# Patient Record
Sex: FEMALE | Race: BLACK OR AFRICAN AMERICAN | NOT HISPANIC OR LATINO | Employment: FULL TIME | URBAN - METROPOLITAN AREA
[De-identification: names, ages, dates, MRNs, and addresses within clinical notes are randomized per-mention and may not be internally consistent; named-entity substitution may affect disease eponyms.]

---

## 2021-03-15 ENCOUNTER — AMBULATORY - HEALTHEAST (OUTPATIENT)
Dept: NURSING | Facility: CLINIC | Age: 31
End: 2021-03-15

## 2021-04-05 ENCOUNTER — AMBULATORY - HEALTHEAST (OUTPATIENT)
Dept: NURSING | Facility: CLINIC | Age: 31
End: 2021-04-05

## 2021-05-13 ENCOUNTER — APPOINTMENT (OUTPATIENT)
Dept: CT IMAGING | Facility: CLINIC | Age: 31
DRG: 394 | End: 2021-05-13
Attending: INTERNAL MEDICINE
Payer: COMMERCIAL

## 2021-05-13 ENCOUNTER — HOSPITAL ENCOUNTER (INPATIENT)
Facility: CLINIC | Age: 31
LOS: 1 days | Discharge: HOME | DRG: 394 | End: 2021-05-14
Attending: EMERGENCY MEDICINE | Admitting: INTERNAL MEDICINE
Payer: COMMERCIAL

## 2021-05-13 DIAGNOSIS — R11.2 NON-INTRACTABLE VOMITING WITH NAUSEA, UNSPECIFIED VOMITING TYPE: ICD-10-CM

## 2021-05-13 DIAGNOSIS — E87.1 HYPONATREMIA: ICD-10-CM

## 2021-05-13 DIAGNOSIS — N17.9 ACUTE KIDNEY INJURY (H): ICD-10-CM

## 2021-05-13 DIAGNOSIS — R10.84 ABDOMINAL PAIN, GENERALIZED: ICD-10-CM

## 2021-05-13 LAB
ALBUMIN SERPL-MCNC: 4.8 G/DL (ref 3.4–5)
ALBUMIN UR-MCNC: 30 MG/DL
ALP SERPL-CCNC: 115 U/L (ref 40–150)
ALT SERPL W P-5'-P-CCNC: 25 U/L (ref 0–50)
AMPHETAMINES UR QL SCN: NEGATIVE
ANION GAP SERPL CALCULATED.3IONS-SCNC: 14 MMOL/L (ref 3–14)
APPEARANCE UR: ABNORMAL
AST SERPL W P-5'-P-CCNC: 21 U/L (ref 0–45)
B-HCG FREE SERPL-ACNC: <5 IU/L
BACTERIA #/AREA URNS HPF: ABNORMAL /HPF
BARBITURATES UR QL: NEGATIVE
BASOPHILS # BLD AUTO: 0 10E9/L (ref 0–0.2)
BASOPHILS NFR BLD AUTO: 0.1 %
BENZODIAZ UR QL: NEGATIVE
BILIRUB SERPL-MCNC: 0.6 MG/DL (ref 0.2–1.3)
BILIRUB UR QL STRIP: NEGATIVE
BUN SERPL-MCNC: 64 MG/DL (ref 7–30)
CALCIUM SERPL-MCNC: 9.5 MG/DL (ref 8.5–10.1)
CANNABINOIDS UR QL SCN: POSITIVE
CHLORIDE SERPL-SCNC: 85 MMOL/L (ref 94–109)
CO2 SERPL-SCNC: 25 MMOL/L (ref 20–32)
COCAINE UR QL: NEGATIVE
COLOR UR AUTO: ABNORMAL
CORTIS SERPL-MCNC: 36.2 UG/DL (ref 4–22)
CREAT SERPL-MCNC: 3.48 MG/DL (ref 0.52–1.04)
DIFFERENTIAL METHOD BLD: ABNORMAL
EOSINOPHIL # BLD AUTO: 0 10E9/L (ref 0–0.7)
EOSINOPHIL NFR BLD AUTO: 0.1 %
ERYTHROCYTE [DISTWIDTH] IN BLOOD BY AUTOMATED COUNT: 11.5 % (ref 10–15)
ETHANOL SERPL-MCNC: <0.01 G/DL
GFR SERPL CREATININE-BSD FRML MDRD: 16 ML/MIN/{1.73_M2}
GLUCOSE BLDC GLUCOMTR-MCNC: 117 MG/DL (ref 70–99)
GLUCOSE BLDC GLUCOMTR-MCNC: 131 MG/DL (ref 70–99)
GLUCOSE SERPL-MCNC: 169 MG/DL (ref 70–99)
GLUCOSE UR STRIP-MCNC: NEGATIVE MG/DL
HBA1C MFR BLD: 6.2 % (ref 0–5.6)
HCT VFR BLD AUTO: 39.3 % (ref 35–47)
HGB BLD-MCNC: 13.7 G/DL (ref 11.7–15.7)
HGB UR QL STRIP: ABNORMAL
HYALINE CASTS #/AREA URNS LPF: 6 /LPF (ref 0–2)
IMM GRANULOCYTES # BLD: 0.1 10E9/L (ref 0–0.4)
IMM GRANULOCYTES NFR BLD: 0.4 %
KETONES UR STRIP-MCNC: NEGATIVE MG/DL
LABORATORY COMMENT REPORT: NORMAL
LEUKOCYTE ESTERASE UR QL STRIP: NEGATIVE
LIPASE SERPL-CCNC: 162 U/L (ref 73–393)
LYMPHOCYTES # BLD AUTO: 2.2 10E9/L (ref 0.8–5.3)
LYMPHOCYTES NFR BLD AUTO: 16.1 %
MAGNESIUM SERPL-MCNC: 3.1 MG/DL (ref 1.6–2.3)
MCH RBC QN AUTO: 28.4 PG (ref 26.5–33)
MCHC RBC AUTO-ENTMCNC: 34.9 G/DL (ref 31.5–36.5)
MCV RBC AUTO: 82 FL (ref 78–100)
MONOCYTES # BLD AUTO: 1.6 10E9/L (ref 0–1.3)
MONOCYTES NFR BLD AUTO: 12 %
MUCOUS THREADS #/AREA URNS LPF: PRESENT /LPF
NEUTROPHILS # BLD AUTO: 9.6 10E9/L (ref 1.6–8.3)
NEUTROPHILS NFR BLD AUTO: 71.3 %
NITRATE UR QL: NEGATIVE
NRBC # BLD AUTO: 0 10*3/UL
NRBC BLD AUTO-RTO: 0 /100
OPIATES UR QL SCN: NEGATIVE
PCP UR QL SCN: NEGATIVE
PH UR STRIP: 5 PH (ref 5–7)
PLATELET # BLD AUTO: 492 10E9/L (ref 150–450)
POTASSIUM SERPL-SCNC: 3.6 MMOL/L (ref 3.4–5.3)
PROT SERPL-MCNC: 9.9 G/DL (ref 6.8–8.8)
RBC # BLD AUTO: 4.82 10E12/L (ref 3.8–5.2)
RBC #/AREA URNS AUTO: <1 /HPF (ref 0–2)
SARS-COV-2 RNA RESP QL NAA+PROBE: NEGATIVE
SODIUM SERPL-SCNC: 124 MMOL/L (ref 133–144)
SOURCE: ABNORMAL
SP GR UR STRIP: 1.02 (ref 1–1.03)
SPECIMEN SOURCE: NORMAL
SQUAMOUS #/AREA URNS AUTO: 6 /HPF (ref 0–1)
TROPONIN I SERPL-MCNC: <0.015 UG/L (ref 0–0.04)
TSH SERPL DL<=0.005 MIU/L-ACNC: 0.93 MU/L (ref 0.4–4)
UROBILINOGEN UR STRIP-MCNC: NORMAL MG/DL (ref 0–2)
WBC # BLD AUTO: 13.4 10E9/L (ref 4–11)
WBC #/AREA URNS AUTO: 1 /HPF (ref 0–5)

## 2021-05-13 PROCEDURE — 96374 THER/PROPH/DIAG INJ IV PUSH: CPT

## 2021-05-13 PROCEDURE — 258N000003 HC RX IP 258 OP 636: Performed by: INTERNAL MEDICINE

## 2021-05-13 PROCEDURE — 84443 ASSAY THYROID STIM HORMONE: CPT | Performed by: EMERGENCY MEDICINE

## 2021-05-13 PROCEDURE — 99223 1ST HOSP IP/OBS HIGH 75: CPT | Mod: AI | Performed by: INTERNAL MEDICINE

## 2021-05-13 PROCEDURE — 250N000013 HC RX MED GY IP 250 OP 250 PS 637: Performed by: INTERNAL MEDICINE

## 2021-05-13 PROCEDURE — 81001 URINALYSIS AUTO W/SCOPE: CPT | Performed by: INTERNAL MEDICINE

## 2021-05-13 PROCEDURE — 83036 HEMOGLOBIN GLYCOSYLATED A1C: CPT | Performed by: EMERGENCY MEDICINE

## 2021-05-13 PROCEDURE — 93005 ELECTROCARDIOGRAM TRACING: CPT

## 2021-05-13 PROCEDURE — 84702 CHORIONIC GONADOTROPIN TEST: CPT

## 2021-05-13 PROCEDURE — 258N000003 HC RX IP 258 OP 636: Performed by: EMERGENCY MEDICINE

## 2021-05-13 PROCEDURE — 83690 ASSAY OF LIPASE: CPT | Performed by: EMERGENCY MEDICINE

## 2021-05-13 PROCEDURE — 120N000001 HC R&B MED SURG/OB

## 2021-05-13 PROCEDURE — 999N001017 HC STATISTIC GLUCOSE BY METER IP

## 2021-05-13 PROCEDURE — 82077 ASSAY SPEC XCP UR&BREATH IA: CPT | Performed by: EMERGENCY MEDICINE

## 2021-05-13 PROCEDURE — 96361 HYDRATE IV INFUSION ADD-ON: CPT

## 2021-05-13 PROCEDURE — 87635 SARS-COV-2 COVID-19 AMP PRB: CPT | Performed by: EMERGENCY MEDICINE

## 2021-05-13 PROCEDURE — C9803 HOPD COVID-19 SPEC COLLECT: HCPCS

## 2021-05-13 PROCEDURE — 82533 TOTAL CORTISOL: CPT | Performed by: EMERGENCY MEDICINE

## 2021-05-13 PROCEDURE — 83735 ASSAY OF MAGNESIUM: CPT | Performed by: EMERGENCY MEDICINE

## 2021-05-13 PROCEDURE — 74176 CT ABD & PELVIS W/O CONTRAST: CPT

## 2021-05-13 PROCEDURE — 80053 COMPREHEN METABOLIC PANEL: CPT | Performed by: EMERGENCY MEDICINE

## 2021-05-13 PROCEDURE — 85025 COMPLETE CBC W/AUTO DIFF WBC: CPT | Performed by: EMERGENCY MEDICINE

## 2021-05-13 PROCEDURE — 84484 ASSAY OF TROPONIN QUANT: CPT | Performed by: EMERGENCY MEDICINE

## 2021-05-13 PROCEDURE — 96375 TX/PRO/DX INJ NEW DRUG ADDON: CPT

## 2021-05-13 PROCEDURE — 99285 EMERGENCY DEPT VISIT HI MDM: CPT

## 2021-05-13 PROCEDURE — 80307 DRUG TEST PRSMV CHEM ANLYZR: CPT | Performed by: INTERNAL MEDICINE

## 2021-05-13 PROCEDURE — 250N000011 HC RX IP 250 OP 636: Performed by: EMERGENCY MEDICINE

## 2021-05-13 RX ORDER — DEXTROSE MONOHYDRATE 25 G/50ML
25-50 INJECTION, SOLUTION INTRAVENOUS
Status: DISCONTINUED | OUTPATIENT
Start: 2021-05-13 | End: 2021-05-14 | Stop reason: HOSPADM

## 2021-05-13 RX ORDER — NORTRIPTYLINE HCL 10 MG
50 CAPSULE ORAL AT BEDTIME
COMMUNITY

## 2021-05-13 RX ORDER — NITROGLYCERIN 0.4 MG/1
0.4 TABLET SUBLINGUAL EVERY 5 MIN PRN
Status: DISCONTINUED | OUTPATIENT
Start: 2021-05-13 | End: 2021-05-14 | Stop reason: HOSPADM

## 2021-05-13 RX ORDER — SODIUM CHLORIDE 9 MG/ML
INJECTION, SOLUTION INTRAVENOUS CONTINUOUS
Status: DISCONTINUED | OUTPATIENT
Start: 2021-05-13 | End: 2021-05-14 | Stop reason: HOSPADM

## 2021-05-13 RX ORDER — LORAZEPAM 2 MG/ML
0.5 INJECTION INTRAMUSCULAR EVERY 4 HOURS PRN
Status: DISCONTINUED | OUTPATIENT
Start: 2021-05-13 | End: 2021-05-14 | Stop reason: HOSPADM

## 2021-05-13 RX ORDER — NICOTINE POLACRILEX 4 MG
15-30 LOZENGE BUCCAL
Status: DISCONTINUED | OUTPATIENT
Start: 2021-05-13 | End: 2021-05-14 | Stop reason: HOSPADM

## 2021-05-13 RX ORDER — LIDOCAINE 40 MG/G
CREAM TOPICAL
Status: DISCONTINUED | OUTPATIENT
Start: 2021-05-13 | End: 2021-05-14 | Stop reason: HOSPADM

## 2021-05-13 RX ORDER — ONDANSETRON 2 MG/ML
4 INJECTION INTRAMUSCULAR; INTRAVENOUS EVERY 6 HOURS PRN
Status: DISCONTINUED | OUTPATIENT
Start: 2021-05-13 | End: 2021-05-14 | Stop reason: HOSPADM

## 2021-05-13 RX ORDER — ACETAMINOPHEN 650 MG/1
650 SUPPOSITORY RECTAL EVERY 4 HOURS PRN
Status: DISCONTINUED | OUTPATIENT
Start: 2021-05-13 | End: 2021-05-14 | Stop reason: HOSPADM

## 2021-05-13 RX ORDER — BISACODYL 10 MG
10 SUPPOSITORY, RECTAL RECTAL DAILY PRN
Status: DISCONTINUED | OUTPATIENT
Start: 2021-05-13 | End: 2021-05-14 | Stop reason: HOSPADM

## 2021-05-13 RX ORDER — KETOROLAC TROMETHAMINE 15 MG/ML
15 INJECTION, SOLUTION INTRAMUSCULAR; INTRAVENOUS ONCE
Status: COMPLETED | OUTPATIENT
Start: 2021-05-13 | End: 2021-05-13

## 2021-05-13 RX ORDER — INSULIN GLARGINE 100 [IU]/ML
15 INJECTION, SOLUTION SUBCUTANEOUS EVERY MORNING
COMMUNITY
End: 2021-06-11

## 2021-05-13 RX ORDER — ONDANSETRON 4 MG/1
4 TABLET, ORALLY DISINTEGRATING ORAL EVERY 6 HOURS PRN
Status: DISCONTINUED | OUTPATIENT
Start: 2021-05-13 | End: 2021-05-14 | Stop reason: HOSPADM

## 2021-05-13 RX ORDER — PROCHLORPERAZINE MALEATE 5 MG
10 TABLET ORAL EVERY 6 HOURS PRN
Status: DISCONTINUED | OUTPATIENT
Start: 2021-05-13 | End: 2021-05-14 | Stop reason: HOSPADM

## 2021-05-13 RX ORDER — DIPHENHYDRAMINE HYDROCHLORIDE 50 MG/ML
25 INJECTION INTRAMUSCULAR; INTRAVENOUS ONCE
Status: COMPLETED | OUTPATIENT
Start: 2021-05-13 | End: 2021-05-13

## 2021-05-13 RX ORDER — HALOPERIDOL 5 MG/ML
5 INJECTION INTRAMUSCULAR ONCE
Status: COMPLETED | OUTPATIENT
Start: 2021-05-13 | End: 2021-05-13

## 2021-05-13 RX ORDER — ACETAMINOPHEN 325 MG/1
650 TABLET ORAL EVERY 4 HOURS PRN
Status: DISCONTINUED | OUTPATIENT
Start: 2021-05-13 | End: 2021-05-14 | Stop reason: HOSPADM

## 2021-05-13 RX ORDER — MULTIPLE VITAMINS W/ MINERALS TAB 9MG-400MCG
1 TAB ORAL DAILY
COMMUNITY
End: 2021-06-11

## 2021-05-13 RX ORDER — PROCHLORPERAZINE 25 MG
25 SUPPOSITORY, RECTAL RECTAL EVERY 12 HOURS PRN
Status: DISCONTINUED | OUTPATIENT
Start: 2021-05-13 | End: 2021-05-14 | Stop reason: HOSPADM

## 2021-05-13 RX ORDER — HYDRALAZINE HYDROCHLORIDE 20 MG/ML
10 INJECTION INTRAMUSCULAR; INTRAVENOUS EVERY 4 HOURS PRN
Status: DISCONTINUED | OUTPATIENT
Start: 2021-05-13 | End: 2021-05-14 | Stop reason: HOSPADM

## 2021-05-13 RX ORDER — PANTOPRAZOLE SODIUM 40 MG/1
40 TABLET, DELAYED RELEASE ORAL
Status: DISCONTINUED | OUTPATIENT
Start: 2021-05-14 | End: 2021-05-14 | Stop reason: HOSPADM

## 2021-05-13 RX ORDER — ONDANSETRON 2 MG/ML
8 INJECTION INTRAMUSCULAR; INTRAVENOUS ONCE
Status: COMPLETED | OUTPATIENT
Start: 2021-05-13 | End: 2021-05-13

## 2021-05-13 RX ORDER — VENLAFAXINE HYDROCHLORIDE 150 MG/1
150 CAPSULE, EXTENDED RELEASE ORAL DAILY
COMMUNITY
End: 2021-06-11

## 2021-05-13 RX ORDER — AMOXICILLIN 250 MG
1 CAPSULE ORAL 2 TIMES DAILY PRN
Status: DISCONTINUED | OUTPATIENT
Start: 2021-05-13 | End: 2021-05-14 | Stop reason: HOSPADM

## 2021-05-13 RX ADMIN — SODIUM CHLORIDE, POTASSIUM CHLORIDE, SODIUM LACTATE AND CALCIUM CHLORIDE 1000 ML: 600; 310; 30; 20 INJECTION, SOLUTION INTRAVENOUS at 15:27

## 2021-05-13 RX ADMIN — SODIUM CHLORIDE, POTASSIUM CHLORIDE, SODIUM LACTATE AND CALCIUM CHLORIDE 1000 ML: 600; 310; 30; 20 INJECTION, SOLUTION INTRAVENOUS at 17:26

## 2021-05-13 RX ADMIN — DIPHENHYDRAMINE HYDROCHLORIDE 25 MG: 50 INJECTION, SOLUTION INTRAMUSCULAR; INTRAVENOUS at 17:35

## 2021-05-13 RX ADMIN — KETOROLAC TROMETHAMINE 15 MG: 15 INJECTION, SOLUTION INTRAMUSCULAR; INTRAVENOUS at 15:26

## 2021-05-13 RX ADMIN — HALOPERIDOL LACTATE 5 MG: 5 INJECTION, SOLUTION INTRAMUSCULAR at 17:32

## 2021-05-13 RX ADMIN — SODIUM CHLORIDE: 9 INJECTION, SOLUTION INTRAVENOUS at 19:52

## 2021-05-13 RX ADMIN — BISACODYL 10 MG: 10 SUPPOSITORY RECTAL at 21:29

## 2021-05-13 RX ADMIN — ONDANSETRON 8 MG: 2 INJECTION INTRAMUSCULAR; INTRAVENOUS at 15:27

## 2021-05-13 ASSESSMENT — ACTIVITIES OF DAILY LIVING (ADL)
FALL_HISTORY_WITHIN_LAST_SIX_MONTHS: NO
DOING_ERRANDS_INDEPENDENTLY_DIFFICULTY: NO
DRESSING/BATHING_DIFFICULTY: NO
WEAR_GLASSES_OR_BLIND: YES
DIFFICULTY_COMMUNICATING: NO
HEARING_DIFFICULTY_OR_DEAF: NO
TOILETING_ISSUES: NO
ADLS_ACUITY_SCORE: 19
CONCENTRATING,_REMEMBERING_OR_MAKING_DECISIONS_DIFFICULTY: OTHER (SEE COMMENTS)
WALKING_OR_CLIMBING_STAIRS_DIFFICULTY: NO
VISION_MANAGEMENT: WEARS GLASSES
DIFFICULTY_EATING/SWALLOWING: NO

## 2021-05-13 ASSESSMENT — ENCOUNTER SYMPTOMS
DIARRHEA: 0
FEVER: 0
DYSURIA: 0
HEMATURIA: 0

## 2021-05-13 ASSESSMENT — MIFFLIN-ST. JEOR: SCORE: 1400.35

## 2021-05-13 NOTE — ED TRIAGE NOTES
Pt here for L sided flank and abd pain that started a couple of days ago. States that this is chronic, but flaring right now. No diarrhea, reports nausea and vomiting. No issues with urination. A+OX4, no acute signs of distress

## 2021-05-13 NOTE — ED NOTES
[facility]  ED Nurse Handoff Report    Kym Lemus is a 30 year old female   ED Chief complaint: Flank Pain  . ED Diagnosis:   Final diagnoses:   Acute kidney injury (H)   Non-intractable vomiting with nausea, unspecified vomiting type   Abdominal pain, generalized   Hyponatremia     Allergies:   Allergies   Allergen Reactions     Metformin Diarrhea       Code Status: Full Code  Activity level - Baseline/Home:  Independent. Activity Level - Current:   Stand by Assist. Lift room needed: No. Bariatric: No   Needed: No   Isolation: Yes. Infection: Not Applicable.     Vital Signs:   Vitals:    05/13/21 1530 05/13/21 1545 05/13/21 1600 05/13/21 1615   BP: (!) 175/116  (!) 165/102    Pulse: 99 109 111 106   Resp: 24 18 20 19   Temp:       TempSrc:       SpO2: 99% 100% 100% 100%       Cardiac Rhythm:  ,      Pain level:    Patient confused: No. Patient Falls Risk: Yes.   Elimination Status: Unable to void and Bladder was scanned at 1700 PM; bladder scan volume was: 170 mL   Patient Report - Initial Complaint: Flank pain. Focused Assessment: Gastrointestinal - Gastrointestinal WDL: GI symptoms; nausea and vomiting  Nausea/Vomiting Signs/Symptoms: nausea continuous; emesis  GI Signs/Symptoms: abdominal discomfort; abdominal fullness    Tests Performed: Labs. Abnormal Results:   Labs Ordered and Resulted from Time of ED Arrival Up to the Time of Departure from the ED   CBC WITH PLATELETS DIFFERENTIAL - Abnormal; Notable for the following components:       Result Value    WBC 13.4 (*)     Platelet Count 492 (*)     Absolute Neutrophil 9.6 (*)     Absolute Monocytes 1.6 (*)     All other components within normal limits   COMPREHENSIVE METABOLIC PANEL - Abnormal; Notable for the following components:    Sodium 124 (*)     Chloride 85 (*)     Glucose 169 (*)     Urea Nitrogen 64 (*)     Creatinine 3.48 (*)     GFR Estimate 16 (*)     GFR Estimate If Black 19 (*)     Protein Total 9.9 (*)     All other  components within normal limits   LIPASE   ROUTINE UA WITH MICROSCOPIC   SARS-COV-2 (COVID-19) VIRUS RT-PCR   CORTISOL   PERIPHERAL IV CATHETER   ISTAT HCG QUANTITATIVE PREGNANCY NURSING POCT   BLADDER SCAN   ISTAT HCG QUANTITATIVE PREGNANCY POCT     No orders to display      Treatments provided: IV meds, IVF  Family Comments: Family at bedside  OBS brochure/video discussed/provided to patient:  Yes  ED Medications:   Medications   lactated ringers BOLUS 1,000 mL (1,000 mLs Intravenous New Bag 5/13/21 1726)   lactated ringers BOLUS 1,000 mL (0 mLs Intravenous Stopped 5/13/21 1726)   ketorolac (TORADOL) injection 15 mg (15 mg Intravenous Given 5/13/21 1526)   ondansetron (ZOFRAN) injection 8 mg (8 mg Intravenous Given 5/13/21 1527)   haloperidol lactate (HALDOL) injection 5 mg (5 mg Intravenous Given 5/13/21 1732)   diphenhydrAMINE (BENADRYL) injection 25 mg (25 mg Intravenous Given 5/13/21 1735)     Drips infusing:  Yes  For the majority of the shift, the patient's behavior Green. Interventions performed were N/A.    Sepsis treatment initiated: No     Patient tested for COVID 19 prior to admission: YES    ED Nurse Name/Phone Number: Olga Zabala RN,   5:36 PM  RECEIVING UNIT ED HANDOFF REVIEW    Above ED Nurse Handoff Report was reviewed: yes  Reviewed by: Ruth Beard on May 13, 2021 at 5:58 PM

## 2021-05-13 NOTE — ED NOTES
Pt refused haldol and benadryl, states she is feeling improved after toradol and zofran. Still unable to provide urine sample.

## 2021-05-13 NOTE — H&P
[facility]    History and Physical - Hospitalist Service       Date of Admission:  5/13/2021    Assessment & Plan   Kym Lemus is a 30 year old female admitted on 5/13/2021. She has a past medical history significant for diabetes mellitus and cyclic vomiting syndrome.  She presented to emergency room with nausea, vomiting, abdominal pain.    1.  Nausea, vomiting, abdominal pain.  Suspect related to exacerbation of cyclic vomiting syndrome.  I suspect that she does have cannabinoid hyperemesis syndrome as she does continue to use edible marijuana.  -Needs long-term marijuana cessation.  -Start continuous IV fluids.  -Antiemetics as needed.  -Check abdominal CT without contrast due to acute kidney failure.  -Start pantoprazole 40 mg twice a day.    2.  Acute kidney injury on chronic kidney disease.  Creatinine currently elevated at 3.5.  Suspect prerenal due to dehydration from frequent vomiting and poor oral intake.  -Start continuous IV fluids.  -Avoid nephrotoxins as able.  -Recheck metabolic panel in the morning.  If creatinine not significantly improved, needs further evaluation.    3.  Hyponatremia.  Mild.  Suspect due to dehydration.  -Start continuous IV fluids.  -Recheck sodium tomorrow morning.    4.  Diabetes mellitus type 2.  Unclear how much intake she will be able to manage with her above symptoms.  -Start NovoLog sliding scale.    5.  Sinus tachycardia.  Suspect due to dehydration.  -Monitor on telemetry.  -Check TSH.  -Continuous IV fluids.    6.  Leukocytosis.  Check urine analysis, abdominal CT scan.     Diet: Room Service  Combination Diet Regular Diet Adult    DVT Prophylaxis: Pneumatic Compression Devices  Gallagher Catheter: not present  Code Status: Full Code           Disposition Plan   Expected discharge: Tomorrow, recommended to prior living arrangement     Jaden Cuevas DO  [facility]  Contact information available via [facility]  Paging/Directory      ______________________________________________________________________    Chief Complaint   Nausea, vomiting, abdominal pain.    History is obtained from the patient    History of Present Illness   Kym Lemus is a 30 year old female who has a past medical history significant for diabetes mellitus, hypertension, and cyclic vomiting syndrome.  She developed worsening nausea four days ago.  Has been vomiting approximately 12 times a day for the past couple of days.  Having some diffuse achy abdominal pain.  She does notice that the pain seems to be the worst in the left lower quadrant of her abdomen.  She has had similar symptoms in the past.  She has not had any fevers or chills.  No diarrhea.  No chest pain, shortness of breath, or cough.  She cannot remember having any change in her urination.  Does not think that she is making less urine than usual.  Has not been drinking well because of above symptoms.  Has not been eating much at all for the past few days.  Nothing at home was helping symptoms.  She is feeling a bit better after medications given in the ER.  Also feeling sleepy after medications given in the ER.    Review of Systems    The 10 point Review of Systems is negative other than noted in the HPI     Past Medical History    I have reviewed this patient's medical history and updated it with pertinent information if needed.   Diabetes mellitus type 2.  Cyclic vomiting.    Past Surgical History   I have reviewed this patient's surgical history and updated it with pertinent information if needed.  No past surgical history on file.    Social History   I have reviewed this patient's social history and updated it with pertinent information if needed.  Denies tobacco use.  Denies alcohol use.  Does ingest edible marijuana.  Denies smoking marijuana.  Denies any other drug use.    Family History     Mother with diabetes.  No known coronary disease or cancer in the immediate  family.    Prior to Admission Medications   None     Allergies   Allergies   Allergen Reactions     Metformin Diarrhea       Physical Exam   Vital Signs: Temp: 98.8  F (37.1  C) Temp src: Oral BP: (!) 151/91 Pulse: 113   Resp: 20 SpO2: 99 % O2 Device: None (Room air)    Weight: 152 lbs 0 oz    Gen:  NAD, A&Ox3.  She does fall asleep several times during our discussion.  Does awaken easily.  Eyes:  PERRL, sclera anicteric.  OP:  MMM, no lesions.  Neck:  Supple.  CV:  Regular, mildly tachycardic, no murmurs.  Lung:  CTA b/l, normal effort.  Ab:  +BS, soft.  Skin:  Warm, dry to touch.  No rash.  Ext:  No pitting edema LE b/l.      Data   Data reviewed today: I reviewed all medications, new labs and imaging results over the last 24 hours. I personally reviewed the EKG tracing showing Sinus tachycardia, inverted T waves in multiple leads.    Recent Labs   Lab 05/13/21  1451   WBC 13.4*   HGB 13.7   MCV 82   *   *   POTASSIUM 3.6   CHLORIDE 85*   CO2 25   BUN 64*   CR 3.48*   ANIONGAP 14   DIMITRI 9.5   *   ALBUMIN 4.8   PROTTOTAL 9.9*   BILITOTAL 0.6   ALKPHOS 115   ALT 25   AST 21   LIPASE 162

## 2021-05-13 NOTE — ED PROVIDER NOTES
History   Chief Complaint:  Flank Pain       HPI   Kym Lemus is a 30 year old female with history of chronic abdominal pain who presents with chronic pain and nausea. Kym reports that, beginning four days ago, she has begun feeling similar pain as she has in the past, which she describes as pressure in her left upper abdomen, but this time the pain has been more severe and accompanied by nausea and vomiting. Kym reports that she has vomited around 12 times in the last 24 hours. She notes that her mother  last year and thinks that Mother's Day was the day she began feeling symptoms, so that may have been a stressor that provoked her pain. She also reports that she uses marijuana regularly, but denies doing so before her chronic pain began in 2019. Kym also denies having fever, diarrhea, dysuria, hematuria, or vaginal discharge, as well as any history of abdominal surgeries.      Review of Systems   Constitutional: Negative for fever.   Gastrointestinal: Negative for diarrhea.   Genitourinary: Negative for dysuria and hematuria.   All other systems reviewed and are negative.      Allergies:  Metformin    Medications:  Juleber  Insulin  Nortriptyline  Venlafaxine      Past Medical History:    Chronic abdominal pain  Acute kidney injury  Acute hyponatremia  Gastroparesis  Marijuana use  Tachycardia  Hypertension  Hypokalemia  Gastritis  Intractable vomiting with nausea  Diabetes mellitus, type 2  Obesity   Diabetic acidosis  Cervical intraepithelial neoplasia    Past Surgical History:    No surgical history.     Family History:    Cataract  Diabetes  Glaucoma  Hypertension    Social History:  Uses marijuana regularly.  Presents alone.    Physical Exam     Patient Vitals for the past 24 hrs:   BP Temp Temp src Pulse Resp SpO2   21 1615 -- -- -- 106 19 100 %   21 1600 (!) 165/102 -- -- 111 20 100 %   21 1545 -- -- -- 109 18 100 %   21 1530 (!) 175/116 -- -- 99 24 99 %    05/13/21 1515 -- -- -- -- -- 100 %   05/13/21 1441 (!) 171/152 97.9  F (36.6  C) Temporal 130 16 99 %       Physical Exam    Eyes:    Conjunctiva normal  Neck:    Supple, no meningismus.     CV:     Tachycardic, regular rhythm.      No murmurs, rubs or gallops.     No lower extremity edema.  PULM:    Clear to auscultation bilateral.       No respiratory distress.      Good air exchange.     No rales or wheezing.  ABD:    Soft, non-distended.       Minimal tenderness in the LUQ.     Bowel sounds normal.     No pulsatile masses.       No rebound, guarding or rigidity.     No CVA tenderness.      No hepatosplenomegaly.  MSK:     No gross deformity to all four extremities.   LYMPH:   No cervical lymphadenopathy.  NEURO:   Alert.  Good muscular tone, no atrophy.   Skin:    Warm, dry and intact.    Psych:    Mood is good and affect is appropriate.    Emergency Department Course   ECG  ECG taken at 1524   No prior ECG for comparison.  Rate 109 bpm. WV interval 144 ms. QRS duration 80 ms. QT/QTc 334/449 ms. P-R-T axes 73 79 128.     Laboratory:  CBC: WBC: 13.4 (H), HGB: 13.7, PLT: 492 (H)  CMP: Glucose 169 (H), Sodium: 124 (L), Chloride: 85 (L), GFR: 19 (L), Creatinine: 3.48 (H) o/w WNL  Lipase: 162  ISTAT HCG quantitative pregnancy POCT: <5.0  Asymptomatic COVID19 Virus PCR by nasopharyngeal swab: Negative      Emergency Department Course:    Reviewed:  I reviewed nursing notes, vitals, past medical history and care everywhere    Assessments:  1500 I obtained history and examined the patient as noted above.   1709 I rechecked the patient and explained findings. Patient comfortable with admission.    Consults:   1717 I spoke with Dr. Cuevas, the hospitalist, who agreed to admit the patient.    Interventions:  1526 Toradol 15 mg IV  1527 Lactated ringers 1000 ml IV  1527 Zofran 8 mg  1527 Lactated ringers 1000 ml IV  1726 Lactated ringers 1000 ml IV  1732 Haldol 5 mg IV  1735 Benadryl 25 mg IV      Disposition:  The  patient was admitted to the hospital under the care of Dr. Cuevas.       Impression & Plan     Medical Decision Makin-year-old female with a history of chronic abdominal pain and likely cyclic vomiting syndrome presents with intractable nausea and vomiting over the last 4 to 5 days with recurrence of her typical left upper quadrant pain in the face of chronic marijuana use.  Abdominal examination is benign.  Patient noted to have hyponatremia and acute kidney injury with a creatinine of 3.5 and baseline of 1.5.  DEREK appears secondary to intravascular volume depletion.  Low suspicion for post renal obstruction.  Patient given IV fluids for rehydration and electrolyte correction.  There may be some degree of cannabis hyperemesis syndrome contributing to her symptoms today.  Patient will be transferred to a medical bed for ongoing management.      Covid-19  Kym Early was evaluated during a global COVID-19 pandemic, which necessitated consideration that the patient might be at risk for infection with the SARS-CoV-2 virus that causes COVID-19.   Applicable protocols for evaluation were followed during the patient's care.   COVID-19 was considered as part of the patient's evaluation. The plan for testing is:  a test was obtained during this visit.    Diagnosis:    ICD-10-CM    1. Acute kidney injury (H)  N17.9    2. Non-intractable vomiting with nausea, unspecified vomiting type  R11.2    3. Abdominal pain, generalized  R10.84    4. Hyponatremia  E87.1        Scribe Disclosure:  I, Jelani Finney, am serving as a scribe at 2:57 PM on 2021 to document services personally performed by Antonio Morales MD based on my observations and the provider's statements to me.          Antonio Morales MD  [phone]

## 2021-05-14 VITALS
HEIGHT: 65 IN | SYSTOLIC BLOOD PRESSURE: 120 MMHG | HEART RATE: 91 BPM | OXYGEN SATURATION: 98 % | RESPIRATION RATE: 16 BRPM | BODY MASS INDEX: 25.33 KG/M2 | TEMPERATURE: 98.5 F | WEIGHT: 152 LBS | DIASTOLIC BLOOD PRESSURE: 75 MMHG

## 2021-05-14 LAB
ANION GAP SERPL CALCULATED.3IONS-SCNC: 3 MMOL/L (ref 3–14)
BUN SERPL-MCNC: 57 MG/DL (ref 7–30)
CALCIUM SERPL-MCNC: 8.4 MG/DL (ref 8.5–10.1)
CHLORIDE SERPL-SCNC: 98 MMOL/L (ref 94–109)
CO2 SERPL-SCNC: 32 MMOL/L (ref 20–32)
CREAT SERPL-MCNC: 2.35 MG/DL (ref 0.52–1.04)
ERYTHROCYTE [DISTWIDTH] IN BLOOD BY AUTOMATED COUNT: 11.4 % (ref 10–15)
GFR SERPL CREATININE-BSD FRML MDRD: 26 ML/MIN/{1.73_M2}
GLUCOSE BLDC GLUCOMTR-MCNC: 127 MG/DL (ref 70–99)
GLUCOSE BLDC GLUCOMTR-MCNC: 131 MG/DL (ref 70–99)
GLUCOSE BLDC GLUCOMTR-MCNC: 170 MG/DL (ref 70–99)
GLUCOSE SERPL-MCNC: 127 MG/DL (ref 70–99)
HCT VFR BLD AUTO: 31.1 % (ref 35–47)
HGB BLD-MCNC: 10.6 G/DL (ref 11.7–15.7)
INTERPRETATION ECG - MUSE: NORMAL
MCH RBC QN AUTO: 28.9 PG (ref 26.5–33)
MCHC RBC AUTO-ENTMCNC: 34.1 G/DL (ref 31.5–36.5)
MCV RBC AUTO: 85 FL (ref 78–100)
PLATELET # BLD AUTO: 358 10E9/L (ref 150–450)
POTASSIUM SERPL-SCNC: 3.3 MMOL/L (ref 3.4–5.3)
POTASSIUM SERPL-SCNC: 3.6 MMOL/L (ref 3.4–5.3)
RBC # BLD AUTO: 3.67 10E12/L (ref 3.8–5.2)
SODIUM SERPL-SCNC: 133 MMOL/L (ref 133–144)
WBC # BLD AUTO: 7.4 10E9/L (ref 4–11)

## 2021-05-14 PROCEDURE — 85027 COMPLETE CBC AUTOMATED: CPT | Performed by: INTERNAL MEDICINE

## 2021-05-14 PROCEDURE — 36415 COLL VENOUS BLD VENIPUNCTURE: CPT | Performed by: INTERNAL MEDICINE

## 2021-05-14 PROCEDURE — 250N000013 HC RX MED GY IP 250 OP 250 PS 637: Performed by: HOSPITALIST

## 2021-05-14 PROCEDURE — 250N000013 HC RX MED GY IP 250 OP 250 PS 637: Performed by: INTERNAL MEDICINE

## 2021-05-14 PROCEDURE — 80048 BASIC METABOLIC PNL TOTAL CA: CPT | Performed by: INTERNAL MEDICINE

## 2021-05-14 PROCEDURE — 258N000003 HC RX IP 258 OP 636: Performed by: INTERNAL MEDICINE

## 2021-05-14 PROCEDURE — 999N001017 HC STATISTIC GLUCOSE BY METER IP

## 2021-05-14 PROCEDURE — 36415 COLL VENOUS BLD VENIPUNCTURE: CPT | Performed by: HOSPITALIST

## 2021-05-14 PROCEDURE — 99239 HOSP IP/OBS DSCHRG MGMT >30: CPT | Performed by: HOSPITALIST

## 2021-05-14 PROCEDURE — 84132 ASSAY OF SERUM POTASSIUM: CPT | Performed by: HOSPITALIST

## 2021-05-14 RX ORDER — POTASSIUM CHLORIDE 1500 MG/1
20 TABLET, EXTENDED RELEASE ORAL ONCE
Status: COMPLETED | OUTPATIENT
Start: 2021-05-14 | End: 2021-05-14

## 2021-05-14 RX ADMIN — SODIUM CHLORIDE: 9 INJECTION, SOLUTION INTRAVENOUS at 03:41

## 2021-05-14 RX ADMIN — POTASSIUM CHLORIDE 20 MEQ: 1500 TABLET, EXTENDED RELEASE ORAL at 12:18

## 2021-05-14 RX ADMIN — PANTOPRAZOLE SODIUM 40 MG: 40 TABLET, DELAYED RELEASE ORAL at 09:19

## 2021-05-14 ASSESSMENT — ACTIVITIES OF DAILY LIVING (ADL)
ADLS_ACUITY_SCORE: 19

## 2021-05-14 NOTE — PHARMACY-ADMISSION MEDICATION HISTORY
Admission medication history interview status for this patient is complete. See [facility] admission navigator for allergy information, prior to admission medications and immunization status.     Medication history interview done, indicate source(s): Patient  Medication history resources (including written lists, pill bottles, clinic record):None  [place] [address]    Changes made to PTA medication list:  Added: all  Deleted: none  Changed: none    Actions taken by pharmacist (provider contacted, etc):None     Additional medication history information: Kym noted she is scheduled to increase to 3 caps of nortriptyline on Saturday.    Medication reconciliation/reorder completed by provider prior to medication history?  N    Prior to Admission medications    Medication Sig Last Dose Taking? Auth Provider   insulin glargine (LANTUS VIAL) 100 UNIT/ML vial Inject 15 Units Subcutaneous every morning 5/9/2021 Yes Reported, Patient   nortriptyline (PAMELOR) 10 MG capsule Take 10 mg by mouth See Admin Instructions Titrate up to 4 capsules once daily in the evening. Start with 1 cap daily for 7 days, then increase to 2 caps daily for 7 days, then 3 caps daily for 7 days, then 4 caps daily thereafter. 5/12/2021 at PM (20 mg) Yes Reported, Patient   venlafaxine (EFFEXOR-XR) 150 MG 24 hr capsule Take 150 mg by mouth daily 5/12/2021 Yes Reported, Patient   multivitamin w/minerals (THERA-VIT-M) tablet Take 1 tablet by mouth daily Unknown  Reported, Patient

## 2021-05-14 NOTE — DISCHARGE SUMMARY
[facility]    Discharge Summary  Hospitalist    Date of Admission:  5/13/2021  Date of Discharge:  5/14/2021  Provider:  Isak Bautista MD  Date of Service (when I last saw the patient): 05/14/21    Discharge Diagnoses   1. Cyclic vomiting syndrome.  Presumptive cannabinoid hyperemesis syndrome as she does continue to use edible marijuana though etiology is unclear. .  2.  Acute kidney injury on chronic kidney disease. Prerenal due to dehydration from frequent vomiting and poor oral intake.  3.  Mild hyponatremia due to dehydration, correction per protocol.  4.  Diabetes mellitus type 2.   5.  Sinus tachycardia.  Compensatory due to dehydration.  6.  Leukocytosis.  Reactive due to vomiting and dehydration..      Other medical issues:  Type 2 diabetes mellitus, cyclic vomiting.      History of Present Illness   Kym Lemus is an 30 year old female who presented with intractable.  Please see the admission history and physical for full details.    Hospital Course   Kym Lemus is a 30 year old female admitted on 5/13/2021. She has a past medical history significant for diabetes mellitus and cyclic vomiting syndrome.  She presented to emergency room with nausea, vomiting, abdominal pain.     1.  Nausea, vomiting, abdominal pain.  Suspect related to exacerbation of cyclic vomiting syndrome.  I suspect that she does have cannabinoid hyperemesis syndrome as she does continue to use edible marijuana.  -Needs long-term marijuana cessation.  -Start continuous IV fluids.  -Antiemetics as needed.  -Check abdominal CT without contrast due to acute kidney failure.  -Start pantoprazole 40 mg twice a day.     2.  Acute kidney injury on chronic kidney disease.  Creatinine currently elevated at 3.5.  Suspect prerenal due to dehydration from frequent vomiting and poor oral intake.  -Start continuous IV fluids.  -Avoid nephrotoxins as able.  -Recheck metabolic panel in the morning.  If creatinine not  significantly improved, needs further evaluation.     3.  Hyponatremia.  Mild.  Suspect due to dehydration.  - IV fluids.  -Resolved    4.  Diabetes mellitus type 2.  Unclear how much intake she will be able to manage with her above symptoms.  -NovoLog sliding scale.     5.  Sinus tachycardia.  Suspect due to dehydration.  -Monitor on telemetry  - IV fluids.     6.  Leukocytosis.  Check urine analysis, abdominal CT scan.    # Discharge Pain Plan:    - Patient currently has NO PAIN and is not being prescribed pain medications on discharge.      Significant Results and Procedures   See below     Pending Results      Unresulted Labs Ordered in the Past 30 Days of this Admission     No orders found for last 31 day(s).          Code Status   Full Code       Primary Care Physician   [facility]    GEN:  Alert, oriented x 3, appears comfortable, NAD.  HEENT:  Normocephalic/atraumatic, no scleral icterus, no nasal discharge, mouth moist.  CV:  Regular rate and rhythm, no murmur or JVD.  S1 + S2 noted, no S3 or S4.  LUNGS:  Clear to auscultation bilaterally without rales/rhonchi/wheezing/retractions.  Symmetric chest rise on inhalation noted.  ABD:  Active bowel sounds, soft, non-tender/non-distended.  No rebound/guarding/rigidity.  EXT:  No edema or cyanosis.  No joint synovitis noted.  SKIN:  Dry to touch, no exanthems noted in the visualized areas.     Discharge Disposition   Discharged to home    Consultations This Hospital Stay   None    Time Spent on this Encounter   I, Isak Bautista MD, personally saw the patient today and spent greater than 30 minutes discharging this patient.     Discharge Orders      Reason for your hospital stay    Cyclic vomiting, unclear etiology     Follow-up and recommended labs and tests     Follow up with primary care provider, [facility], within 7 days for hospital follow- up.  No follow up labs or test are needed.     Activity     Your activity upon discharge: activity as tolerated     Full Code     Diet    Follow this diet upon discharge: Orders Placed This Encounter      Room Service      Combination Diet Regular Diet Adult     Discharge Medications   Current Discharge Medication List      CONTINUE these medications which have NOT CHANGED    Details   insulin glargine (LANTUS VIAL) 100 UNIT/ML vial Inject 15 Units Subcutaneous every morning      nortriptyline (PAMELOR) 10 MG capsule Take 10 mg by mouth See Admin Instructions Titrate up to 4 capsules once daily in the evening. Start with 1 cap daily for 7 days, then increase to 2 caps daily for 7 days, then 3 caps daily for 7 days, then 4 caps daily thereafter.      venlafaxine (EFFEXOR-XR) 150 MG 24 hr capsule Take 150 mg by mouth daily      multivitamin w/minerals (THERA-VIT-M) tablet Take 1 tablet by mouth daily           Allergies   Allergies   Allergen Reactions     Metformin Diarrhea     Data   Most Recent 3 CBC's:  Recent Labs   Lab Test 05/14/21  0653 05/13/21  1451   WBC 7.4 13.4*   HGB 10.6* 13.7   MCV 85 82    492*      Most Recent 3 BMP's:  Recent Labs   Lab Test 05/14/21  1155 05/14/21  0653 05/13/21  1451   NA  --  133 124*   POTASSIUM 3.6 3.3* 3.6   CHLORIDE  --  98 85*   CO2  --  32 25   BUN  --  57* 64*   CR  --  2.35* 3.48*   ANIONGAP  --  3 14   DIMITRI  --  8.4* 9.5   GLC  --  127* 169*     Most Recent 2 LFT's:  Recent Labs   Lab Test 05/13/21  1451   AST 21   ALT 25   ALKPHOS 115   BILITOTAL 0.6     Most Recent INR's and Anticoagulation Dosing History:  Anticoagulation Dose History     There is no flowsheet data to display.        Most Recent 3 Troponin's:  Recent Labs   Lab Test 05/13/21  1451   TROPI <0.015     Most Recent Cholesterol Panel:No lab results found.  Most Recent 6 Bacteria Isolates From Any Culture (See EPIC Reports for Culture Details):No lab results found.  Most Recent TSH, T4 and A1c Labs:  Recent Labs   Lab Test 05/13/21  1451   TSH 0.93   A1C 6.2*      Results for orders placed or performed during the hospital encounter of 05/13/21   CT Abdomen Pelvis w/o Contrast    Narrative    EXAM: CT ABDOMEN PELVIS W/O CONTRAST  LOCATION: [facility]  DATE/TIME: 5/13/2021 7:37 PM    INDICATION: Abdominal pain, acute, nonlocalized  COMPARISON: 9/11/2018  TECHNIQUE: CT scan of the abdomen and pelvis was performed without IV contrast. Multiplanar reformats were obtained. Dose reduction techniques were used.  CONTRAST: None.    FINDINGS:   LOWER CHEST: Normal.    HEPATOBILIARY: Normal.    PANCREAS: Normal.    SPLEEN: Normal.    ADRENAL GLANDS: Normal.    KIDNEYS/BLADDER: Normal.    BOWEL: No obstruction or inflammatory change. Appendix normal.    LYMPH NODES: Normal.    VASCULATURE: Unremarkable.    PELVIC ORGANS: Vaginal collection device, surrounds cervix. No adnexal lesions. Small calcified uterine fibroids.    MUSCULOSKELETAL: No suspicious bony lesions. Significant interval weight loss.      Impression    IMPRESSION:   1.  No etiology for symptoms evident. Nothing obstructive or inflammatory.       Disclaimer: This note consists of symbols derived from keyboarding, dictation and/or voice recognition software. As a result, there may be errors in the script that have gone undetected. Please consider this when interpreting information found in this chart.

## 2021-05-14 NOTE — PLAN OF CARE
End of Shift Summary  For vital signs and complete assessments, please see documentation flowsheets.     Pertinent assessments: Pt complains of left  side abdominal pain.  Intermittent  nausea but none since  arrived from ER. Declines eating. No BM since Saturday, Dulcolax suppository given. Hypo bowel sounds. Denies passing flatus. CT abdomen and pelvis WNL. Lung sounds  diminished. Changing position for comfort. Pt sleeps most of shift. Tele ST with inverted T waves.   Treatment Plan: Medicate for comfort as well as position changes. IVF, medication for constipation, monitor bowel function, tele.

## 2021-05-14 NOTE — PLAN OF CARE
To Do:  End of Shift Summary  For vital signs and complete assessments, please see documentation flowsheets.     Pertinent assessments: Pt SBA in room, A&O. Tele present. Urinated 1x on shift, no bm's. Denies n/v. IVF infusing. B.G. 127.     Treatment Plan: Medicate for comfort as well as position changes. IVF, medication for constipation, monitor bowel function, tele.  Bedside Nurse: Sandra PATINO RN

## 2021-06-11 ENCOUNTER — HOSPITAL ENCOUNTER (OUTPATIENT)
Facility: CLINIC | Age: 31
Setting detail: OBSERVATION
Discharge: HOME | End: 2021-06-12
Attending: EMERGENCY MEDICINE | Admitting: INTERNAL MEDICINE
Payer: COMMERCIAL

## 2021-06-11 DIAGNOSIS — E87.6 HYPOKALEMIA: ICD-10-CM

## 2021-06-11 DIAGNOSIS — N28.89 RENAL SCARRING: ICD-10-CM

## 2021-06-11 DIAGNOSIS — N18.32 STAGE 3B CHRONIC KIDNEY DISEASE (H): ICD-10-CM

## 2021-06-11 DIAGNOSIS — R10.12 ABDOMINAL PAIN, LEFT UPPER QUADRANT: ICD-10-CM

## 2021-06-11 DIAGNOSIS — R80.1 PERSISTENT PROTEINURIA: ICD-10-CM

## 2021-06-11 DIAGNOSIS — N17.9 ACUTE KIDNEY INJURY (H): Primary | ICD-10-CM

## 2021-06-11 DIAGNOSIS — R11.2 NON-INTRACTABLE VOMITING WITH NAUSEA, UNSPECIFIED VOMITING TYPE: ICD-10-CM

## 2021-06-11 DIAGNOSIS — E87.1 HYPONATREMIA: ICD-10-CM

## 2021-06-11 LAB
ALBUMIN SERPL-MCNC: 4.5 G/DL (ref 3.4–5)
ALBUMIN UR-MCNC: 10 MG/DL
ALP SERPL-CCNC: 107 U/L (ref 40–150)
ALT SERPL W P-5'-P-CCNC: 24 U/L (ref 0–50)
ANION GAP SERPL CALCULATED.3IONS-SCNC: 10 MMOL/L (ref 3–14)
APPEARANCE UR: CLEAR
AST SERPL W P-5'-P-CCNC: 15 U/L (ref 0–45)
BASOPHILS # BLD AUTO: 0 10E9/L (ref 0–0.2)
BASOPHILS NFR BLD AUTO: 0.3 %
BILIRUB DIRECT SERPL-MCNC: 0.1 MG/DL (ref 0–0.2)
BILIRUB SERPL-MCNC: 0.6 MG/DL (ref 0.2–1.3)
BILIRUB UR QL STRIP: NEGATIVE
BUN SERPL-MCNC: 61 MG/DL (ref 7–30)
CALCIUM SERPL-MCNC: 9.5 MG/DL (ref 8.5–10.1)
CHLORIDE SERPL-SCNC: 90 MMOL/L (ref 94–109)
CO2 SERPL-SCNC: 25 MMOL/L (ref 20–32)
COLOR UR AUTO: ABNORMAL
CREAT SERPL-MCNC: 2.05 MG/DL (ref 0.52–1.04)
DIFFERENTIAL METHOD BLD: ABNORMAL
EOSINOPHIL # BLD AUTO: 0 10E9/L (ref 0–0.7)
EOSINOPHIL NFR BLD AUTO: 0.4 %
ERYTHROCYTE [DISTWIDTH] IN BLOOD BY AUTOMATED COUNT: 11.9 % (ref 10–15)
GFR SERPL CREATININE-BSD FRML MDRD: 31 ML/MIN/{1.73_M2}
GLUCOSE SERPL-MCNC: 119 MG/DL (ref 70–99)
GLUCOSE UR STRIP-MCNC: NEGATIVE MG/DL
HCG UR QL: NEGATIVE
HCT VFR BLD AUTO: 33.5 % (ref 35–47)
HGB BLD-MCNC: 12.1 G/DL (ref 11.7–15.7)
HGB UR QL STRIP: ABNORMAL
IMM GRANULOCYTES # BLD: 0 10E9/L (ref 0–0.4)
IMM GRANULOCYTES NFR BLD: 0.1 %
KETONES UR STRIP-MCNC: NEGATIVE MG/DL
LABORATORY COMMENT REPORT: NORMAL
LEUKOCYTE ESTERASE UR QL STRIP: NEGATIVE
LIPASE SERPL-CCNC: 119 U/L (ref 73–393)
LYMPHOCYTES # BLD AUTO: 2.4 10E9/L (ref 0.8–5.3)
LYMPHOCYTES NFR BLD AUTO: 31 %
MAGNESIUM SERPL-MCNC: 2.7 MG/DL (ref 1.6–2.3)
MCH RBC QN AUTO: 28.7 PG (ref 26.5–33)
MCHC RBC AUTO-ENTMCNC: 36.1 G/DL (ref 31.5–36.5)
MCV RBC AUTO: 79 FL (ref 78–100)
MONOCYTES # BLD AUTO: 1.3 10E9/L (ref 0–1.3)
MONOCYTES NFR BLD AUTO: 17.1 %
NEUTROPHILS # BLD AUTO: 3.9 10E9/L (ref 1.6–8.3)
NEUTROPHILS NFR BLD AUTO: 51.1 %
NITRATE UR QL: NEGATIVE
NRBC # BLD AUTO: 0 10*3/UL
NRBC BLD AUTO-RTO: 0 /100
PH UR STRIP: 5 PH (ref 5–7)
PLATELET # BLD AUTO: 464 10E9/L (ref 150–450)
POTASSIUM SERPL-SCNC: 2.9 MMOL/L (ref 3.4–5.3)
PROT SERPL-MCNC: 8.9 G/DL (ref 6.8–8.8)
RBC # BLD AUTO: 4.22 10E12/L (ref 3.8–5.2)
RBC #/AREA URNS AUTO: 1 /HPF (ref 0–2)
SARS-COV-2 RNA RESP QL NAA+PROBE: NEGATIVE
SODIUM SERPL-SCNC: 125 MMOL/L (ref 133–144)
SOURCE: ABNORMAL
SP GR UR STRIP: 1.01 (ref 1–1.03)
SPECIMEN SOURCE: NORMAL
SQUAMOUS #/AREA URNS AUTO: 1 /HPF (ref 0–1)
UROBILINOGEN UR STRIP-MCNC: NORMAL MG/DL (ref 0–2)
WBC # BLD AUTO: 7.6 10E9/L (ref 4–11)
WBC #/AREA URNS AUTO: 1 /HPF (ref 0–5)

## 2021-06-11 PROCEDURE — 80048 BASIC METABOLIC PNL TOTAL CA: CPT | Performed by: EMERGENCY MEDICINE

## 2021-06-11 PROCEDURE — C9803 HOPD COVID-19 SPEC COLLECT: HCPCS

## 2021-06-11 PROCEDURE — 87635 SARS-COV-2 COVID-19 AMP PRB: CPT | Performed by: EMERGENCY MEDICINE

## 2021-06-11 PROCEDURE — 84166 PROTEIN E-PHORESIS/URINE/CSF: CPT | Mod: TC | Performed by: STUDENT IN AN ORGANIZED HEALTH CARE EDUCATION/TRAINING PROGRAM

## 2021-06-11 PROCEDURE — 99285 EMERGENCY DEPT VISIT HI MDM: CPT | Mod: 25

## 2021-06-11 PROCEDURE — 81001 URINALYSIS AUTO W/SCOPE: CPT | Performed by: STUDENT IN AN ORGANIZED HEALTH CARE EDUCATION/TRAINING PROGRAM

## 2021-06-11 PROCEDURE — G0378 HOSPITAL OBSERVATION PER HR: HCPCS

## 2021-06-11 PROCEDURE — 96366 THER/PROPH/DIAG IV INF ADDON: CPT

## 2021-06-11 PROCEDURE — 96365 THER/PROPH/DIAG IV INF INIT: CPT

## 2021-06-11 PROCEDURE — 85025 COMPLETE CBC W/AUTO DIFF WBC: CPT | Performed by: EMERGENCY MEDICINE

## 2021-06-11 PROCEDURE — 83735 ASSAY OF MAGNESIUM: CPT | Performed by: EMERGENCY MEDICINE

## 2021-06-11 PROCEDURE — 258N000003 HC RX IP 258 OP 636: Performed by: PHYSICIAN ASSISTANT

## 2021-06-11 PROCEDURE — 250N000011 HC RX IP 250 OP 636: Performed by: STUDENT IN AN ORGANIZED HEALTH CARE EDUCATION/TRAINING PROGRAM

## 2021-06-11 PROCEDURE — 81025 URINE PREGNANCY TEST: CPT | Performed by: STUDENT IN AN ORGANIZED HEALTH CARE EDUCATION/TRAINING PROGRAM

## 2021-06-11 PROCEDURE — 84300 ASSAY OF URINE SODIUM: CPT | Performed by: STUDENT IN AN ORGANIZED HEALTH CARE EDUCATION/TRAINING PROGRAM

## 2021-06-11 PROCEDURE — 93005 ELECTROCARDIOGRAM TRACING: CPT

## 2021-06-11 PROCEDURE — 250N000013 HC RX MED GY IP 250 OP 250 PS 637: Performed by: EMERGENCY MEDICINE

## 2021-06-11 PROCEDURE — 258N000003 HC RX IP 258 OP 636: Performed by: STUDENT IN AN ORGANIZED HEALTH CARE EDUCATION/TRAINING PROGRAM

## 2021-06-11 PROCEDURE — 84166 PROTEIN E-PHORESIS/URINE/CSF: CPT | Mod: 26 | Performed by: PATHOLOGY

## 2021-06-11 PROCEDURE — 250N000013 HC RX MED GY IP 250 OP 250 PS 637: Performed by: PHYSICIAN ASSISTANT

## 2021-06-11 PROCEDURE — 250N000011 HC RX IP 250 OP 636: Performed by: EMERGENCY MEDICINE

## 2021-06-11 PROCEDURE — 99220 PR INITIAL OBSERVATION CARE,LEVEL III: CPT | Performed by: PHYSICIAN ASSISTANT

## 2021-06-11 PROCEDURE — 83690 ASSAY OF LIPASE: CPT | Performed by: EMERGENCY MEDICINE

## 2021-06-11 PROCEDURE — 80076 HEPATIC FUNCTION PANEL: CPT | Performed by: EMERGENCY MEDICINE

## 2021-06-11 PROCEDURE — 96375 TX/PRO/DX INJ NEW DRUG ADDON: CPT

## 2021-06-11 RX ORDER — NALOXONE HYDROCHLORIDE 0.4 MG/ML
0.4 INJECTION, SOLUTION INTRAMUSCULAR; INTRAVENOUS; SUBCUTANEOUS
Status: DISCONTINUED | OUTPATIENT
Start: 2021-06-11 | End: 2021-06-12 | Stop reason: HOSPADM

## 2021-06-11 RX ORDER — AMOXICILLIN 250 MG
1 CAPSULE ORAL 2 TIMES DAILY PRN
Status: DISCONTINUED | OUTPATIENT
Start: 2021-06-11 | End: 2021-06-12 | Stop reason: HOSPADM

## 2021-06-11 RX ORDER — SODIUM CHLORIDE 9 MG/ML
INJECTION, SOLUTION INTRAVENOUS CONTINUOUS
Status: DISCONTINUED | OUTPATIENT
Start: 2021-06-11 | End: 2021-06-11

## 2021-06-11 RX ORDER — POLYETHYLENE GLYCOL 3350 17 G/17G
17 POWDER, FOR SOLUTION ORAL DAILY
Status: DISCONTINUED | OUTPATIENT
Start: 2021-06-12 | End: 2021-06-12 | Stop reason: HOSPADM

## 2021-06-11 RX ORDER — HALOPERIDOL 5 MG/ML
5 INJECTION INTRAMUSCULAR ONCE
Status: COMPLETED | OUTPATIENT
Start: 2021-06-11 | End: 2021-06-11

## 2021-06-11 RX ORDER — POTASSIUM CHLORIDE 7.45 MG/ML
10 INJECTION INTRAVENOUS
Status: DISCONTINUED | OUTPATIENT
Start: 2021-06-11 | End: 2021-06-11

## 2021-06-11 RX ORDER — ACETAMINOPHEN 325 MG/1
650 TABLET ORAL EVERY 4 HOURS PRN
Status: DISCONTINUED | OUTPATIENT
Start: 2021-06-11 | End: 2021-06-12 | Stop reason: HOSPADM

## 2021-06-11 RX ORDER — LIDOCAINE 40 MG/G
CREAM TOPICAL
Status: DISCONTINUED | OUTPATIENT
Start: 2021-06-11 | End: 2021-06-12 | Stop reason: HOSPADM

## 2021-06-11 RX ORDER — POTASSIUM CHLORIDE 1500 MG/1
40 TABLET, EXTENDED RELEASE ORAL ONCE
Status: COMPLETED | OUTPATIENT
Start: 2021-06-11 | End: 2021-06-11

## 2021-06-11 RX ORDER — SODIUM CHLORIDE 9 MG/ML
INJECTION, SOLUTION INTRAVENOUS CONTINUOUS
Status: DISCONTINUED | OUTPATIENT
Start: 2021-06-11 | End: 2021-06-12

## 2021-06-11 RX ORDER — AMOXICILLIN 250 MG
2 CAPSULE ORAL 2 TIMES DAILY PRN
Status: DISCONTINUED | OUTPATIENT
Start: 2021-06-11 | End: 2021-06-12 | Stop reason: HOSPADM

## 2021-06-11 RX ORDER — ONDANSETRON 2 MG/ML
4 INJECTION INTRAMUSCULAR; INTRAVENOUS EVERY 6 HOURS PRN
Status: DISCONTINUED | OUTPATIENT
Start: 2021-06-11 | End: 2021-06-12 | Stop reason: HOSPADM

## 2021-06-11 RX ORDER — NALOXONE HYDROCHLORIDE 0.4 MG/ML
0.2 INJECTION, SOLUTION INTRAMUSCULAR; INTRAVENOUS; SUBCUTANEOUS
Status: DISCONTINUED | OUTPATIENT
Start: 2021-06-11 | End: 2021-06-12 | Stop reason: HOSPADM

## 2021-06-11 RX ORDER — DIPHENHYDRAMINE HYDROCHLORIDE 50 MG/ML
25 INJECTION INTRAMUSCULAR; INTRAVENOUS ONCE
Status: COMPLETED | OUTPATIENT
Start: 2021-06-11 | End: 2021-06-11

## 2021-06-11 RX ORDER — ONDANSETRON 4 MG/1
4 TABLET, ORALLY DISINTEGRATING ORAL EVERY 6 HOURS PRN
Status: DISCONTINUED | OUTPATIENT
Start: 2021-06-11 | End: 2021-06-12 | Stop reason: HOSPADM

## 2021-06-11 RX ORDER — PROCHLORPERAZINE 25 MG
25 SUPPOSITORY, RECTAL RECTAL EVERY 12 HOURS PRN
Status: DISCONTINUED | OUTPATIENT
Start: 2021-06-11 | End: 2021-06-12 | Stop reason: HOSPADM

## 2021-06-11 RX ORDER — ONDANSETRON 2 MG/ML
4 INJECTION INTRAMUSCULAR; INTRAVENOUS EVERY 30 MIN PRN
Status: DISCONTINUED | OUTPATIENT
Start: 2021-06-11 | End: 2021-06-11

## 2021-06-11 RX ORDER — LATANOPROST 50 UG/ML
1 SOLUTION/ DROPS OPHTHALMIC AT BEDTIME
Status: ON HOLD | COMMUNITY
End: 2021-10-07

## 2021-06-11 RX ORDER — PROCHLORPERAZINE MALEATE 10 MG
10 TABLET ORAL EVERY 6 HOURS PRN
Status: DISCONTINUED | OUTPATIENT
Start: 2021-06-11 | End: 2021-06-12 | Stop reason: HOSPADM

## 2021-06-11 RX ORDER — ACETAMINOPHEN 650 MG/1
650 SUPPOSITORY RECTAL EVERY 4 HOURS PRN
Status: DISCONTINUED | OUTPATIENT
Start: 2021-06-11 | End: 2021-06-12 | Stop reason: HOSPADM

## 2021-06-11 RX ORDER — HYDROMORPHONE HYDROCHLORIDE 1 MG/ML
0.5 INJECTION, SOLUTION INTRAMUSCULAR; INTRAVENOUS; SUBCUTANEOUS
Status: DISCONTINUED | OUTPATIENT
Start: 2021-06-11 | End: 2021-06-12 | Stop reason: HOSPADM

## 2021-06-11 RX ORDER — OXYCODONE HYDROCHLORIDE 5 MG/1
5-10 TABLET ORAL
Status: DISCONTINUED | OUTPATIENT
Start: 2021-06-11 | End: 2021-06-12 | Stop reason: HOSPADM

## 2021-06-11 RX ADMIN — POTASSIUM CHLORIDE 40 MEQ: 1500 TABLET, EXTENDED RELEASE ORAL at 16:53

## 2021-06-11 RX ADMIN — POTASSIUM CHLORIDE 40 MEQ: 1500 TABLET, EXTENDED RELEASE ORAL at 22:04

## 2021-06-11 RX ADMIN — SODIUM CHLORIDE 1000 ML: 9 INJECTION, SOLUTION INTRAVENOUS at 16:52

## 2021-06-11 RX ADMIN — DIPHENHYDRAMINE HYDROCHLORIDE 25 MG: 50 INJECTION INTRAMUSCULAR; INTRAVENOUS at 16:52

## 2021-06-11 RX ADMIN — POTASSIUM CHLORIDE 10 MEQ: 7.46 INJECTION, SOLUTION INTRAVENOUS at 16:53

## 2021-06-11 RX ADMIN — HALOPERIDOL LACTATE 5 MG: 5 INJECTION, SOLUTION INTRAMUSCULAR at 16:52

## 2021-06-11 RX ADMIN — OXYCODONE HYDROCHLORIDE 5 MG: 5 TABLET ORAL at 22:12

## 2021-06-11 RX ADMIN — SODIUM CHLORIDE: 9 INJECTION, SOLUTION INTRAVENOUS at 22:09

## 2021-06-11 RX ADMIN — ONDANSETRON 4 MG: 2 INJECTION INTRAMUSCULAR; INTRAVENOUS at 16:52

## 2021-06-11 ASSESSMENT — ENCOUNTER SYMPTOMS
APPETITE CHANGE: 1
SHORTNESS OF BREATH: 0
CHILLS: 0
PALPITATIONS: 0
COUGH: 0
ABDOMINAL PAIN: 1
NECK PAIN: 0
COLOR CHANGE: 0
NAUSEA: 1
POLYDIPSIA: 0
LIGHT-HEADEDNESS: 0
DIAPHORESIS: 0
VOMITING: 1
FREQUENCY: 0
FEVER: 0
ACTIVITY CHANGE: 0
DIZZINESS: 0
BACK PAIN: 0

## 2021-06-11 ASSESSMENT — MIFFLIN-ST. JEOR: SCORE: 1384.47

## 2021-06-11 NOTE — ED PROVIDER NOTES
History     Chief Complaint:  Abdominal Pain      HPI   Kym Lemus is a 30 year old female with significant history of type 2 diabetes diet-controlled, chronic abdominal pain, previous DEREK's who presents with abdominal pain.  She states that she has been eating an apple every night to go to sleep for at least the past week, she is not sure if her nausea is worse.  Over the last 2 days her abdominal pain in her left upper quadrant has gotten significantly worse.  She states it is identical in quality and characteristic to her previous abdominal pain, isolated to the left upper quadrant and epigastric region, accompanied by nausea with vomiting, no tolerance of solid foods able to tolerate some small amounts of liquids.  At this time she describes the pain as 7 out of 10 in severity.  She denies fevers, chills, urinary symptoms, flank pain, rash, sensitivity to light touch, bowel changes.  She states she is unable to tolerate her pain at home today and so she presents herself for evaluation and intervention.    Review of Systems   Constitutional: Positive for appetite change. Negative for activity change, chills, diaphoresis and fever.   Respiratory: Negative for cough and shortness of breath.    Cardiovascular: Negative for chest pain and palpitations.   Gastrointestinal: Positive for abdominal pain, nausea and vomiting.   Endocrine: Negative for polydipsia and polyuria.   Genitourinary: Negative for frequency and urgency.   Musculoskeletal: Negative for back pain, gait problem and neck pain.   Skin: Negative for color change and rash.   Neurological: Negative for dizziness and light-headedness.   All other systems reviewed and are negative.        Allergies:  Metformin      Medications:    latanoprost (XALATAN) 0.005 % ophthalmic solution  nortriptyline (PAMELOR) 10 MG capsule        Past Medical History:    Past Medical History:   Diagnosis Date     Cannabinoid hyperemesis syndrome      Diabetes mellitus  type 2, diet-controlled (H)      Patient Active Problem List    Diagnosis Date Noted     Abdominal pain, left upper quadrant 06/11/2021     Priority: Medium     Hypokalemia 06/11/2021     Priority: Medium     Abdominal pain, generalized 05/13/2021     Priority: Medium     Hyponatremia 05/13/2021     Priority: Medium     Acute kidney injury (H) 05/13/2021     Priority: Medium     Non-intractable vomiting with nausea, unspecified vomiting type 05/13/2021     Priority: Medium        Past Surgical History:      History reviewed. No pertinent surgical history.    Family History:      History reviewed. No pertinent family history.    Social History:  Eats edibles and smokes marijuana, no other drugs, no alcohol, no tobacco    Physical Exam     Patient Vitals for the past 24 hrs:   BP Temp Temp src Pulse Resp SpO2   06/11/21 1830 -- -- -- -- -- 100 %   06/11/21 1815 -- -- -- -- -- 99 %   06/11/21 1745 -- -- -- -- -- 100 %   06/11/21 1730 -- -- -- -- -- 100 %   06/11/21 1715 -- -- -- -- -- 99 %   06/11/21 1705 (!) 163/116 -- -- 113 -- 96 %   06/11/21 1423 (!) 167/129 97.6  F (36.4  C) Temporal 116 18 100 %       Physical Exam  Vitals signs and nursing note reviewed.   Constitutional:       Comments: Alert alert woman standing, rocking, in moderate distress today, rocking, in moderate distress   HENT:      Head: Normocephalic.   Eyes:      General: No scleral icterus.     Extraocular Movements: Extraocular movements intact.   Cardiovascular:      Rate and Rhythm: Regular rhythm. Tachycardia present.   Pulmonary:      Effort: Pulmonary effort is normal.      Breath sounds: Normal breath sounds.   Abdominal:      General: Abdomen is flat. Bowel sounds are normal.      Palpations: Abdomen is soft.      Tenderness: There is abdominal tenderness in the epigastric area and left upper quadrant. There is no right CVA tenderness, left CVA tenderness, guarding or rebound.   Skin:     General: Skin is warm and dry.      Capillary  Refill: Capillary refill takes less than 2 seconds.      Findings: No rash.   Neurological:      General: No focal deficit present.   Psychiatric:      Comments: Tearful         Emergency Department Course   ECG:  ECG taken at 1726, ECG read at 1736  Sinus tachycardia   Nonspecific ST and T wave abnormality    Rate 106 bpm. NM interval 154 ms. QRS duration 84 ms. QT/QTc 368/488 ms. P-R-T axes 71 88 56.     Imaging:  No orders to display       Laboratory:  CMP: , potassium 3.9, chloride 90, creatinine 2.05, LFTs wnl, glucose 119  Ma.7  Lipase 119  UPT negative  UA Trace blood, no leuk est or nitrites    Emergency Department Course:    Reviewed:  I reviewed nursing notes, vitals, past history and care everywhere    Assessments:  1630 I obtained history and examined the patient as noted above. Hypok noted, patient given 20mEq potassium IV and 40 mEq PO.  0 I rechecked the patient, she is groggy after the haldol but states that her pain is gone. We discussed that we may want to admit her for rehydration and electrolyte repletion.   0 Patient is feeling much improved and is requesting to go home. She has antiemetics at home and we discussed that it is important for her to follow up with her Primary care early next week to recheck her salts and her kidneys. Upon repeat discussion with my colleague patient is electing to stay in Obs.     Interventions:  Medications   ondansetron (ZOFRAN) injection 4 mg (4 mg Intravenous Given 21)   0.9% sodium chloride BOLUS (0 mLs Intravenous Stopped 21)     Followed by   sodium chloride 0.9% infusion (has no administration in time range)   potassium chloride 10 mEq in 100 mL sterile water intermittent infusion (premix) (0 mEq Intravenous Stopped 21)   haloperidol lactate (HALDOL) injection 5 mg (5 mg Intravenous Given 21)   diphenhydrAMINE (BENADRYL) injection 25 mg (25 mg Intravenous Given 21)   potassium chloride ER  (KLOR-CON M) CR tablet 40 mEq (40 mEq Oral Given 21 7044)       Disposition:  The patient was discharged to home.    Impression & Plan      Medical Decision Makin-year-old woman significant history of chronic abdominal pain, cannabinoid hyperemesis, type 2 diabetes controlled by diet presenting with flare of her chronic abdominal pain.  Left upper quadrant abdominal pain, differential diagnosis includes but is not limited to flare of her noted hyperemesis, gastritis or gastroenteritis, GERD flare, pancreatitis, cholelithiasis, pyelonephritis, kidney stone, colitis.  Patient is tachycardic and hypertensive during pain episode, labs significant for Hyponatremia 125, hypokalemia 2.9, hypochloremia 90, creatinine 2.05 (per chart review baseline should be around 1.3 as of 2021). WBC WNL, LFTs WNL, lipase 119. EKG w/QTc 488. Patient given haldol, IVF, electrolyte repletion, zofran, benadryl and had significant improvement of her symptoms. We offered admission to obs, patient accepted. She will be admitted for monitoring of her DEREK, IVF.   Critical Care time:  none    Covid-19  Anitafranc NIEVES Early was evaluated during a global COVID-19 pandemic, which necessitated consideration that the patient might be at risk for infection with the SARS-CoV-2 virus that causes COVID-19.   Applicable protocols for evaluation were followed during the patient's care.   COVID-19 was considered as part of the patient's evaluation. The plan for testing is:  a test was obtained during this visit.    Diagnosis:    ICD-10-CM    1. Acute kidney injury (H)  N17.9 UA with Microscopic     HCG qualitative urine (UPT)     Magnesium     Magnesium     Asymptomatic SARS-CoV-2 COVID-19 Virus (Coronavirus) by PCR     CANCELED: Magnesium   2. Hypokalemia  E87.6    3. Hyponatremia  E87.1    4. Non-intractable vomiting with nausea, unspecified vomiting type  R11.2    5. Abdominal pain, left upper quadrant  R10.12        Discharge  Medications:  New Prescriptions    No medications on file     MD Maurilio Mahmood, Margie Fuentes MD  Resident  06/11/21 1927       Margie Thorpe MD  Resident  06/11/21 1946

## 2021-06-11 NOTE — ED TRIAGE NOTES
A&O x4, ABCs intact. Pt presents with LUQ abdominal pain that started last Friday. Pt also has vomiting, that stopped, and now it has started again. Pt denies alcohol use. She reports that she hasn't had a BM since last Saturday. She tried an enema without relief.

## 2021-06-11 NOTE — ED NOTES
[facility]  ED Nurse Handoff Report    Kym Lemus is a 30 year old female   ED Chief complaint: Abdominal Pain  . ED Diagnosis:   Final diagnoses:   Acute kidney injury (H)   Hypokalemia   Hyponatremia   Non-intractable vomiting with nausea, unspecified vomiting type   Abdominal pain, left upper quadrant     Allergies:   Allergies   Allergen Reactions     Metformin Diarrhea       Code Status: Full Code  Activity level - Baseline/Home:  Independent. Activity Level - Current:   Independent. Lift room needed: No. Bariatric: No   Needed: No   Isolation: No. Infection: Not Applicable.     Vital Signs:   Vitals:    06/11/21 1730 06/11/21 1745 06/11/21 1815 06/11/21 1830   BP:       Pulse:       Resp:       Temp:       TempSrc:       SpO2: 100% 100% 99% 100%       Cardiac Rhythm:  ,      Pain level:    Patient confused: No. Patient Falls Risk: No.   Elimination Status: Has voided   Patient Report - Initial Complaint: abd pain. Focused Assessment: abd tender to palpation   Tests Performed: blood, UA, EKG. Abnormal Results:   Labs Ordered and Resulted from Time of ED Arrival Up to the Time of Departure from the ED   CBC WITH PLATELETS DIFFERENTIAL - Abnormal; Notable for the following components:       Result Value    Hematocrit 33.5 (*)     Platelet Count 464 (*)     All other components within normal limits   BASIC METABOLIC PANEL - Abnormal; Notable for the following components:    Sodium 125 (*)     Potassium 2.9 (*)     Chloride 90 (*)     Glucose 119 (*)     Urea Nitrogen 61 (*)     Creatinine 2.05 (*)     GFR Estimate 31 (*)     GFR Estimate If Black 36 (*)     All other components within normal limits   HEPATIC PANEL - Abnormal; Notable for the following components:    Protein Total 8.9 (*)     All other components within normal limits   ROUTINE UA WITH MICROSCOPIC - Abnormal; Notable for the following components:    Blood Urine Trace (*)     Protein Albumin Urine 10 (*)     All other  components within normal limits   MAGNESIUM - Abnormal; Notable for the following components:    Magnesium 2.7 (*)     All other components within normal limits   LIPASE   HCG QUALITATIVE URINE   PERIPHERAL IV CATHETER     No orders to display     .   Treatments provided: zofran, haldol, benadryl, potassium, fluids  Family Comments: NA  OBS brochure/video discussed/provided to patient: Yes  ED Medications:   Medications   ondansetron (ZOFRAN) injection 4 mg (4 mg Intravenous Given 6/11/21 1652)   0.9% sodium chloride BOLUS (1,000 mLs Intravenous New Bag 6/11/21 1652)     Followed by   sodium chloride 0.9% infusion (has no administration in time range)   potassium chloride 10 mEq in 100 mL sterile water intermittent infusion (premix) (10 mEq Intravenous New Bag 6/11/21 1653)   haloperidol lactate (HALDOL) injection 5 mg (5 mg Intravenous Given 6/11/21 1652)   diphenhydrAMINE (BENADRYL) injection 25 mg (25 mg Intravenous Given 6/11/21 1652)   potassium chloride ER (KLOR-CON M) CR tablet 40 mEq (40 mEq Oral Given 6/11/21 1653)     Drips infusing:  No  For the majority of the shift, the patient's behavior Green. Interventions performed were NA.    Sepsis treatment initiated: No     Patient tested for COVID 19 prior to admission: YES    ED Nurse Name/Phone Number: Ailyn Ramos RN,   6:38 PM    RECEIVING UNIT ED HANDOFF REVIEW    Above ED Nurse Handoff Report was reviewed: yes  Reviewed by: Evangelist Clemente RN on June 11, 2021 at 8:29 PM

## 2021-06-12 VITALS
DIASTOLIC BLOOD PRESSURE: 90 MMHG | HEIGHT: 65 IN | WEIGHT: 148.5 LBS | OXYGEN SATURATION: 99 % | SYSTOLIC BLOOD PRESSURE: 125 MMHG | BODY MASS INDEX: 24.74 KG/M2 | HEART RATE: 104 BPM | TEMPERATURE: 98.1 F | RESPIRATION RATE: 18 BRPM

## 2021-06-12 LAB
ANION GAP SERPL CALCULATED.3IONS-SCNC: 5 MMOL/L (ref 3–14)
BUN SERPL-MCNC: 40 MG/DL (ref 7–30)
CALCIUM SERPL-MCNC: 8.7 MG/DL (ref 8.5–10.1)
CHLORIDE SERPL-SCNC: 100 MMOL/L (ref 94–109)
CO2 SERPL-SCNC: 25 MMOL/L (ref 20–32)
CREAT SERPL-MCNC: 1.64 MG/DL (ref 0.52–1.04)
ERYTHROCYTE [DISTWIDTH] IN BLOOD BY AUTOMATED COUNT: 11.9 % (ref 10–15)
GFR SERPL CREATININE-BSD FRML MDRD: 41 ML/MIN/{1.73_M2}
GLUCOSE SERPL-MCNC: 119 MG/DL (ref 70–99)
HCT VFR BLD AUTO: 32.9 % (ref 35–47)
HGB BLD-MCNC: 11.2 G/DL (ref 11.7–15.7)
MCH RBC QN AUTO: 27.9 PG (ref 26.5–33)
MCHC RBC AUTO-ENTMCNC: 34 G/DL (ref 31.5–36.5)
MCV RBC AUTO: 82 FL (ref 78–100)
OSMOLALITY SERPL: 283 MMOL/KG (ref 275–295)
PLATELET # BLD AUTO: 394 10E9/L (ref 150–450)
POTASSIUM SERPL-SCNC: 3.4 MMOL/L (ref 3.4–5.3)
RBC # BLD AUTO: 4.01 10E12/L (ref 3.8–5.2)
SODIUM SERPL-SCNC: 130 MMOL/L (ref 133–144)
SODIUM UR-SCNC: 9 MMOL/L
URATE SERPL-MCNC: 6.3 MG/DL (ref 2.6–6)
WBC # BLD AUTO: 5.7 10E9/L (ref 4–11)

## 2021-06-12 PROCEDURE — 80048 BASIC METABOLIC PNL TOTAL CA: CPT | Performed by: PHYSICIAN ASSISTANT

## 2021-06-12 PROCEDURE — G0378 HOSPITAL OBSERVATION PER HR: HCPCS

## 2021-06-12 PROCEDURE — 999N001036 HC STATISTIC TOTAL PROTEIN: Performed by: PHYSICIAN ASSISTANT

## 2021-06-12 PROCEDURE — 250N000011 HC RX IP 250 OP 636: Performed by: PHYSICIAN ASSISTANT

## 2021-06-12 PROCEDURE — 96375 TX/PRO/DX INJ NEW DRUG ADDON: CPT

## 2021-06-12 PROCEDURE — 258N000003 HC RX IP 258 OP 636: Performed by: PHYSICIAN ASSISTANT

## 2021-06-12 PROCEDURE — 84165 PROTEIN E-PHORESIS SERUM: CPT | Mod: TC | Performed by: PHYSICIAN ASSISTANT

## 2021-06-12 PROCEDURE — 85027 COMPLETE CBC AUTOMATED: CPT | Performed by: PHYSICIAN ASSISTANT

## 2021-06-12 PROCEDURE — 84550 ASSAY OF BLOOD/URIC ACID: CPT | Performed by: PHYSICIAN ASSISTANT

## 2021-06-12 PROCEDURE — 250N000013 HC RX MED GY IP 250 OP 250 PS 637: Performed by: PHYSICIAN ASSISTANT

## 2021-06-12 PROCEDURE — 84165 PROTEIN E-PHORESIS SERUM: CPT | Mod: 26 | Performed by: PATHOLOGY

## 2021-06-12 PROCEDURE — 36415 COLL VENOUS BLD VENIPUNCTURE: CPT | Performed by: PHYSICIAN ASSISTANT

## 2021-06-12 PROCEDURE — 83930 ASSAY OF BLOOD OSMOLALITY: CPT | Performed by: PHYSICIAN ASSISTANT

## 2021-06-12 PROCEDURE — 999N001160 HC STATISICAL ALDOSTERONE/RENIN RATIO: Performed by: PHYSICIAN ASSISTANT

## 2021-06-12 PROCEDURE — 82088 ASSAY OF ALDOSTERONE: CPT | Performed by: PHYSICIAN ASSISTANT

## 2021-06-12 PROCEDURE — 84244 ASSAY OF RENIN: CPT | Performed by: PHYSICIAN ASSISTANT

## 2021-06-12 PROCEDURE — 99217 PR OBSERVATION CARE DISCHARGE: CPT | Performed by: PHYSICIAN ASSISTANT

## 2021-06-12 RX ORDER — ONDANSETRON 8 MG/1
4-8 TABLET, FILM COATED ORAL EVERY 8 HOURS PRN
Qty: 15 TABLET | Refills: 0 | Status: SHIPPED | OUTPATIENT
Start: 2021-06-12 | End: 2021-06-12

## 2021-06-12 RX ORDER — OLANZAPINE 5 MG/1
2.5-5 TABLET ORAL EVERY 8 HOURS PRN
Qty: 15 TABLET | Refills: 0 | Status: SHIPPED | OUTPATIENT
Start: 2021-06-12 | End: 2021-08-24

## 2021-06-12 RX ORDER — PROCHLORPERAZINE MALEATE 10 MG
10 TABLET ORAL EVERY 6 HOURS PRN
Qty: 45 TABLET | Refills: 1 | Status: SHIPPED | OUTPATIENT
Start: 2021-06-12 | End: 2021-08-24

## 2021-06-12 RX ORDER — PROCHLORPERAZINE MALEATE 10 MG
10 TABLET ORAL EVERY 6 HOURS PRN
Qty: 10 TABLET | Refills: 0 | Status: SHIPPED | OUTPATIENT
Start: 2021-06-12 | End: 2021-06-12

## 2021-06-12 RX ORDER — ONDANSETRON 8 MG/1
4-8 TABLET, FILM COATED ORAL EVERY 8 HOURS PRN
Qty: 30 TABLET | Refills: 0 | Status: SHIPPED | OUTPATIENT
Start: 2021-06-12 | End: 2021-08-24

## 2021-06-12 RX ORDER — SODIUM CHLORIDE AND POTASSIUM CHLORIDE 150; 900 MG/100ML; MG/100ML
INJECTION, SOLUTION INTRAVENOUS CONTINUOUS
Status: DISPENSED | OUTPATIENT
Start: 2021-06-12 | End: 2021-06-12

## 2021-06-12 RX ADMIN — POLYETHYLENE GLYCOL 3350 17 G: 17 POWDER, FOR SOLUTION ORAL at 08:21

## 2021-06-12 RX ADMIN — POTASSIUM CHLORIDE AND SODIUM CHLORIDE: 900; 150 INJECTION, SOLUTION INTRAVENOUS at 11:02

## 2021-06-12 RX ADMIN — SODIUM CHLORIDE: 9 INJECTION, SOLUTION INTRAVENOUS at 06:16

## 2021-06-12 NOTE — PLAN OF CARE
"PRIMARY DIAGNOSIS: Acute abd pain/ Nausea/ vomiting  OUTPATIENT/OBSERVATION GOALS TO BE MET BEFORE DISCHARGE:  1. Pain Status: Improved-controlled with oral pain medications.    2. Return to near baseline physical activity: Yes    3. Cleared for discharge by consultants (if involved): No    Discharge Planner Nurse   Safe discharge environment identified: Yes  Barriers to discharge: Yes       Entered by: Evangelist Clemente 06/11/2021     Pt alert and oriented x4. Pain 5/10 in abdomen LUQ. Given oxycodone. Regular diet, she did not want to try solid food this evening. Up indep. NS @ 100 ml/hr. K replaced, recheck in am. Denies nausea. VSS. Will cont supportive cares.  BP (!) 162/98 (BP Location: Left arm)   Pulse 98   Temp 98.7  F (37.1  C) (Oral)   Resp 16   Ht 1.651 m (5' 5\")   Wt 67.4 kg (148 lb 8 oz)   SpO2 100%   BMI 24.71 kg/m      Please review provider order for any additional goals.   Nurse to notify provider when observation goals have been met and patient is ready for discharge.  "

## 2021-06-12 NOTE — PLAN OF CARE
PRIMARY DIAGNOSIS: ACUTE ABDOMINAL PAIN/ NAUSEA/ VOMITING  OUTPATIENT/OBSERVATION GOALS TO BE MET BEFORE DISCHARGE:  1. Pain Status: Pain free.    2. Return to near baseline physical activity: Yes    3. Cleared for discharge by consultants (if involved): Yes    Discharge Planner Nurse   Safe discharge environment identified: Yes  Barriers to discharge: No       Entered by: RENNY HALL 06/12/2021 12:44 PM  Vital signs:  Temp: 98.1  F (36.7  C) Temp src: Oral BP: (!) 125/90 Pulse: 104   Resp: 18 SpO2: 99 % O2 Device: None (Room air) Alert and oriented x4. Denies pain, nausea, vomiting this morning. Had light regular breakfast. Tolerated well. Pt also ordered lunch and waiting to eat. IVF with Kcl running @500mL/hr for 2hrs. Miralax given this morning for constipation.  and Kcl 3.4 this morning. Up independently. Pt to discharge when IVF completed. Pt to call mom for ride. Will continue to monitor.   Please review provider order for any additional goals.   Nurse to notify provider when observation goals have been met and patient is ready for discharge.

## 2021-06-12 NOTE — PLAN OF CARE
ROOM # 201    Living Situation (if not independent, order SW consult): Lives with self  Facility name:  : [facility]     Activity level at baseline: indep  Activity level on admit: indep      Patient registered to observation; given Patient Bill of Rights; given the opportunity to ask questions about observation status and their plan of care.  Patient has been oriented to the observation room, bathroom and call light is in place.    Discussed discharge goals and expectations with patient/family.

## 2021-06-12 NOTE — PLAN OF CARE
PRIMARY DIAGNOSIS: Acute abd pain/ Nausea/ vomiting  OUTPATIENT/OBSERVATION GOALS TO BE MET BEFORE DISCHARGE:  1. Pain Status: Improved-controlled with oral pain medications.    2. Return to near baseline physical activity: Yes    3. Cleared for discharge by consultants (if involved): No    Discharge Planner Nurse   Safe discharge environment identified: Yes  Barriers to discharge: Yes       Entered by: Mervat Luu 06/12/2021       Pt is alert and oriented x4. Very slow to respond and minimal response given. Pt has a flat affect and appears withdrawn. Reported pain 1/10 in LUQ and tenderness to touch. Declines any further interventions. On a regular diet, but has not eaten any solid foods. Up independently in the room. NS infusing at 100mL/hr. Currently denying any nausea or vomiting. Reports constipation as she has not had a BM for 6 days. Scheduled medications in place. Labs to be rechecked in AM. Will continue to provide supportive cares.  Please review provider order for any additional goals.   Nurse to notify provider when observation goals have been met and patient is ready for discharge.

## 2021-06-12 NOTE — DISCHARGE SUMMARY
[facility]  Discharge Summary  Hospitalist    Date of Admission:  6/11/2021  Date of Discharge:  6/12/2021    Discharging Provider: Bernice Hebert [facility]    Discharge Diagnoses   1. Intractable abdominal pain, nausea, and vomiting; improving  2. Hyponatremia (Na 125) and hypokalemia (K 2.9) of dehydration  3. Frequent admissions and ER visits for acute on chronic abdominal pain, nausea, vomiting, and subsequent dehydrations with electrolyte derangement.  Most recently 5/13-5/14/2021 at [facility] (previously hospitalizations were at [facility] or [place]).   4. Suspected cannabinoid hyperemesis, complicated by ongoing albeit intermittent use  5. Chronic abdominal pain followed by pain team and Neurology.  Started nortriptyline 4/2021  6. H/o gastritis.  EGD 11/2018 revealed mild reactive gastropathy of the stomach but no ulcerations; H. pylori negative.   7. Suspected gastroparesis related to DM2.  Gastric emptying study at [facility] 12/2018 reportedly negative.  Previously on scheduled metoclopramide.  Seen by GI at [facility] 4/2019 and symptoms still felt to be related to gastroparesis.   8. Proteinuria with evolving stage III-IV CKD, progressive since 6/2020.  Baseline Cr 1.3-1.6; previously 0.8-0.9.  Referral to Nephrology.  9. DM2.  Diagnosed in 2012.  Was following with [facility].  Previously on Lantus and glipizide.  Has had several admissions for DKA.  Currently diet controlled with HgbA1c 6.2 off antihyperglycemics.  10. Complex situation with fractured care related transitions between multiple medical specialties (Medicine, Nephrology, GI, and Endocrine) and multiple ED visits and admissions within various [facility] care systems ([facility], [facility], [facility], and [facility]).  Would benefit from consistent care within [facility] care system. Consider ED treatment plan.    Discharge Disposition     Discharged to home    Condition at Discharge:   Stable    History of Present Illness   Kym Lemus is a delightful 30 year old Black  with DM2 who has been struggling for the last 3 years with nausea, vomiting, and abdominal pain.  She does use occasional cannabis.  She also notes that she becomes nauseous and has increased abdominal pain related to episodes of increased stress.  She presented to [facility] ED on 6/11/2021 with intractable nausea, vomiting, and abdominal pain and was found to have profound dehydration with resulting hyponatremia, hypokalemia, and acute on chronic renal failure.  Sodium to 125, potassium 2.9, and creatinine 2.05.  Patient received IV antiemetics and IV fluids was brought in for overnight observation.    Review of records shows the patient has had multiple episodes of acute kidney injury, dehydration, and hospitalizations for nausea and vomiting related to cannabis hyperemesis dating back to 2018.  Patient had an extensive work-up at [facility] 12/2018 that did not reveal a clear etiology for her abdominal pain, nausea, and vomiting including gastric emptying study, EGD with biopsy, and colonoscopy.  Patient's nausea and vomiting have been refractory to multiple antiemetics including scheduled metoclopramide, scopolamine, Zofran, and Cyproheptadine.  Patient has been seen by [facility] where working diagnosis has remained gastroparesis and cannabinoid hyperemesis.  Unfortunately, patient's care remains somewhat fractured not just because of difficulty establishing consistent care within one specialty but also because of multiple admissions within different systems.      Baseline renal function appears to have been relatively normal (creatinine 0.8-0.9) up until to Summer of 2020 when it began to climb.  She would experience DEREK during episodes of N/V/dehdyration with full recovery of renal function.  Unfortunately, trend beginning June 202 showed slower and then incomplete recovery of renal function after  episodes of DEREK.  By December 2020, baseline Cr appears to be 1.3 and by late winter/early spring, was up to 1.6.  Upon admission, Cr 2.05, now 1.6 after fluid challenge.  Etiology for CKD is likely DM2, HTN, and frequent kidney injury.  While imaging does not show any obstructive uropathy, tumor, cysts, or infarct, it does show some scarring of the right kidney.  Additionally, for the past year or so, patient has been noted to have at least 1+ proteinuria and has had an elevated serum protein.  Patient saw Nephrology back in 3/13/2019 for investigation of recurring hypokalemia, however, at that time her renal function was normal.  Urine studies at that time did show urine sodium 119, urine osm 865, and urine potassium 152 with BMP performed the next day (3/14/2019) showing a sodium 139, potassium 3.1, chloride 100, bicarb 26, BUN 8, and creatinine 0.89.  In March 2021, random microalbumin 18.6, random urine creatinine 194, random urine microalbumin/creatinine ratio of 10.      Discussed with patient my concerns for evolving CKD, particularly as her mother required dialysis.  Etiology remains somewhat unclear.  Her A1c is relatively well controlled at 6.2.  She has no formal diagnosis of hypertension.  Her BMI is within normal range.  She not take any nephrotoxic medications including ibuprofen or Aleve.  Consideration given to possibility of frequent renal injury now causing chronic renal impairment or even potentially an underlying, previously undiagnosed rheumatologic issue such as lupus as the source.  Recommend outpatient nephrology follow-up.  Patient is on board.  Referral has been placed.  Additionally, attempting to add on urine sodium, urine osm, serum osm, SPEP, UPEP, renin activity, aldosterone, uric acid.  Will defer 24-hour urine collection and renal ultrasound to Nephrology.    With regard to nausea and vomiting, discussed my concern that this may be related to cannabis hyperemesis.  Patient  acknowledges that she still uses cannabis, however, relatively infrequently.  Recommended abstaining completely from cannabis and that if she was using it for anxiety or sleep, she could try CBD Gummies or medications as these do not contain THC.  Patient is traveling for 10 days beginning next week for her birthday and is concerned about having another flare of nausea/vomiting/abdominal pain.  Discussed need for her to stay very well-hydrated with electrolytes and water.  Recommended she use a Camelbak or other hydration device when hiking in [place].  Also asked that she  electrolyte tablets or electrolyte snacks such as the electrolyte jellybeans to replace ongoing sodium and potassium losses when in the sun.  A prescription for Compazine has been provided as was a paper copy of Zofran should she need extra for her trip (she has some at home).  Additionally, discussed that with cannabis hyperemesis, these medications are not typically as effective and so she was given a limited quantity of Zyprexa to trial for refractory nausea.  Should she develop a flare of her nausea and vomiting while traveling, she has been asked to go to the ER urgent care for evaluation as she may need fluids and electrolyte replacement.  Patient voiced understanding.    KRISTEN Mcclure    Code Status   Full Code       Primary Care Physician   [facility]    Consultations This Hospital Stay   None    Time Spent on this Encounter   I, KRISTEN Mcclure, personally saw the patient today and spent greater than 30 minutes discharging this patient.    Allergies   Allergies   Allergen Reactions     Metformin Diarrhea       Physical Exam   Temp: 98.1  F (36.7  C) Temp src: Oral BP: (!) 125/90 Pulse: 104   Resp: 18 SpO2: 99 % O2 Device: None (Room air)    Vitals:    06/11/21 2133   Weight: 67.4 kg (148 lb 8 oz)     Vital Signs with Ranges  Temp:  [97.6  F (36.4  C)-98.7  F (37.1  C)] 98.1  F (36.7   C)  Pulse:  [] 104  Resp:  [15-20] 18  BP: (117-167)/() 125/90  SpO2:  [96 %-100 %] 99 %  I/O last 3 completed shifts:  In: 820 [I.V.:820]  Out: -       GENERAL:  Delightful, calm, cooperative, alert.  Well developed, well nourished.     HEENT: Normocephalic, atraumatic.  Extra occular mm intact.  Sclera clear. PERRL.  Mucous membranes moist.     PULMONOLOGY: unlabored  CARDIAC: Regular   ABDOMEN: Soft, nontende   MUSCULOSKELETAL:  Moving x 4 spontaneously with CMS intact x4.  Normal bulk and tone.  No LE edema.     NEURO: Alert and oriented x3.  CN II-XII grossly intact and symmetric.  No ataxia or tremor.  Nonfocal.  SKIN: Warm, dry.     Discharge Medications   Current Discharge Medication List      START taking these medications    Details   OLANZapine (ZYPREXA) 5 MG tablet Take 0.5-1 tablets (2.5-5 mg) by mouth every 8 hours as needed (nausea not controlled by other medications)  Qty: 15 tablet, Refills: 0    Associated Diagnoses: Acute kidney injury (H); Non-intractable vomiting with nausea, unspecified vomiting type      ondansetron (ZOFRAN) 8 MG tablet Take 0.5-1 tablets (4-8 mg) by mouth every 8 hours as needed for nausea (may cause headache or constipation)  Qty: 30 tablet, Refills: 0    Associated Diagnoses: Acute kidney injury (H); Non-intractable vomiting with nausea, unspecified vomiting type      prochlorperazine (COMPAZINE) 10 MG tablet Take 1 tablet (10 mg) by mouth every 6 hours as needed for nausea or vomiting  Qty: 45 tablet, Refills: 1    Associated Diagnoses: Acute kidney injury (H); Non-intractable vomiting with nausea, unspecified vomiting type         CONTINUE these medications which have NOT CHANGED    Details   latanoprost (XALATAN) 0.005 % ophthalmic solution Place 1 drop into both eyes At Bedtime      nortriptyline (PAMELOR) 10 MG capsule Take 10 mg by mouth See Admin Instructions Titrate up to 4 capsules once daily in the evening. Start with 1 cap daily for 7 days, then  increase to 2 caps daily for 7 days, then 3 caps daily for 7 days, then 4 caps daily thereafter.             Data   Most Recent 3 CBC's:  Recent Labs   Lab Test 06/12/21  0628 06/11/21  1513 05/14/21  0653   WBC 5.7 7.6 7.4   HGB 11.2* 12.1 10.6*   MCV 82 79 85    464* 358      Most Recent 3 BMP's:  Recent Labs   Lab Test 06/12/21  0628 06/11/21  1513 05/14/21  1155 05/14/21  0653   * 125*  --  133   POTASSIUM 3.4 2.9* 3.6 3.3*   CHLORIDE 100 90*  --  98   CO2 25 25  --  32   BUN 40* 61*  --  57*   CR 1.64* 2.05*  --  2.35*   ANIONGAP 5 10  --  3   DIMITRI 8.7 9.5  --  8.4*   * 119*  --  127*     Most Recent 2 LFT's:  Recent Labs   Lab Test 06/11/21  1513 05/13/21  1451   AST 15 21   ALT 24 25   ALKPHOS 107 115   BILITOTAL 0.6 0.6     Most Recent INR's and Anticoagulation Dosing History:  Anticoagulation Dose History     There is no flowsheet data to display.        Most Recent 3 Troponin's:  Recent Labs   Lab Test 05/13/21  1451   TROPI <0.015     Most Recent Cholesterol Panel:No lab results found.  Most Recent 6 Bacteria Isolates From Any Culture (See EPIC Reports for Culture Details):No lab results found.  Most Recent TSH, T4 and A1c Labs:  Recent Labs   Lab Test 05/13/21  1451   TSH 0.93   A1C 6.2*     Results for orders placed or performed during the hospital encounter of 05/13/21   CT Abdomen Pelvis w/o Contrast    Narrative    EXAM: CT ABDOMEN PELVIS W/O CONTRAST  LOCATION: [facility]  DATE/TIME: 5/13/2021 7:37 PM    INDICATION: Abdominal pain, acute, nonlocalized  COMPARISON: 9/11/2018  TECHNIQUE: CT scan of the abdomen and pelvis was performed without IV contrast. Multiplanar reformats were obtained. Dose reduction techniques were used.  CONTRAST: None.    FINDINGS:   LOWER CHEST: Normal.    HEPATOBILIARY: Normal.    PANCREAS: Normal.    SPLEEN: Normal.    ADRENAL GLANDS: Normal.    KIDNEYS/BLADDER: Normal.    BOWEL: No obstruction or inflammatory change. Appendix  normal.    LYMPH NODES: Normal.    VASCULATURE: Unremarkable.    PELVIC ORGANS: Vaginal collection device, surrounds cervix. No adnexal lesions. Small calcified uterine fibroids.    MUSCULOSKELETAL: No suspicious bony lesions. Significant interval weight loss.      Impression    IMPRESSION:   1.  No etiology for symptoms evident. Nothing obstructive or inflammatory.           Discharge Orders      Nephrology Adult Referral      Reason for your hospital stay    You were brought in for nausea, vomiting, and abdominal pain.  Your electrolytes were abnormal with low sodium and potassium but your work-up was otherwise quite encouraging - no signs of infection, kidney injury, or other acute process causing your symptoms.  You received IV fluids and your electrolytes continue to improve.  Make sure you stay well hydrated with lots of fluids.  When drinking fluids, please make sure you aren't just drinking water.  If possible, switch it up and drink things like juice, lemonade, milk, Vitamin Water, Gatorade, or any other fluids that have electrolytes in them.  Sometimes when you drink just water, it can dilute out your sodium and make you feel worse.  If you are using THC or marijuana, please know it cause certain people to develop severe, horrible abdominal pain and nausea.  We have no good way of predicting who will develop these symptoms and don't have a great way to treat them once they develop.  If you find your symptoms are related to TCH or marijuana use, it is very important you stop using THC immediately.  And it can take 2-4 weeks for it all to get out of your system so if you have symptoms, you might be miserable for a while before things get better.  Follow closely with your primary care provider.  If symptoms persist, you may benefit from a referral to the Gastroenterology team.     Follow-up and recommended labs and tests     Follow-up with your primary care provider in 3-5 days or sooner if needed.  You  should have a BMP lab draw before your appointment.     When to contact your care team    Call your primary doctor if you have any of the following: temperature greater than 101, worsening shortness of breath, increased swelling, worsening pain, new or unrelenting diarrhea, dizziness/passing out, falls, bleeding that doesn't stop, uncontrolled pain, loss of taste, loss of smell, or any other new or concerning symptoms.  Call 911 or go to the Emergency Room if you need immediate assistance.     Diet    Advance back to your normal diet as tolerated.

## 2021-06-12 NOTE — PLAN OF CARE
Patient's After Visit Summary was reviewed with patient.   Patient verbalized understanding of After Visit Summary, recommended follow up and was given an opportunity to ask questions.   Discharge medications sent home with patient/family: YES   Discharged with significant other  VSS. Declined wheelchair ride. discharge medications (Zyprexa and Compazine) and script for Zofran given to pt.

## 2021-06-12 NOTE — DISCHARGE INSTRUCTIONS
You have a kidney injury, likely because of your increased vomiting. Please keep up with your fluid intake and see your primary care doctor early next week to check on your kidneys and your electrolytes. Please come in to be seen more urgently if you are unable to keep down fluids.    104

## 2021-06-12 NOTE — PLAN OF CARE
PRIMARY DIAGNOSIS: ACUTE ABDOMINAL PAIN/ NAUSEA/ VOMITING   OUTPATIENT/OBSERVATION GOALS TO BE MET BEFORE DISCHARGE:  1. Pain Status: Pain free.    2. Return to near baseline physical activity: Yes    3. Cleared for discharge by consultants (if involved): N/A    Discharge Planner Nurse   Safe discharge environment identified: Yes  Barriers to discharge: Yes       Entered by: RENNY HALL 06/12/2021   Vital signs:  Temp: 98.6  F (37  C) Temp src: Oral BP: 117/82 Pulse: 89   Resp: 15 SpO2: 99 % O2 Device: None (Room air) Alert and oriented x4. Denies pain, nausea, vomiting this morning. Had light regular breakfast. Tolerated well. IVF running @100mL/hr. Miralax given this morning for constipation.  and Kcl 3.4 this morning. Up independently. Possible discharge this afternoon if continue tolerating diet. Will continue to monitor.       Please review provider order for any additional goals.   Nurse to notify provider when observation goals have been met and patient is ready for discharge.

## 2021-06-12 NOTE — PHARMACY-ADMISSION MEDICATION HISTORY
Admission medication history interview status for this patient is complete. See [facility] admission navigator for allergy information, prior to admission medications and immunization status.     Medication history interview done, indicate source(s): Patient  Medication history resources (including written lists, pill bottles, clinic record):None  Pharmacy: -    Changes made to PTA medication list:  Added: latanoprost  Deleted: lantus, mvi, effexor  Changed: -    Actions taken by pharmacist (provider contacted, etc):None     Additional medication history information:None    Medication reconciliation/reorder completed by provider prior to medication history?  no   (Y/N)     For patients on insulin therapy:   Do you use sliding scale insulin based on blood sugars?   What is your pre-meal insulin coverage?    Do you typically eat three meals a day?   How many times do you check your blood glucose per day?   How many episodes of hypoglycemia do you typically have per month?   Do you have a Continuous Glucose Monitor (CGM)?      Prior to Admission medications    Medication Sig Last Dose Taking? Auth Provider   latanoprost (XALATAN) 0.005 % ophthalmic solution Place 1 drop into both eyes At Bedtime Past Week at Unknown time Yes Unknown, Entered By History   nortriptyline (PAMELOR) 10 MG capsule Take 10 mg by mouth See Admin Instructions Titrate up to 4 capsules once daily in the evening. Start with 1 cap daily for 7 days, then increase to 2 caps daily for 7 days, then 3 caps daily for 7 days, then 4 caps daily thereafter. Past Week at Unknown time Yes Reported, Patient

## 2021-06-12 NOTE — H&P
History and Physical     Kym Lemus MRN# [id number]   YOB: 1990 Age: 30 year old      Date of Admission:  6/11/2021    Primary care provider: [place], [facility]          Assessment and Plan:   Kym Lemus is a 30 year old female with a PMH significant for DM II diet controlled, marijuana use, previous admissions for cyclic vomiting, chronic abdominal pain, HTN  and suspicion for glaucoma who presents with 1 week history of abdominal pain, nausea and vomiting.    Patient was discussed with Dr. Sheppard, who was provider in ED. Chart review of ED work up was reviewed as well as chart review of Care Everywhere, previous visits and admissions.     #Severe intermittent left upper quadrant pain with intractable nausea/vomiting  #Suspect some component of cyclic vomiting syndrome related to marijuana use  Patient has had 1 week of intermittent severe left upper quadrant abdominal pain with intractable nausea and vomiting with poor oral intake.  She is able to keep down fluids but has not eaten any food.  She has not had a bowel movement for 6 days but denies diarrhea, fever and chills.  Her pain is better now that she is in the emergency room.  On admission she is afebrile with heart rate 116, pressure 167/129 and breathing comfortably on room air without hypoxia.  Lab work is remarkable for negative pregnancy test, sodium 125, potassium 2.9, creatinine 2.05, BUN 61, magnesium 2.7, anion gap 10, normal LFTs, lipase 119, glucose 119, WBC 7.6.  Abdominal exam was benign so no imaging was done of her abdomen.  UA is negative for infection and Covid test is negative.   -Advance diet as tolerated  -We will give MiraLAX and senna to encourage a bowel movement  -We will have Zofran and Compazine available  -Encourage hot showers  -We will have oxycodone and Dilaudid available for severe pain  -Counseled patient on quitting marijuana use    #Acute kidney injury  #Suspect underlying  chronic kidney disease given proteinuria and elevated creatinines in the past  Chart review patient's baseline creatinine appears to be 1.5-1.6 which is quite elevated for her age. Currently creatinine is 2.05.  She has a history of type 2 diabetes and likely untreated hypertension which may be causing this elevation.  I note that she has had protein in her urine on her last 2 visits.  I think it would be important for her to see a nephrologist which she has requested an outpatient referral for.  -Maintenance fluids overnight  -Avoid nephrotoxins  -Repeat in a.m.  -Recommend counseling patient prior to discharge on avoiding NSAIDs  -Recommend referral to nephrologist at discharge  -Consider discharging on ACE inhibitor    #Hyponatremia and hypokalemia  Sodium 125 and potassium 2.9.  Suspect due to poor oral intake and profuse vomiting.   -Normal saline at 100 mL/h  -Patient already received potassium 50 mEq in the ED.  -Potassium 40 mEq oral  -Repeat labs in a.m.    #Hypertension  Patient's blood pressure is quite elevated here.  She states it is always high when she is comes into the hospital but she has not checked it at home.  Given her kidney function I suspect that she has underlying hypertension.  -Recommend ACE inhibitor at discharge    #Type 2 diabetes  And follows with [facility].  Her last A1c was 6.2 and she states she has been off insulin for at least a year.  Her glucose in the emergency room is 119.                  Social: No concerns  Code: Discussed with patient and they have chosen full code  VTE prophylaxis: PCDs  Disposition: Observation                    Chief Complaint:   Abdominal pain, nausea and vomiting         History of Present Illness:   Kym Lemus is a 30 year old female who presents with intermittent left upper quadrant abdominal pain along with intractable nausea and vomiting for 1 week.  The pain is similar to previous emergency room visits for  abdominal pain.  She is able to drink fluid but not able to keep any food down.  She denies diarrhea and has not had a bowel movement for 6 days.  She denies chest discomfort, shortness of breath, cough and urinary symptoms.  She continues to use edible marijuana in order to help her sleep and to control her nausea at home.  She was directed by her primary care doctor to stop insulin approximately 1 year ago she states.  She is seeing a pain clinic for chronic abdominal pain and is scheduled to see a physical therapist for this.  She does not drink alcohol or smoke cigarettes.             Past Medical History:     Past Medical History:   Diagnosis Date     Cannabinoid hyperemesis syndrome      Diabetes mellitus type 2, diet-controlled (H)                Past Surgical History:   History reviewed. No pertinent surgical history.            Social History:     Social History     Socioeconomic History     Marital status:      Spouse name: Not on file     Number of children: Not on file     Years of education: Not on file     Highest education level: Not on file   Occupational History     Not on file   Social Needs     Financial resource strain: Not on file     Food insecurity     Worry: Not on file     Inability: Not on file     Transportation needs     Medical: Not on file     Non-medical: Not on file   Tobacco Use     Smoking status: Never Smoker     Smokeless tobacco: Never Used   Substance and Sexual Activity     Alcohol use: Not Currently     Drug use: Yes     Types: Marijuana     Sexual activity: Not on file   Lifestyle     Physical activity     Days per week: Not on file     Minutes per session: Not on file     Stress: Not on file   Relationships     Social connections     Talks on phone: Not on file     Gets together: Not on file     Attends Denominational service: Not on file     Active member of club or organization: Not on file     Attends meetings of clubs or organizations: Not on file     Relationship  status: Not on file     Intimate partner violence     Fear of current or ex partner: Not on file     Emotionally abused: Not on file     Physically abused: Not on file     Forced sexual activity: Not on file   Other Topics Concern     Not on file   Social History Narrative     Not on file               Family History:   Family history reviewed and is non contributory         Allergies:      Allergies   Allergen Reactions     Metformin Diarrhea               Medications:     Prior to Admission medications    Medication Sig Last Dose Taking? Auth Provider   insulin glargine (LANTUS VIAL) 100 UNIT/ML vial Inject 15 Units Subcutaneous every morning   Reported, Patient   multivitamin w/minerals (THERA-VIT-M) tablet Take 1 tablet by mouth daily   Reported, Patient   nortriptyline (PAMELOR) 10 MG capsule Take 10 mg by mouth See Admin Instructions Titrate up to 4 capsules once daily in the evening. Start with 1 cap daily for 7 days, then increase to 2 caps daily for 7 days, then 3 caps daily for 7 days, then 4 caps daily thereafter.   Reported, Patient   venlafaxine (EFFEXOR-XR) 150 MG 24 hr capsule Take 150 mg by mouth daily   Reported, Patient              Review of Systems:   A Comprehensive greater than 10 system review of systems was carried out.  Pertinent positives and negatives are noted above.  Otherwise negative for contributory information.            Physical Exam:   Blood pressure (!) 163/116, pulse 113, temperature 97.6  F (36.4  C), temperature source Temporal, resp. rate 18, SpO2 100 %.  Exam:  GENERAL:  Comfortable, sleepy  PSYCH: pleasant, oriented, No acute distress.  HEENT:  PERRLA. Normal conjunctiva, normal hearing, nasal mucosa and Oropharynx are normal.  NECK:  Supple, no neck vein distention, adenopathy or bruits, normal thyroid.  HEART:  Normal S1, S2 with no murmur, no pericardial rub, gallops or S3 or S4.  LUNGS:  Clear to auscultation, normal Respiratory effort. No wheezing, rales or  lidya.  ABDOMEN:  Soft, no hepatosplenomegaly, normal bowel sounds. Non-tender, non distended.   EXTREMITIES:  No pedal edema, +2 pulses bilateral and equal.  SKIN:  Dry to touch, No rash, wound or ulcerations.  NEUROLOGIC:  CN 2-12 grossly intact,  sensation is intact with no focal deficits.               Data:     Recent Labs   Lab 06/11/21  1513   WBC 7.6   HGB 12.1   HCT 33.5*   MCV 79   *     Recent Labs   Lab 06/11/21  1513   *   POTASSIUM 2.9*   CHLORIDE 90*   CO2 25   ANIONGAP 10   *   BUN 61*   CR 2.05*   GFRESTIMATED 31*   GFRESTBLACK 36*   DIMITRI 9.5   MAG 2.7*   PROTTOTAL 8.9*   ALBUMIN 4.5   BILITOTAL 0.6   ALKPHOS 107   AST 15   ALT 24     Recent Labs   Lab 06/11/21  1752   COLOR Light Yellow   APPEARANCE Clear   URINEGLC Negative   URINEBILI Negative   URINEKETONE Negative   SG 1.012   UBLD Trace*   URINEPH 5.0   PROTEIN 10*   NITRITE Negative   LEUKEST Negative   RBCU 1   WBCU 1         No results found for this or any previous visit (from the past 24 hour(s)).      Lexi Hebert PA-C

## 2021-06-14 LAB
ALBUMIN MFR UR ELPH: 39.2 %
ALBUMIN SERPL ELPH-MCNC: 3.9 G/DL (ref 3.7–5.1)
ALPHA1 GLOB MFR UR ELPH: 15.9 %
ALPHA1 GLOB SERPL ELPH-MCNC: 0.3 G/DL (ref 0.2–0.4)
ALPHA2 GLOB MFR UR ELPH: 16.7 %
ALPHA2 GLOB SERPL ELPH-MCNC: 0.6 G/DL (ref 0.5–0.9)
B-GLOBULIN MFR UR ELPH: 8.5 %
B-GLOBULIN SERPL ELPH-MCNC: 0.9 G/DL (ref 0.6–1)
GAMMA GLOB MFR UR ELPH: 19.7 %
GAMMA GLOB SERPL ELPH-MCNC: 0.9 G/DL (ref 0.7–1.6)
INTERPRETATION ECG - MUSE: NORMAL
M PROTEIN MFR UR ELPH: 0 %
M PROTEIN SERPL ELPH-MCNC: 0 G/DL
PROT PATTERN SERPL ELPH-IMP: NORMAL
PROT PATTERN UR ELPH-IMP: ABNORMAL

## 2021-06-15 LAB — ALDOST SERPL-MCNC: 36.8 NG/DL (ref 0–31)

## 2021-06-20 LAB — RENIN PLAS-CCNC: 6.2 NG/ML/HR

## 2021-06-21 LAB — ALDOSTERONE RENIN RATIO: 5.9 (ref 0–25)

## 2021-07-15 ENCOUNTER — HOSPITAL ENCOUNTER (OUTPATIENT)
Facility: CLINIC | Age: 31
Setting detail: OBSERVATION
Discharge: HOME | End: 2021-07-17
Attending: EMERGENCY MEDICINE | Admitting: INTERNAL MEDICINE
Payer: COMMERCIAL

## 2021-07-15 DIAGNOSIS — K21.00 GASTROESOPHAGEAL REFLUX DISEASE WITH ESOPHAGITIS WITHOUT HEMORRHAGE: ICD-10-CM

## 2021-07-15 DIAGNOSIS — N17.9 AKI (ACUTE KIDNEY INJURY) (H): ICD-10-CM

## 2021-07-15 DIAGNOSIS — R11.10 VOMITING, INTRACTABILITY OF VOMITING NOT SPECIFIED, PRESENCE OF NAUSEA NOT SPECIFIED, UNSPECIFIED VOMITING TYPE: ICD-10-CM

## 2021-07-15 DIAGNOSIS — I10 ESSENTIAL HYPERTENSION: Primary | ICD-10-CM

## 2021-07-15 DIAGNOSIS — R10.84 ABDOMINAL PAIN, GENERALIZED: ICD-10-CM

## 2021-07-15 LAB
ALBUMIN SERPL-MCNC: 5.1 G/DL (ref 3.4–5)
ALBUMIN UR-MCNC: 20 MG/DL
ALP SERPL-CCNC: 111 U/L (ref 40–150)
ALT SERPL W P-5'-P-CCNC: 30 U/L (ref 0–50)
ANION GAP SERPL CALCULATED.3IONS-SCNC: 12 MMOL/L (ref 3–14)
APPEARANCE UR: CLEAR
AST SERPL W P-5'-P-CCNC: 33 U/L (ref 0–45)
B-HCG FREE SERPL-ACNC: <5 IU/L (ref 0–5)
BASOPHILS # BLD AUTO: 0 10E3/UL (ref 0–0.2)
BASOPHILS NFR BLD AUTO: 0 %
BILIRUB SERPL-MCNC: 0.8 MG/DL (ref 0.2–1.3)
BILIRUB UR QL STRIP: NEGATIVE
BUN SERPL-MCNC: 49 MG/DL (ref 7–30)
CALCIUM SERPL-MCNC: 10.2 MG/DL (ref 8.5–10.1)
CHLORIDE BLD-SCNC: 94 MMOL/L (ref 94–109)
CO2 SERPL-SCNC: 25 MMOL/L (ref 20–32)
COLOR UR AUTO: ABNORMAL
CREAT SERPL-MCNC: 2.9 MG/DL (ref 0.52–1.04)
EOSINOPHIL # BLD AUTO: 0 10E3/UL (ref 0–0.7)
EOSINOPHIL NFR BLD AUTO: 0 %
ERYTHROCYTE [DISTWIDTH] IN BLOOD BY AUTOMATED COUNT: 11.9 % (ref 10–15)
GFR SERPL CREATININE-BSD FRML MDRD: 20 ML/MIN/1.73M2
GLUCOSE BLD-MCNC: 155 MG/DL (ref 70–99)
GLUCOSE UR STRIP-MCNC: NEGATIVE MG/DL
HCT VFR BLD AUTO: 40.3 % (ref 35–47)
HGB BLD-MCNC: 13.4 G/DL (ref 11.7–15.7)
HGB UR QL STRIP: NEGATIVE
HOLD SPECIMEN: NORMAL
IMM GRANULOCYTES # BLD: 0 10E3/UL
IMM GRANULOCYTES NFR BLD: 0 %
KETONES UR STRIP-MCNC: NEGATIVE MG/DL
LEUKOCYTE ESTERASE UR QL STRIP: NEGATIVE
LIPASE SERPL-CCNC: 96 U/L (ref 73–393)
LYMPHOCYTES # BLD AUTO: 2.7 10E3/UL (ref 0.8–5.3)
LYMPHOCYTES NFR BLD AUTO: 24 %
MCH RBC QN AUTO: 27.9 PG (ref 26.5–33)
MCHC RBC AUTO-ENTMCNC: 33.3 G/DL (ref 31.5–36.5)
MCV RBC AUTO: 84 FL (ref 78–100)
MONOCYTES # BLD AUTO: 1.3 10E3/UL (ref 0–1.3)
MONOCYTES NFR BLD AUTO: 12 %
MUCOUS THREADS #/AREA URNS LPF: PRESENT /LPF
NEUTROPHILS # BLD AUTO: 7.2 10E3/UL (ref 1.6–8.3)
NEUTROPHILS NFR BLD AUTO: 64 %
NITRATE UR QL: NEGATIVE
NRBC # BLD AUTO: 0 10E3/UL
NRBC BLD AUTO-RTO: 0 /100
PH UR STRIP: 5.5 [PH] (ref 5–7)
PLATELET # BLD AUTO: 474 10E3/UL (ref 150–450)
POTASSIUM BLD-SCNC: 3.8 MMOL/L (ref 3.4–5.3)
PROT SERPL-MCNC: 9.9 G/DL (ref 6.8–8.8)
RBC # BLD AUTO: 4.8 10E6/UL (ref 3.8–5.2)
RBC URINE: 1 /HPF
SARS-COV-2 RNA RESP QL NAA+PROBE: NEGATIVE
SODIUM SERPL-SCNC: 131 MMOL/L (ref 133–144)
SP GR UR STRIP: 1.01 (ref 1–1.03)
SQUAMOUS EPITHELIAL: 3 /HPF
UROBILINOGEN UR STRIP-MCNC: NORMAL MG/DL
WBC # BLD AUTO: 11.3 10E3/UL (ref 4–11)
WBC URINE: 1 /HPF

## 2021-07-15 PROCEDURE — 99220 PR INITIAL OBSERVATION CARE,LEVEL III: CPT | Performed by: INTERNAL MEDICINE

## 2021-07-15 PROCEDURE — 99285 EMERGENCY DEPT VISIT HI MDM: CPT | Mod: 25

## 2021-07-15 PROCEDURE — 250N000011 HC RX IP 250 OP 636: Performed by: EMERGENCY MEDICINE

## 2021-07-15 PROCEDURE — 80053 COMPREHEN METABOLIC PANEL: CPT | Performed by: EMERGENCY MEDICINE

## 2021-07-15 PROCEDURE — 87635 SARS-COV-2 COVID-19 AMP PRB: CPT | Performed by: EMERGENCY MEDICINE

## 2021-07-15 PROCEDURE — 85025 COMPLETE CBC W/AUTO DIFF WBC: CPT | Performed by: EMERGENCY MEDICINE

## 2021-07-15 PROCEDURE — 84702 CHORIONIC GONADOTROPIN TEST: CPT

## 2021-07-15 PROCEDURE — G0378 HOSPITAL OBSERVATION PER HR: HCPCS

## 2021-07-15 PROCEDURE — 81001 URINALYSIS AUTO W/SCOPE: CPT | Performed by: EMERGENCY MEDICINE

## 2021-07-15 PROCEDURE — 36415 COLL VENOUS BLD VENIPUNCTURE: CPT | Performed by: EMERGENCY MEDICINE

## 2021-07-15 PROCEDURE — 96374 THER/PROPH/DIAG INJ IV PUSH: CPT

## 2021-07-15 PROCEDURE — 36592 COLLECT BLOOD FROM PICC: CPT | Performed by: EMERGENCY MEDICINE

## 2021-07-15 PROCEDURE — 258N000003 HC RX IP 258 OP 636: Performed by: EMERGENCY MEDICINE

## 2021-07-15 PROCEDURE — 93005 ELECTROCARDIOGRAM TRACING: CPT

## 2021-07-15 PROCEDURE — 83690 ASSAY OF LIPASE: CPT | Performed by: EMERGENCY MEDICINE

## 2021-07-15 PROCEDURE — 96361 HYDRATE IV INFUSION ADD-ON: CPT

## 2021-07-15 PROCEDURE — C9803 HOPD COVID-19 SPEC COLLECT: HCPCS

## 2021-07-15 RX ORDER — SODIUM CHLORIDE 9 MG/ML
INJECTION, SOLUTION INTRAVENOUS CONTINUOUS
Status: DISCONTINUED | OUTPATIENT
Start: 2021-07-15 | End: 2021-07-16

## 2021-07-15 RX ORDER — HALOPERIDOL 5 MG/ML
2.5 INJECTION INTRAMUSCULAR ONCE
Status: COMPLETED | OUTPATIENT
Start: 2021-07-15 | End: 2021-07-15

## 2021-07-15 RX ADMIN — SODIUM CHLORIDE 1000 ML: 9 INJECTION, SOLUTION INTRAVENOUS at 20:20

## 2021-07-15 RX ADMIN — HALOPERIDOL LACTATE 2.5 MG: 5 INJECTION, SOLUTION INTRAMUSCULAR at 18:55

## 2021-07-15 RX ADMIN — SODIUM CHLORIDE 1000 ML: 9 INJECTION, SOLUTION INTRAVENOUS at 18:56

## 2021-07-15 ASSESSMENT — ENCOUNTER SYMPTOMS
FEVER: 0
BACK PAIN: 1
VOMITING: 1
SHORTNESS OF BREATH: 0
NAUSEA: 1
DIFFICULTY URINATING: 1
ABDOMINAL PAIN: 1
COUGH: 0

## 2021-07-15 ASSESSMENT — MIFFLIN-ST. JEOR: SCORE: 1423.47

## 2021-07-15 NOTE — ED TRIAGE NOTES
Patient presents with abdominal pain and bilateral back pain. Started feeling sick on Monday with nausea and vomiting. The pain has gotten more severe prompting her to come in. Patient states last time she was seen here she was told she had kidney failure, she has a nephrology appointment scheduled. Reports it is difficult to urinate. Took tylenol around 1315.

## 2021-07-15 NOTE — ED PROVIDER NOTES
"  History   Chief Complaint:  Abdominal Pain       The history is provided by the patient.      Kym Lemus is a 31 year old female with history of type II diabetes, kidney failure and cannabinoid hyperemesis syndrome who presents with lower left sided abdominal pain and bilateral back pain for the past three to four days. She notes that she had similar pain in last month when she was diagnosed with acute kidney failure. She was scheduled a nephrologist appointment at that time. She notes of nausea and vomiting during this time, with last vomiting episode two days ago. She notes of dark stool during this time as well as difficulty urinating. She denies cough, fever, shortness of breath, chest pain, alcohol use or ibuprofen use. The patient treated with Tylenol around five hours ago.    Imaging Obtained on 6/25:  CT Abdomen/Pelvis with IV contrast:   1.  No acute infectious/inflammatory intra-abdominal or pelvic process   2.  Findings which may be on the basis of bilateral medullary nephrocalcinosis   3.  Large fecal debris     Review of Systems   Constitutional: Negative for fever.   Respiratory: Negative for cough and shortness of breath.    Cardiovascular: Negative for chest pain.   Gastrointestinal: Positive for abdominal pain, nausea and vomiting.   Genitourinary: Positive for difficulty urinating.   Musculoskeletal: Positive for back pain.   All other systems reviewed and are negative.    Allergies:  Metformin    Medications:  Pamelor  Zyprexa  Compazine    Past Medical History:    Cannabinoid hyperemesis syndrome  Diabetes mellitus type II  Hypokalemia  Hyponatremia  Acute kidney injury    Social History:  The patient presents to the emergency department alone.    Physical Exam     Patient Vitals for the past 24 hrs:   BP Temp Temp src Pulse Resp SpO2 Height Weight   07/15/21 2152 106/66 97.7  F (36.5  C) Axillary 112 18 100 % 1.651 m (5' 5\") 71.8 kg (158 lb 3.2 oz)   07/15/21 2115 (!) 159/115 -- -- 117 " 22 99 % -- --   07/15/21 2100 (!) 181/118 -- -- 117 8 100 % -- --   07/15/21 2045 (!) 181/121 -- -- 118 11 100 % -- --   07/15/21 2030 (!) 176/123 -- -- 117 (!) 0 100 % -- --   07/15/21 2015 (!) 180/120 -- -- 118 (!) 36 100 % -- --   07/15/21 2000 (!) 164/112 -- -- (!) 126 (!) 32 100 % -- --   07/15/21 1945 (!) 187/123 -- -- 116 22 100 % -- --   07/15/21 1930 (!) 192/125 -- -- 113 24 100 % -- --   07/15/21 1821 (!) 166/122 98  F (36.7  C) Oral (!) 130 16 100 % -- --       Physical Exam  VS: Reviewed per above  HENT: normal speech  EYES: sclera anicteric  CV: Rate as noted, regular rhythm.   RESP: Effort normal. Breath sounds are normal bilaterally.  GI: no reproducible tenderness, rebound/guarding, not distended.  NEURO: Alert, moving all extremities  MSK: No deformity of the extremities  SKIN: Warm and dry    Emergency Department Course   ECG  ECG taken at , ECG read at   Sinus tachycardia.  T wave abnormality, consider lateral ischemia.  Abnormal ECG.  No significant change as compared to prior EKG dated 21   Rate 115 bpm. AZ interval 154 ms. QRS duration 78 ms. QT/QTc 330/456 ms. P-R-T axis 65 70 37.      Laboratory:   CBC: WBC: 11.3 (high), HB.4, PLT: 474 (high)    CMP: Sodium: 131 (L), Urea Nitrogen: 49 (H), Glucose: 155 (H), Creatinine: 2.90 (H), GFR: 20 (L), Calcium: 10.2 (H), Albumin: 5.1 (H), Protein Total: 9.9 (H), o/w WNL     UA with Microscopic: Pending    Lipase: 96    iStat HCG quantitative pregnancy POCT: <5.0    Asymptomatic COVID-19 Virus (Coronavirus) by PCR Nasopharyngeal: Negative    Emergency Department Course:    Reviewed:  I reviewed nursing notes, vitals, past medical history and care everywhere    Assessments:   I obtained history and examined the patient as noted above.    I rechecked the patient.    I rechecked the patient and explained findings. I recommended admission and the patient consented.     Consults:    I consulted with Dr. Leija, hospitalist,  regarding the patient's history and presentation here in the emergency department who accepted the patient for admission.     Interventions:  1855 Haldol 2.5mg IV  1856 NS 1L IV   2020 NS 1L IV    Disposition:  The patient was admitted to the hospital under the care of Dr. Leija.     Impression & Plan       Medical Decision Making:  Patient presents to the ER for evaluation of vomiting, generalized abdominal pain and low back pain.  On arrival vital signs notable for tachycardia, hypertension.  On arrival there is no reproducible abdominal tenderness despite abdominal pain.  Per chart review, patient has frequent hospital admissions for abdominal pain and vomiting of unclear etiology.  She had reassuring CT of the abdomen just a few weeks ago.  Labs today show evidence of mild leukocytosis of 11.3, creatinine elevation of 2.9, negative pregnancy testing, no evidence of transaminitis or pancreatitis.  Patient was given IV fluids and small dose of IV Haldol with improvement in symptoms.  She was admitted to the hospital for further hydration and symptom control.    Covid-19  Kym Lemus was evaluated during a global COVID-19 pandemic, which necessitated consideration that the patient might be at risk for infection with the SARS-CoV-2 virus that causes COVID-19.   Applicable protocols for evaluation were followed during the patient's care.   COVID-19 was considered as part of the patient's evaluation. The plan for testing is:  a test was obtained during this visit.    Diagnosis:    ICD-10-CM    1. DEREK (acute kidney injury) (H)  N17.9    2. Vomiting, intractability of vomiting not specified, presence of nausea not specified, unspecified vomiting type  R11.10    3. Abdominal pain, generalized  R10.84        Scribe Disclosure:  I, Cameron Palma, am serving as a scribe at 6:37 PM on 7/15/2021 to document services personally performed by Kyle Kaufman MD based on my observations and the provider's statements  to me.          Kyle Kaufman MD  07/15/21 [id number]       Kyle Kaufman MD  07/15/21 9242

## 2021-07-16 LAB
AMPHETAMINES UR QL SCN: NORMAL
ANION GAP SERPL CALCULATED.3IONS-SCNC: 9 MMOL/L (ref 3–14)
ATRIAL RATE - MUSE: 115 BPM
BARBITURATES UR QL: NORMAL
BASOPHILS # BLD AUTO: 0 10E3/UL (ref 0–0.2)
BASOPHILS NFR BLD AUTO: 0 %
BENZODIAZ UR QL: NORMAL
BUN SERPL-MCNC: 38 MG/DL (ref 7–30)
CALCIUM SERPL-MCNC: 8.5 MG/DL (ref 8.5–10.1)
CANNABINOIDS UR QL SCN: NORMAL
CHLORIDE BLD-SCNC: 103 MMOL/L (ref 94–109)
CO2 SERPL-SCNC: 22 MMOL/L (ref 20–32)
COCAINE UR QL: NORMAL
CREAT SERPL-MCNC: 1.89 MG/DL (ref 0.52–1.04)
DIASTOLIC BLOOD PRESSURE - MUSE: NORMAL MMHG
EOSINOPHIL # BLD AUTO: 0 10E3/UL (ref 0–0.7)
EOSINOPHIL NFR BLD AUTO: 0 %
ERYTHROCYTE [DISTWIDTH] IN BLOOD BY AUTOMATED COUNT: 11.9 % (ref 10–15)
GFR SERPL CREATININE-BSD FRML MDRD: 34 ML/MIN/1.73M2
GLUCOSE BLD-MCNC: 138 MG/DL (ref 70–99)
GLUCOSE BLDC GLUCOMTR-MCNC: 113 MG/DL (ref 70–99)
GLUCOSE BLDC GLUCOMTR-MCNC: 118 MG/DL (ref 70–99)
GLUCOSE BLDC GLUCOMTR-MCNC: 143 MG/DL (ref 70–99)
HCT VFR BLD AUTO: 33.7 % (ref 35–47)
HGB BLD-MCNC: 11.1 G/DL (ref 11.7–15.7)
IMM GRANULOCYTES # BLD: 0 10E3/UL
IMM GRANULOCYTES NFR BLD: 0 %
INTERPRETATION ECG - MUSE: NORMAL
LYMPHOCYTES # BLD AUTO: 1.8 10E3/UL (ref 0.8–5.3)
LYMPHOCYTES NFR BLD AUTO: 22 %
MAGNESIUM SERPL-MCNC: 2 MG/DL (ref 1.6–2.3)
MCH RBC QN AUTO: 27.9 PG (ref 26.5–33)
MCHC RBC AUTO-ENTMCNC: 32.9 G/DL (ref 31.5–36.5)
MCV RBC AUTO: 85 FL (ref 78–100)
MONOCYTES # BLD AUTO: 1.1 10E3/UL (ref 0–1.3)
MONOCYTES NFR BLD AUTO: 14 %
NEUTROPHILS # BLD AUTO: 5.2 10E3/UL (ref 1.6–8.3)
NEUTROPHILS NFR BLD AUTO: 64 %
NRBC # BLD AUTO: 0 10E3/UL
NRBC BLD AUTO-RTO: 0 /100
OPIATES UR QL SCN: NORMAL
P AXIS - MUSE: 65 DEGREES
PCP UR QL SCN: NORMAL
PLATELET # BLD AUTO: 371 10E3/UL (ref 150–450)
POTASSIUM BLD-SCNC: 3.6 MMOL/L (ref 3.4–5.3)
PR INTERVAL - MUSE: 154 MS
QRS DURATION - MUSE: 78 MS
QT - MUSE: 330 MS
QTC - MUSE: 456 MS
R AXIS - MUSE: 70 DEGREES
RBC # BLD AUTO: 3.98 10E6/UL (ref 3.8–5.2)
SODIUM SERPL-SCNC: 134 MMOL/L (ref 133–144)
SYSTOLIC BLOOD PRESSURE - MUSE: NORMAL MMHG
T AXIS - MUSE: 37 DEGREES
VENTRICULAR RATE- MUSE: 115 BPM
WBC # BLD AUTO: 8.1 10E3/UL (ref 4–11)

## 2021-07-16 PROCEDURE — 96372 THER/PROPH/DIAG INJ SC/IM: CPT

## 2021-07-16 PROCEDURE — 250N000013 HC RX MED GY IP 250 OP 250 PS 637: Performed by: HOSPITALIST

## 2021-07-16 PROCEDURE — 250N000011 HC RX IP 250 OP 636: Performed by: INTERNAL MEDICINE

## 2021-07-16 PROCEDURE — C9113 INJ PANTOPRAZOLE SODIUM, VIA: HCPCS | Performed by: INTERNAL MEDICINE

## 2021-07-16 PROCEDURE — 85004 AUTOMATED DIFF WBC COUNT: CPT | Performed by: INTERNAL MEDICINE

## 2021-07-16 PROCEDURE — 250N000009 HC RX 250: Performed by: INTERNAL MEDICINE

## 2021-07-16 PROCEDURE — 99226 PR SUBSEQUENT OBSERVATION CARE,LEVEL III: CPT | Performed by: HOSPITALIST

## 2021-07-16 PROCEDURE — 80048 BASIC METABOLIC PNL TOTAL CA: CPT | Performed by: INTERNAL MEDICINE

## 2021-07-16 PROCEDURE — 83735 ASSAY OF MAGNESIUM: CPT | Performed by: INTERNAL MEDICINE

## 2021-07-16 PROCEDURE — 250N000013 HC RX MED GY IP 250 OP 250 PS 637: Performed by: INTERNAL MEDICINE

## 2021-07-16 PROCEDURE — 96375 TX/PRO/DX INJ NEW DRUG ADDON: CPT

## 2021-07-16 PROCEDURE — G0378 HOSPITAL OBSERVATION PER HR: HCPCS

## 2021-07-16 PROCEDURE — 80307 DRUG TEST PRSMV CHEM ANLYZR: CPT | Performed by: INTERNAL MEDICINE

## 2021-07-16 PROCEDURE — 258N000003 HC RX IP 258 OP 636: Performed by: EMERGENCY MEDICINE

## 2021-07-16 PROCEDURE — 96361 HYDRATE IV INFUSION ADD-ON: CPT

## 2021-07-16 PROCEDURE — 258N000003 HC RX IP 258 OP 636: Performed by: INTERNAL MEDICINE

## 2021-07-16 PROCEDURE — 36415 COLL VENOUS BLD VENIPUNCTURE: CPT | Performed by: INTERNAL MEDICINE

## 2021-07-16 RX ORDER — MAGNESIUM CARB/ALUMINUM HYDROX 105-160MG
296 TABLET,CHEWABLE ORAL ONCE
Status: COMPLETED | OUTPATIENT
Start: 2021-07-16 | End: 2021-07-16

## 2021-07-16 RX ORDER — LORAZEPAM 2 MG/ML
0.5 INJECTION INTRAMUSCULAR EVERY 6 HOURS PRN
Status: DISCONTINUED | OUTPATIENT
Start: 2021-07-16 | End: 2021-07-17 | Stop reason: HOSPADM

## 2021-07-16 RX ORDER — DEXTROSE MONOHYDRATE 25 G/50ML
25-50 INJECTION, SOLUTION INTRAVENOUS
Status: DISCONTINUED | OUTPATIENT
Start: 2021-07-16 | End: 2021-07-17 | Stop reason: HOSPADM

## 2021-07-16 RX ORDER — ONDANSETRON 2 MG/ML
4 INJECTION INTRAMUSCULAR; INTRAVENOUS EVERY 6 HOURS PRN
Status: DISCONTINUED | OUTPATIENT
Start: 2021-07-16 | End: 2021-07-16

## 2021-07-16 RX ORDER — ONDANSETRON 2 MG/ML
4 INJECTION INTRAMUSCULAR; INTRAVENOUS EVERY 6 HOURS PRN
Status: DISCONTINUED | OUTPATIENT
Start: 2021-07-16 | End: 2021-07-17 | Stop reason: HOSPADM

## 2021-07-16 RX ORDER — HYDROMORPHONE HCL IN WATER/PF 6 MG/30 ML
0.2 PATIENT CONTROLLED ANALGESIA SYRINGE INTRAVENOUS
Status: DISCONTINUED | OUTPATIENT
Start: 2021-07-16 | End: 2021-07-17 | Stop reason: HOSPADM

## 2021-07-16 RX ORDER — NORTRIPTYLINE HCL 10 MG
30 CAPSULE ORAL AT BEDTIME
Status: DISCONTINUED | OUTPATIENT
Start: 2021-07-16 | End: 2021-07-17 | Stop reason: HOSPADM

## 2021-07-16 RX ORDER — ACETAMINOPHEN 650 MG/1
650 SUPPOSITORY RECTAL EVERY 6 HOURS PRN
Status: DISCONTINUED | OUTPATIENT
Start: 2021-07-16 | End: 2021-07-17 | Stop reason: HOSPADM

## 2021-07-16 RX ORDER — NALOXONE HYDROCHLORIDE 0.4 MG/ML
0.4 INJECTION, SOLUTION INTRAMUSCULAR; INTRAVENOUS; SUBCUTANEOUS
Status: DISCONTINUED | OUTPATIENT
Start: 2021-07-16 | End: 2021-07-17 | Stop reason: HOSPADM

## 2021-07-16 RX ORDER — SODIUM CHLORIDE 9 MG/ML
INJECTION, SOLUTION INTRAVENOUS CONTINUOUS
Status: DISCONTINUED | OUTPATIENT
Start: 2021-07-16 | End: 2021-07-16

## 2021-07-16 RX ORDER — PROCHLORPERAZINE MALEATE 5 MG
10 TABLET ORAL EVERY 6 HOURS PRN
Status: DISCONTINUED | OUTPATIENT
Start: 2021-07-16 | End: 2021-07-17 | Stop reason: HOSPADM

## 2021-07-16 RX ORDER — HYDRALAZINE HYDROCHLORIDE 20 MG/ML
10 INJECTION INTRAMUSCULAR; INTRAVENOUS EVERY 6 HOURS PRN
Status: DISCONTINUED | OUTPATIENT
Start: 2021-07-16 | End: 2021-07-17 | Stop reason: HOSPADM

## 2021-07-16 RX ORDER — NALOXONE HYDROCHLORIDE 0.4 MG/ML
0.2 INJECTION, SOLUTION INTRAMUSCULAR; INTRAVENOUS; SUBCUTANEOUS
Status: DISCONTINUED | OUTPATIENT
Start: 2021-07-16 | End: 2021-07-17 | Stop reason: HOSPADM

## 2021-07-16 RX ORDER — CAPSAICIN 0.025 %
CREAM (GRAM) TOPICAL 3 TIMES DAILY
Status: DISCONTINUED | OUTPATIENT
Start: 2021-07-16 | End: 2021-07-17 | Stop reason: HOSPADM

## 2021-07-16 RX ORDER — PROCHLORPERAZINE 25 MG
25 SUPPOSITORY, RECTAL RECTAL EVERY 12 HOURS PRN
Status: DISCONTINUED | OUTPATIENT
Start: 2021-07-16 | End: 2021-07-17 | Stop reason: HOSPADM

## 2021-07-16 RX ORDER — PROCHLORPERAZINE 25 MG
25 SUPPOSITORY, RECTAL RECTAL EVERY 12 HOURS PRN
Status: DISCONTINUED | OUTPATIENT
Start: 2021-07-16 | End: 2021-07-16

## 2021-07-16 RX ORDER — ONDANSETRON 4 MG/1
4 TABLET, ORALLY DISINTEGRATING ORAL EVERY 6 HOURS PRN
Status: DISCONTINUED | OUTPATIENT
Start: 2021-07-16 | End: 2021-07-16

## 2021-07-16 RX ORDER — OXYCODONE HYDROCHLORIDE 5 MG/1
5 TABLET ORAL EVERY 4 HOURS PRN
Status: DISCONTINUED | OUTPATIENT
Start: 2021-07-16 | End: 2021-07-17 | Stop reason: HOSPADM

## 2021-07-16 RX ORDER — CAPSAICIN 0.75 MG/G
CREAM TOPICAL 3 TIMES DAILY
Status: DISCONTINUED | OUTPATIENT
Start: 2021-07-16 | End: 2021-07-16

## 2021-07-16 RX ORDER — NICOTINE POLACRILEX 4 MG
15-30 LOZENGE BUCCAL
Status: DISCONTINUED | OUTPATIENT
Start: 2021-07-16 | End: 2021-07-17 | Stop reason: HOSPADM

## 2021-07-16 RX ORDER — POLYETHYLENE GLYCOL 3350 17 G/17G
17 POWDER, FOR SOLUTION ORAL 2 TIMES DAILY PRN
Status: DISCONTINUED | OUTPATIENT
Start: 2021-07-16 | End: 2021-07-17 | Stop reason: HOSPADM

## 2021-07-16 RX ORDER — PROCHLORPERAZINE MALEATE 5 MG
10 TABLET ORAL EVERY 6 HOURS PRN
Status: DISCONTINUED | OUTPATIENT
Start: 2021-07-16 | End: 2021-07-16

## 2021-07-16 RX ORDER — ACETAMINOPHEN 325 MG/1
650 TABLET ORAL EVERY 6 HOURS PRN
Status: DISCONTINUED | OUTPATIENT
Start: 2021-07-16 | End: 2021-07-17 | Stop reason: HOSPADM

## 2021-07-16 RX ORDER — LABETALOL HYDROCHLORIDE 5 MG/ML
10 INJECTION, SOLUTION INTRAVENOUS ONCE
Status: COMPLETED | OUTPATIENT
Start: 2021-07-16 | End: 2021-07-16

## 2021-07-16 RX ORDER — AMLODIPINE BESYLATE 5 MG/1
5 TABLET ORAL DAILY
Status: DISCONTINUED | OUTPATIENT
Start: 2021-07-16 | End: 2021-07-17

## 2021-07-16 RX ORDER — ONDANSETRON 4 MG/1
4 TABLET, ORALLY DISINTEGRATING ORAL EVERY 6 HOURS PRN
Status: DISCONTINUED | OUTPATIENT
Start: 2021-07-16 | End: 2021-07-17 | Stop reason: HOSPADM

## 2021-07-16 RX ADMIN — LABETALOL HYDROCHLORIDE 10 MG: 5 INJECTION, SOLUTION INTRAVENOUS at 04:24

## 2021-07-16 RX ADMIN — SODIUM CHLORIDE: 9 INJECTION, SOLUTION INTRAVENOUS at 00:54

## 2021-07-16 RX ADMIN — OXYCODONE HYDROCHLORIDE 5 MG: 5 TABLET ORAL at 01:16

## 2021-07-16 RX ADMIN — CAPSAICIN 1 APPLICATOR: 0.25 CREAM TOPICAL at 20:01

## 2021-07-16 RX ADMIN — LORAZEPAM 0.5 MG: 2 INJECTION INTRAMUSCULAR; INTRAVENOUS at 01:52

## 2021-07-16 RX ADMIN — HYDRALAZINE HYDROCHLORIDE 10 MG: 20 INJECTION INTRAMUSCULAR; INTRAVENOUS at 01:52

## 2021-07-16 RX ADMIN — CAPSAICIN: 0.25 CREAM TOPICAL at 17:07

## 2021-07-16 RX ADMIN — OXYCODONE HYDROCHLORIDE 5 MG: 5 TABLET ORAL at 05:28

## 2021-07-16 RX ADMIN — SODIUM CHLORIDE 1000 ML: 9 INJECTION, SOLUTION INTRAVENOUS at 04:22

## 2021-07-16 RX ADMIN — AMLODIPINE BESYLATE 5 MG: 5 TABLET ORAL at 14:07

## 2021-07-16 RX ADMIN — PANTOPRAZOLE SODIUM 40 MG: 40 INJECTION, POWDER, FOR SOLUTION INTRAVENOUS at 08:21

## 2021-07-16 RX ADMIN — MAGNESIUM CITRATE 296 ML: 1.75 LIQUID ORAL at 14:45

## 2021-07-16 RX ADMIN — HYDROMORPHONE HYDROCHLORIDE 0.2 MG: 0.2 INJECTION, SOLUTION INTRAMUSCULAR; INTRAVENOUS; SUBCUTANEOUS at 06:01

## 2021-07-16 RX ADMIN — NORTRIPTYLINE HYDROCHLORIDE 30 MG: 10 CAPSULE ORAL at 22:47

## 2021-07-16 RX ADMIN — ACETAMINOPHEN 650 MG: 325 TABLET, FILM COATED ORAL at 01:16

## 2021-07-16 RX ADMIN — FAMOTIDINE 20 MG: 10 INJECTION, SOLUTION INTRAVENOUS at 01:17

## 2021-07-16 NOTE — ED NOTES
[facility]  ED Nurse Handoff Report    Kym Lemus is a 31 year old female   ED Chief complaint: Abdominal Pain  . ED Diagnosis:   Final diagnoses:   DEREK (acute kidney injury) (H)   Vomiting, intractability of vomiting not specified, presence of nausea not specified, unspecified vomiting type   Abdominal pain, generalized     Allergies:   Allergies   Allergen Reactions     Metformin Diarrhea       Code Status: Full Code  Activity level - Baseline/Home:  Independent. Activity Level - Current:   Stand by Assist. Lift room needed: No. Bariatric: No   Needed: No   Isolation: No. Infection: Not Applicable.     Vital Signs:   Vitals:    07/15/21 1821 07/15/21 1930 07/15/21 1945 07/15/21 2000   BP: (!) 166/122 (!) 192/125 (!) 187/123 (!) 164/112   Pulse: (!) 130 113 116 (!) 126   Resp: 16 24 22 (!) 32   Temp: 98  F (36.7  C)      TempSrc: Oral      SpO2: 100% 100% 100% 100%       Cardiac Rhythm:  ,      Pain level:    Patient confused: No. Patient Falls Risk: No.   Elimination Status: has not voided in ED  Patient Report - Initial Complaint: abd pain. Focused Assessment: Review of Systems   Constitutional: Negative for fever.   Respiratory: Negative for cough and shortness of breath.    Cardiovascular: Negative for chest pain.   Gastrointestinal: Positive for abdominal pain, nausea and vomiting.   Genitourinary: Positive for difficulty urinating.   Musculoskeletal: Positive for back pain.   All other systems reviewed and are negative.   Tests Performed:   No orders to display     Labs Ordered and Resulted from Time of ED Arrival Up to the Time of Departure from the ED   COMPREHENSIVE METABOLIC PANEL - Abnormal; Notable for the following components:       Result Value    Sodium 131 (*)     Urea Nitrogen 49 (*)     Creatinine 2.90 (*)     Calcium 10.2 (*)     Glucose 155 (*)     Protein Total 9.9 (*)     Albumin 5.1 (*)     GFR Estimate 20 (*)     All other components within normal limits   CBC  WITH PLATELETS AND DIFFERENTIAL - Abnormal; Notable for the following components:    WBC Count 11.3 (*)     Platelet Count 474 (*)     All other components within normal limits   LIPASE - Normal   ISTAT HCG QUANTITATIVE PREGNANCY POCT - Normal   EXTRA RED TOP TUBE   EXTRA GREEN TOP (LITHIUM HEPARIN) TUBE   EXTRA PURPLE TOP TUBE   ROUTINE UA WITH MICROSCOPIC REFLEX TO CULTURE   SARS-COV2 (COVID-19) VIRUS RT-PCR   CARDIAC CONTINUOUS MONITORING   ISTAT HCG QUANTITATIVE PREGNANCY NURSING POCT   COVID-19 VIRUS (CORONAVIRUS) BY PCR    Narrative:     The following orders were created for panel order Asymptomatic COVID-19 Virus (Coronavirus) by PCR Nasopharyngeal.  Procedure                               Abnormality         Status                     ---------                               -----------         ------                     SARS-COV2 (COVID-19) Vir...[[id number]]                                                   Please view results for these tests on the individual orders.   CBC WITH PLATELETS & DIFFERENTIAL    Narrative:     The following orders were created for panel order CBC with platelets differential.  Procedure                               Abnormality         Status                     ---------                               -----------         ------                     CBC with platelets and d...[[id number]]  Abnormal            Final result                 Please view results for these tests on the individual orders.   EXTRA TUBE    Narrative:     The following orders were created for panel order Extra Tube ([place] Draw).  Procedure                               Abnormality         Status                     ---------                               -----------         ------                     Extra Red Top Tube[[id number]]                               Final result               Extra Green Top (Lithium...[[id number]]                      Final result               Extra Purple Top Tube[[id number]]                             Final result                 Please view results for these tests on the individual orders.   . Abnormal Results:   Abnormal Labs Reviewed   COMPREHENSIVE METABOLIC PANEL - Abnormal; Notable for the following components:       Result Value    Sodium 131 (*)     Urea Nitrogen 49 (*)     Creatinine 2.90 (*)     Calcium 10.2 (*)     Glucose 155 (*)     Protein Total 9.9 (*)     Albumin 5.1 (*)     GFR Estimate 20 (*)     All other components within normal limits   CBC WITH PLATELETS AND DIFFERENTIAL - Abnormal; Notable for the following components:    WBC Count 11.3 (*)     Platelet Count 474 (*)     All other components within normal limits   .   Treatments provided: ed meds  Family Comments: n/a  OBS brochure/video discussed/provided to patient:  Yes  ED Medications:   Medications   0.9% sodium chloride BOLUS (1,000 mLs Intravenous New Bag 7/15/21 1856)     Followed by   sodium chloride 0.9% infusion (has no administration in time range)   haloperidol lactate (HALDOL) injection 2.5 mg (2.5 mg Intravenous Given 7/15/21 1855)   0.9% sodium chloride BOLUS (1,000 mLs Intravenous New Bag 7/15/21 2020)     Drips infusing:  Yes  For the majority of the shift, the patient's behavior Green. Interventions performed were n/a.    Sepsis treatment initiated: No     Patient tested for COVID 19 prior to admission: YES    ED Nurse Name/Phone Number: Leida Escalera RN,   8:27 PM    RECEIVING UNIT ED HANDOFF REVIEW    Above ED Nurse Handoff Report was reviewed: Yes  Reviewed by: Lexi Webb RN on July 15, 2021 at 9:13 PM

## 2021-07-16 NOTE — PROGRESS NOTES
[facility] notified of patient with hypertension and tachycardia.  Rates now sustaining ~150.  Reviewed chart and telemetry.  She is a 31-year-old female with history of diabetic gastroparesis, CKD, HTN, chronic abdominal pain, and suspected cannabis hyperemesis syndrome who is here with nausea, vomiting, abdominal pain, and DEREK.  Hypotensive and tachycardic while here.  Heart rate now up to the 150s.  On telemetry there is gradual rise from the 120s to one fifty and this appears to be sinus tachycardia which is what her EKG also showed.  May be in part secondary to dehydration.  -Give 1 L normal saline bolus now over 1 hour and continue NS at one hundred ml/hr after this  -Give 10 mg IV labetalol

## 2021-07-16 NOTE — PLAN OF CARE
PRIMARY DIAGNOSIS: ABD PAIN, NAUSEA/VOMITTING  OUTPATIENT/OBSERVATION GOALS TO BE MET BEFORE DISCHARGE:  1. Pain Status: Improved-controlled with oral pain medications.    2. Return to near baseline physical activity: Yes    3. Cleared for discharge by consultants (if involved): No    Discharge Planner Nurse   Safe discharge environment identified: No  Barriers to discharge: Yes       Entered by: Lexi Webb 07/16/2021 1:02 PM    Vitals are Temp: 98.9  F (37.2  C) Temp src: Oral BP: 120/78 Pulse: 105   Resp: 20 SpO2: 98 %.  Patient is Alert and Oriented x4. They are SBA with IV pole. Pt is a Full liquid diet.  They are complaining of pain in their abdomen, tylenol and oxy given with relief. Denies nausea. Patient was able to eat grapes and yogurt for breakfast this morning. Continue to monitor.    Please review provider order for any additional goals.   Nurse to notify provider when observation goals have been met and patient is ready for discharge.

## 2021-07-16 NOTE — PLAN OF CARE
PRIMARY DIAGNOSIS: ABD PAIN  OUTPATIENT/OBSERVATION GOALS TO BE MET BEFORE DISCHARGE:  1. Pain Status: Improved-controlled with oral pain medications.    2. Return to near baseline physical activity: Yes    3. Cleared for discharge by consultants (if involved): No    Discharge Planner Nurse   Safe discharge environment identified: No  Barriers to discharge: Yes       Entered by: Elzbieta Ordonez 07/16/2021 2:36 AM     Please review provider order for any additional goals.   Nurse to notify provider when observation goals have been met and patient is ready for discharge.

## 2021-07-16 NOTE — PHARMACY-ADMISSION MEDICATION HISTORY
Admission medication history interview status for this patient is complete. See [facility] admission navigator for allergy information, prior to admission medications and immunization status.     Medication history interview done, indicate source(s): Patient  Medication history resources (including written lists, pill bottles, clinic record):None      Changes made to PTA medication list:  Added: none  Deleted: none  Changed: pamelor     Actions taken by pharmacist (provider contacted, etc):None     Additional medication history information:None    Medication reconciliation/reorder completed by provider prior to medication history?  n   (Y/N)         Prior to Admission medications    Medication Sig Last Dose Taking? Auth Provider   latanoprost (XALATAN) 0.005 % ophthalmic solution Place 1 drop into both eyes At Bedtime Past Week at Unknown time Yes Unknown, Entered By History   nortriptyline (PAMELOR) 10 MG capsule Take 30 mg by mouth At Bedtime  7/14/2021 at Unknown time Yes Reported, Patient   OLANZapine (ZYPREXA) 5 MG tablet Take 0.5-1 tablets (2.5-5 mg) by mouth every 8 hours as needed (nausea not controlled by other medications)  Yes Bernice Hebert PA   ondansetron (ZOFRAN) 8 MG tablet Take 0.5-1 tablets (4-8 mg) by mouth every 8 hours as needed for nausea (may cause headache or constipation)  Yes Bernice Hebert PA   prochlorperazine (COMPAZINE) 10 MG tablet Take 1 tablet (10 mg) by mouth every 6 hours as needed for nausea or vomiting  Yes Bernice Hebert PA

## 2021-07-16 NOTE — PLAN OF CARE
"PRIMARY DIAGNOSIS: Abdominal Pain, Nausea/Vomitting  OUTPATIENT/OBSERVATION GOALS TO BE MET BEFORE DISCHARGE:  1. ADLs back to baseline: No    2. Activity and level of assistance: Ambulating independently.    3. Pain status: No, abdominal pain thought secondary to constipation    4. Return to near baseline physical activity: No    Blood pressure (!) 144/95, pulse (!) 121, temperature 98.7  F (37.1  C), temperature source Oral, resp. rate 18, height 1.651 m (5' 5\"), weight 71.8 kg (158 lb 3.2 oz), SpO2 99 %.    VSS.  LS clear, adequate sats on room air.  Patient c/o of left low back and left low abdominal pain described as cramping, thought  secondary to constipation and administration of Mag Citrate.  Capsaicin creme applied.  No further episodes of nausea or emesis.  Patient is up independently in room, ambulating to the restroom prn. Plan:  Continue to provide supportive cares.  Probable discharge tomorrow.        Discharge Planner Nurse   Safe discharge environment identified: Yes  Barriers to discharge: Yes, unless medically cleared.         Entered by: Karen M. Lesch 07/16/2021 6:08 PM     Please review provider order for any additional goals.   Nurse to notify provider when observation goals have been met and patient is ready for discharge.  "

## 2021-07-16 NOTE — H&P
[facility]  Hospitalist Admission Note  Name: Kym Lemus    MRN: [id number]  YOB: 1990    Age: 31 year old  Date of admission: 7/15/2021  Primary care provider: [place], [facility]            Assessment and Plan:    Kym Lemus is a 31 year old -American lady with known history of recurrent issues of nausea, vomiting and abdominal pain felt in the past secondary to underlying diabetic gastroparesis, suspected cannabis hyperemesis syndrome female, CKD, chronic abdominal pain, hypertension, with also concerns for getting fractured care as she has several hospitalizations, ED visits and subspecialty follow-up in multiple different healthcare system as documented on her last discharge summary.  She presented in the emergency room earlier today for nausea, vomiting, abdominal pain that has been going on at least for the past 2 days      1.  Nausea, vomiting, abdominal pain  2.  DEREK on top of CKD  3.  Mild hyponatremia  4.  Previously diagnosed with diabetic gastroparesis  5.  Prior suspicion of cannabis hyperemesis syndrome  6.  Uncontrolled hypertension  7.  Diabetes mellitus type 2  8.  Mild hypercalcemia    Sikes under observation.  Continue with generous IV fluid support for tonight.  Recheck metabolic panel in the morning.  Provide her with IV Pepcid tonight.  Start her with IV Protonix the next day.  Advance diet as tolerated.  Anticipating hyponatremic will be improving after IV fluid support.  Hypercalcemia likely from anticipating going through after IV hydration  -Check urine analysis currently pending, check urine drug screen.  -Keep your under insulin sliding scale for now.  -As needed antiemetics ordered.  Zofran, Compazine and may also utilize intravenous lorazepam if with intractable symptoms.  EKG reassuring with normal QTC  -Holding further imaging for tonight but if with persistent symptoms then might need of imaging studies.    Code  status: Full code   registered under observation  Prophylaxis: Ambulate, PCD's if staying mostly in  Disposition: Home anticipating at least in the next 24 hours once tolerating more oral intake and improving kidney function          Chief Complaint:   Nausea, vomiting, abdominal pain       Source of Information:   Patient with fair reliability  Discussion with ED physician  Review of E chart records           History of Present Illness:   Kym Lemus is a 31 year old -American lady with known history of recurrent issues of nausea, vomiting and abdominal pain felt in the past secondary to underlying diabetic gastroparesis, suspected cannabis hyperemesis syndrome female, CKD, chronic abdominal pain, hypertension, with also concerns for getting fractured care as she has several hospitalizations, ED visits and subspecialty follow-up in multiple different healthcare system as documented on her last discharge summary.  She presented in the emergency room earlier today for nausea, vomiting, abdominal pain that has been going on at least for the past 2 days.  She tried to utilize some acetaminophen but did not get much relief of symptoms.  There was no report of any bleeding tendencies such as hematuria, melanotic stools or bright red blood per rectum.  No reported fevers, chills, shortness of breath, mental status changes, palpitations, focal weakness or fall events.  She denies any frequent EtOH use.  Upon presentation she was found to be tachycardic, minimal leukocytosis, pending urine analysis, negative serum pregnancy tests, pending urine drug screen, elevated creatinine at 2.9, accompanied with hyponatremia, azotemia and mild hypercalcemia.  She was provided with IV fluids, given also with Haldol for nausea and vomiting and provided some relief of symptoms but due to concerns of several electrolyte derangements, increased creatinine levels, and abdominal symptomatology her case was referred to us for  further evaluation and care hence this hospitalization.     Earlier nursing service reported that she was politely declining further interaction.  During the time of my encounter she was able to provide with some further information.  She also mentioned that she is already somewhat improving from earlier nausea and vomiting.          Past Medical History:     Past Medical History:   Diagnosis Date     Cannabinoid hyperemesis syndrome      Diabetes mellitus type 2, diet-controlled (H)              Past Surgical History:   No past surgical history on file.          Social History:     Social History     Tobacco Use     Smoking status: Never Smoker     Smokeless tobacco: Never Used   Substance Use Topics     Alcohol use: Not Currently             Family History:   Family history was fully reviewed and non-contributory in this case.         Allergies:     Allergies   Allergen Reactions     Metformin Diarrhea             Medications:     Prior to Admission medications    Medication Sig Last Dose Taking? Auth Provider   latanoprost (XALATAN) 0.005 % ophthalmic solution Place 1 drop into both eyes At Bedtime   Unknown, Entered By History   nortriptyline (PAMELOR) 10 MG capsule Take 10 mg by mouth See Admin Instructions Titrate up to 4 capsules once daily in the evening. Start with 1 cap daily for 7 days, then increase to 2 caps daily for 7 days, then 3 caps daily for 7 days, then 4 caps daily thereafter.   Reported, Patient   OLANZapine (ZYPREXA) 5 MG tablet Take 0.5-1 tablets (2.5-5 mg) by mouth every 8 hours as needed (nausea not controlled by other medications)   Bernice Hebert PA   ondansetron (ZOFRAN) 8 MG tablet Take 0.5-1 tablets (4-8 mg) by mouth every 8 hours as needed for nausea (may cause headache or constipation)   Bernice Hebert PA   prochlorperazine (COMPAZINE) 10 MG tablet Take 1 tablet (10 mg) by mouth every 6 hours as needed for nausea or vomiting   Bernice Hebert PA             Review of  Systems:   A Comprehensive greater than 10 system review of systems was carried out.  Pertinent positives and negatives are noted above.  Otherwise negative for contributory information.           Physical Exam:   Blood pressure (!) 164/112, pulse (!) 126, temperature 98  F (36.7  C), temperature source Oral, resp. rate (!) 32, SpO2 100 %.  Wt Readings from Last 1 Encounters:   06/11/21 67.4 kg (148 lb 8 oz)     Exam:  GENERAL: No apparent distress. Awake, alert, and fully oriented.  HEENT: Normocephalic, atraumatic. Extraocular movements intact.  CARDIOVASCULAR: Regular rate and rhythm without murmurs or rubs. No JVD  PULMONARY: Clear to auscultation, no wheezes, crackles  ABDOMINAL: Soft, nondistended, diffusely tender no guarding.  EXTREMITIES: No cyanosis or clubbing. No edema.  NEUROLOGICAL: CN 2-12 grossly intact, awake and alert x3, spontaneous and coherent speech. no focal neurological deficits.  DERMATOLOGICAL: No rash, ulcer, ecchymoses, jaundice.  Psych: not agitation, not combative, pleasant mood           Data:   EKG: EKG reported to be sinus tachycardia with QTC of 456 MS    Imaging:  Results for orders placed or performed during the hospital encounter of 05/13/21   CT Abdomen Pelvis w/o Contrast    Narrative    EXAM: CT ABDOMEN PELVIS W/O CONTRAST  LOCATION: [facility]  DATE/TIME: 5/13/2021 7:37 PM    INDICATION: Abdominal pain, acute, nonlocalized  COMPARISON: 9/11/2018  TECHNIQUE: CT scan of the abdomen and pelvis was performed without IV contrast. Multiplanar reformats were obtained. Dose reduction techniques were used.  CONTRAST: None.    FINDINGS:   LOWER CHEST: Normal.    HEPATOBILIARY: Normal.    PANCREAS: Normal.    SPLEEN: Normal.    ADRENAL GLANDS: Normal.    KIDNEYS/BLADDER: Normal.    BOWEL: No obstruction or inflammatory change. Appendix normal.    LYMPH NODES: Normal.    VASCULATURE: Unremarkable.    PELVIC ORGANS: Vaginal collection device, surrounds cervix. No adnexal  lesions. Small calcified uterine fibroids.    MUSCULOSKELETAL: No suspicious bony lesions. Significant interval weight loss.      Impression    IMPRESSION:   1.  No etiology for symptoms evident. Nothing obstructive or inflammatory.           Labs:  No results for input(s): CULT in the last 168 hours.  No results for input(s): PH, PHARTERIAL, PO2, [id number], SAT, PCO2, HCO3, BASEEXCESS, JOSEE, BEB in the last 168 hours.    Invalid input(s): [id number]  Recent Labs   Lab 07/15/21  1851   WBC 11.3*   HGB 13.4   HCT 40.3   MCV 84   *     Recent Labs   Lab 07/15/21  1851   *   POTASSIUM 3.8   CHLORIDE 94   CO2 25   ANIONGAP 12   *   BUN 49*   CR 2.90*   GFRESTIMATED 20*   DIMITRI 10.2*   PROTTOTAL 9.9*   ALBUMIN 5.1*   BILITOTAL 0.8   ALKPHOS 111   AST 33   ALT 30     No results for input(s): SED, CRP in the last 168 hours.  Recent Labs   Lab 07/15/21  1851   *     No results for input(s): INR in the last 168 hours.  Recent Labs   Lab 07/15/21  1851   LIPASE 96     No results for input(s): TROPONIN, TROPI, TROPR in the last 168 hours.    Invalid input(s): TROP, TROPONINIES

## 2021-07-16 NOTE — PLAN OF CARE
ROOM # 228    Living Situation (if not independent, order SW consult):  Facility name:  :    Activity level at baseline:   Activity level on admit:       Patient registered to observation; given Patient Bill of Rights; given the opportunity to ask questions about observation status and their plan of care.  Patient has been oriented to the observation room, bathroom and call light is in place.    Discussed discharge goals and expectations with patient/family.

## 2021-07-16 NOTE — PLAN OF CARE
Patient refused to complete admission process. Patient also refused to speak to the pharmacy when they called the room to perform medication reconciliation. Patient was educated on the importance of speaking to pharmacy so she could receive medication on the unit. Patient requested to sleep and do all these things later.

## 2021-07-16 NOTE — PROGRESS NOTES
[facility]    Medicine Progress Note - Hospitalist Service       Date of Admission:  7/15/2021    Assessment & Plan           Kym Lemus is a 31 year old with known history of recurrent issues of nausea, vomiting and abdominal pain initially felt in the past secondary to underlying diabetic gastroparesis now suspected cannabis hyperemesis syndrome female, CKD, DM (diet controlled), hypertension, with also concerns for getting fractured care as she has several hospitalizations, ED visits and subspecialty follow-up in multiple different healthcare system as documented on her last discharge summary.  She presented in the emergency room 7/15/2021 for nausea, vomiting, abdominal pain    Nausea, vomiting and abdominal pain    - likely due to cannabinoid hyperemesis syndrome    - pain is in lower abdomen and right flank    - no imaging on admission, no need for imaging    - UA did not show significant WBC    - vomiting has improved, off IVF    - still has abdo pain: will cont oxy, tylenol, add capsaicin    - LBM was Monday, so will try mag citrate and miralax    - I gave patient literature on hyperemesis syndrome    - I also showed her that she had 7 CT scans, 2 EGDs, 2 abdo xrays, and a gastric emptying study all since 2018 and that all were normal    Acute on chronic renal failure    - may be in part due to all the CT scans she receives    - improved to her baseline after IVF    - has outpt nephrology folow-up    Sinus tach last night    - likely multifactorial (discomfort, dehydration)    - discontinue tele    DM    - diet controlled    - on ISS    HTN    - not on meds at home    - will start norvasc: follow-up and titrate as outpt       Diet: Advance Diet as Tolerated: Regular Diet Adult    DVT Prophylaxis: Low Risk/Ambulatory with no VTE prophylaxis indicated  Gallagher Catheter: Not present  Central Lines: None  Code Status: Full Code      Disposition Plan   Expected discharge: 07/17/2021  recommended to prior living arrangement once adequate pain management/ tolerating PO medications.     The patient's care was discussed with the Bedside Nurse and Patient.    Mac Ellis MD  Hospitalist Service  [facility]  Securely message with the Vocera Web Console (learn more here)  Text page via [employer] Paging/Directory      Risk Factors Present on Admission                   ______________________________________________________________________    Interval History   Patient standing in room. She states this is more comfortable. She denies states nausea and vomiting are better. Tolerated minimal food, ok with fluids. Abdo pain (Low left, right flank/back) is a little better, but still present    Data reviewed today: I reviewed all medications, new labs and imaging results over the last 24 hours. I personally reviewed no images or EKG's today.    Physical Exam   Vital Signs: Temp: 97.8  F (36.6  C) Temp src: Oral BP: (!) 163/124 Pulse: 114   Resp: 22 SpO2: 98 % O2 Device: None (Room air)    Weight: 158 lbs 3.2 oz  Constitutional: awake, alert, cooperative, no apparent distress, and appears stated age  Eyes: Lids and lashes normal, pupils equal, round and reactive to light, extra ocular muscles intact, sclera clear, conjunctiva normal  ENT: Normocephalic, without obvious abnormality, atraumatic, sinuses nontender on palpation, external ears without lesions, oral pharynx with moist mucous membranes, tonsils without erythema or exudates, gums normal and good dentition.  Respiratory: No increased work of breathing, good air exchange, clear to auscultation bilaterally, no crackles or wheezing  Cardiovascular: Normal apical impulse, regular rate and rhythm, normal S1 and S2, no S3 or S4, and no murmur noted  GI: soft, +BS. LLQ tenderness with generalized tenderness  Skin: no bruising or bleeding  Musculoskeletal: no lower extremity pitting edema present    Data   Recent Labs   Lab  07/16/21  0546 07/16/21  0133 07/15/21  1851   WBC 8.1  --  11.3*   HGB 11.1*  --  13.4   MCV 85  --  84     --  474*     --  131*   POTASSIUM 3.6  --  3.8   CHLORIDE 103  --  94   CO2 22  --  25   BUN 38*  --  49*   CR 1.89*  --  2.90*   ANIONGAP 9  --  12   DIMITRI 8.5  --  10.2*   * 113* 155*   ALBUMIN  --   --  5.1*   PROTTOTAL  --   --  9.9*   BILITOTAL  --   --  0.8   ALKPHOS  --   --  111   ALT  --   --  30   AST  --   --  33   LIPASE  --   --  96     No results found for this or any previous visit (from the past 24 hour(s)).

## 2021-07-16 NOTE — PLAN OF CARE
PRIMARY DIAGNOSIS: ABD PAIN, NAUSEA/VOMITTING  OUTPATIENT/OBSERVATION GOALS TO BE MET BEFORE DISCHARGE:  1. Pain Status: Improved-controlled with oral pain medications.    2. Return to near baseline physical activity: Yes    3. Cleared for discharge by consultants (if involved): No    Discharge Planner Nurse   Safe discharge environment identified: No  Barriers to discharge: Yes       Entered by: Lexi Webb 07/16/2021 1:00 PM    Vitals are Temp: 98.9  F (37.2  C) Temp src: Oral BP: 120/78 Pulse: 105   Resp: 20 SpO2: 98 %.  Patient is Alert and Oriented x4. They are SBA with IV pole. Pt is a Full liquid diet.  They are complaining of pain in their abdomen, tylenol and oxy given with relief. Denies nausea. Continue to monitor.    Please review provider order for any additional goals.   Nurse to notify provider when observation goals have been met and patient is ready for discharge.

## 2021-07-16 NOTE — PROVIDER NOTIFICATION
"2:58 AM  Pagegino ROGERS: \"FYI, /111, hydralazine given. Now 158/86. -130's. Asymptomatic. Do you want to order anything? Thanks.\"     3:04 AM  No orders. Continue to monitor.    4:06 AM  Pagegino ROGERS: \"FYI. Pt HR sustaining in the 150's now. Asymptomatic. Thanks.\"    4:16 AM  Orders: Give 1 L normal saline bolus now over 1 hour and continue NS at one hundred ml/hr after this  -Give 10 mg IV labetalol  "

## 2021-07-16 NOTE — PLAN OF CARE
PRIMARY DIAGNOSIS: ABD PAIN, NAUSEA/VOMITTING  OUTPATIENT/OBSERVATION GOALS TO BE MET BEFORE DISCHARGE:  1. Pain Status: Improved-controlled with oral pain medications.    2. Return to near baseline physical activity: Yes    3. Cleared for discharge by consultants (if involved): No    Discharge Planner Nurse   Safe discharge environment identified: No  Barriers to discharge: Yes       Entered by: Elzbieta Ordonez 07/16/2021 4:34 AM    Vitals are Temp: 98  F (36.7  C) Temp src: Oral BP: (!) 148/99 Pulse: (!) 154   Resp: 22 SpO2: 99 %.  Patient is Alert and Oriented x4. They are SBA with IV pole.  Pt is a Full liquid diet.  They are complaining of pain in their abdomen, tylenol and oxy given with relief. Denies nausea. BP at beginning of shift 164/111, hydralazine given resulting in 158/86. HR steadily increasing up to 150's and sustaining. MD notified, ordered 1L bolus, NS@100ml/hr, and 10mg IV labetelol. Continue to monitor.    Please review provider order for any additional goals.   Nurse to notify provider when observation goals have been met and patient is ready for discharge.

## 2021-07-17 VITALS
WEIGHT: 161 LBS | SYSTOLIC BLOOD PRESSURE: 153 MMHG | TEMPERATURE: 97.9 F | DIASTOLIC BLOOD PRESSURE: 90 MMHG | HEART RATE: 121 BPM | OXYGEN SATURATION: 100 % | RESPIRATION RATE: 16 BRPM | HEIGHT: 65 IN | BODY MASS INDEX: 26.82 KG/M2

## 2021-07-17 LAB — GLUCOSE BLDC GLUCOMTR-MCNC: 142 MG/DL (ref 70–99)

## 2021-07-17 PROCEDURE — 250N000013 HC RX MED GY IP 250 OP 250 PS 637: Performed by: INTERNAL MEDICINE

## 2021-07-17 PROCEDURE — C9113 INJ PANTOPRAZOLE SODIUM, VIA: HCPCS | Performed by: INTERNAL MEDICINE

## 2021-07-17 PROCEDURE — 250N000011 HC RX IP 250 OP 636: Performed by: INTERNAL MEDICINE

## 2021-07-17 PROCEDURE — G0378 HOSPITAL OBSERVATION PER HR: HCPCS

## 2021-07-17 PROCEDURE — 250N000013 HC RX MED GY IP 250 OP 250 PS 637: Performed by: HOSPITALIST

## 2021-07-17 PROCEDURE — 96376 TX/PRO/DX INJ SAME DRUG ADON: CPT

## 2021-07-17 RX ORDER — AMLODIPINE BESYLATE 10 MG/1
10 TABLET ORAL DAILY
Qty: 30 TABLET | Refills: 0 | Status: SHIPPED | OUTPATIENT
Start: 2021-07-18 | End: 2022-07-30

## 2021-07-17 RX ORDER — AMLODIPINE BESYLATE 2.5 MG/1
2.5 TABLET ORAL ONCE
Status: COMPLETED | OUTPATIENT
Start: 2021-07-17 | End: 2021-07-17

## 2021-07-17 RX ORDER — FAMOTIDINE 10 MG
10 TABLET ORAL 2 TIMES DAILY
Qty: 30 TABLET | Refills: 1 | Status: SHIPPED | OUTPATIENT
Start: 2021-07-17 | End: 2021-08-24

## 2021-07-17 RX ORDER — AMLODIPINE BESYLATE 10 MG/1
10 TABLET ORAL DAILY
Status: DISCONTINUED | OUTPATIENT
Start: 2021-07-18 | End: 2021-07-17 | Stop reason: HOSPADM

## 2021-07-17 RX ADMIN — PANTOPRAZOLE SODIUM 40 MG: 40 INJECTION, POWDER, FOR SOLUTION INTRAVENOUS at 08:24

## 2021-07-17 RX ADMIN — CAPSAICIN: 0.25 CREAM TOPICAL at 08:24

## 2021-07-17 RX ADMIN — AMLODIPINE BESYLATE 2.5 MG: 2.5 TABLET ORAL at 13:22

## 2021-07-17 RX ADMIN — AMLODIPINE BESYLATE 5 MG: 5 TABLET ORAL at 08:24

## 2021-07-17 RX ADMIN — HYDRALAZINE HYDROCHLORIDE 10 MG: 20 INJECTION INTRAMUSCULAR; INTRAVENOUS at 05:04

## 2021-07-17 RX ADMIN — HYDRALAZINE HYDROCHLORIDE 10 MG: 20 INJECTION INTRAMUSCULAR; INTRAVENOUS at 11:19

## 2021-07-17 ASSESSMENT — MIFFLIN-ST. JEOR: SCORE: 1436.17

## 2021-07-17 NOTE — PLAN OF CARE
PRIMARY DIAGNOSIS: ABDOMINAL PAIN  OUTPATIENT/OBSERVATION GOALS TO BE MET BEFORE DISCHARGE:  1. Pain Status: Improved-controlled with oral pain medications.    2. Return to near baseline physical activity: Yes    3. Cleared for discharge by consultants (if involved): Yes    Vitals are Temp: 98.6  F (37  C) Temp src: Oral BP: 131/77 Pulse: 101   Resp: 16 SpO2: 99 %.    Patient is Alert and Oriented x4. They are independent with no assistive devices. Pt is a Regular diet. They are complaining of pain in their abdomen. Capsaicin cream is helpful with pain. Patient is Saline locked. Plan is to discharge home today 7/17      Discharge Planner Nurse   Safe discharge environment identified: Yes  Barriers to discharge: No       Entered by: Leslye Reina 07/17/2021 5:33 AM     Please review provider order for any additional goals.   Nurse to notify provider when observation goals have been met and patient is ready for discharge.

## 2021-07-17 NOTE — PLAN OF CARE
"PRIMARY DIAGNOSIS: Abdominal Pain, Nausea/Vomitting  OUTPATIENT/OBSERVATION GOALS TO BE MET BEFORE DISCHARGE:  1. ADLs back to baseline: No    2. Activity and level of assistance: Ambulating independently.    3. Pain status: No, abdominal pain thought secondary to constipation    4. Return to near baseline physical activity: No    Blood pressure 120/76, pulse 117, temperature 98.7  F (37.1  C), temperature source Oral, resp. rate 16, height 1.651 m (5' 5\"), weight 71.8 kg (158 lb 3.2 oz), SpO2 100 %.     at 1700.  Coverage per sliding scale.  No results from Mag Citrate given during previous shift.  Other medications for constipation reviewed with patient;  Will hold for now and await results. Capsaicin creme re-applied; patient stating it has helped her pain.   Patient is up independently in room, ambulating to the bathroom prn. Plan:  Continue to monitor and assess.       Discharge Planner Nurse   Safe discharge environment identified: Yes  Barriers to discharge: Yes, unless medically cleared.         Entered by: Karen M. Lesch 07/16/2021 9:20 PM     Please review provider order for any additional goals.   Nurse to notify provider when observation goals have been met and patient is ready for discharge.  "

## 2021-07-17 NOTE — PLAN OF CARE
PRIMARY DIAGNOSIS: ABDOMINAL PAIN  OUTPATIENT/OBSERVATION GOALS TO BE MET BEFORE DISCHARGE:  1. Pain Status: Improved-controlled with oral pain medications.    2. Return to near baseline physical activity: Yes    3. Cleared for discharge by consultants (if involved): Yes    Vitals are Temp: 97.9  F (36.6  C) Temp src: Oral BP: (!) 148/95 Pulse: (!) 133   Resp: 16 SpO2: 100 %.    Patient is Alert and Oriented x4. Patient consistently has elevated BP's. Patient is very active in her room, never sits down. They are independent with no assistive devices. Pt is a Regular diet. They are complaining of pain in their abdomen. Patient has appt set up with nephrology on August 3rd. Patient is Saline locked. Plan is to discharge home today 7/17.       Discharge Planner Nurse   Safe discharge environment identified: Yes  Barriers to discharge: No       Entered by: Lexi Webb 07/17/2021     Please review provider order for any additional goals. Nurse to notify provider when observation goals have been met and patient is ready for discharge.

## 2021-07-17 NOTE — PLAN OF CARE
PRIMARY DIAGNOSIS: ABDOMINAL PAIN  OUTPATIENT/OBSERVATION GOALS TO BE MET BEFORE DISCHARGE:  1. Pain Status: Improved-controlled with oral pain medications.    2. Return to near baseline physical activity: Yes    3. Cleared for discharge by consultants (if involved): Yes    Vitals are Temp: 97.9  F (36.6  C) Temp src: Oral BP: (!) 124/110 Pulse: (!) 137   Resp: 16 SpO2: 100 %.    Patient is Alert and Oriented x4. Patient consistently has elevated BP's. Patient is very active in her room, never sits down. They are independent with no assistive devices. Pt is a Regular diet. They are complaining of pain in their abdomen. Patient is Saline locked. Plan is to discharge home today 7/17.       Discharge Planner Nurse   Safe discharge environment identified: Yes  Barriers to discharge: No       Entered by: Lexi Webb 07/17/2021     Please review provider order for any additional goals. Nurse to notify provider when observation goals have been met and patient is ready for discharge.

## 2021-07-17 NOTE — PLAN OF CARE
PRIMARY DIAGNOSIS: ABDOMINAL PAIN  OUTPATIENT/OBSERVATION GOALS TO BE MET BEFORE DISCHARGE:  1. Pain Status: Improved-controlled with oral pain medications.    2. Return to near baseline physical activity: Yes    3. Cleared for discharge by consultants (if involved): Yes    Vitals are Temp: 98.6  F (37  C) Temp src: Oral BP: 134/83 Pulse: 109   Resp: 16 SpO2: 100 %.    Patient is Alert and Oriented x4. They are independent with no assistive devices. Pt is a Regular diet. They are complaining of pain in their abdomen. Capsaicin cream is helpful with pain. Patient is Saline locked. Plan is to discharge home today 7/17      Discharge Planner Nurse   Safe discharge environment identified: Yes  Barriers to discharge: No       Entered by: Leslye Reina 07/17/2021 12:46 AM     Please review provider order for any additional goals.   Nurse to notify provider when observation goals have been met and patient is ready for discharge.

## 2021-07-17 NOTE — DISCHARGE SUMMARY
[facility]    Discharge Summary  Hospitalist    Date of Admission:  7/15/2021  Date of Discharge:  7/17/2021  Discharging Provider:  MARIBEL Arce MD, FACP     Date of Service (when I saw the patient): 07/17/21    Discharge Diagnoses     Nausea and vomiting  Abdominal pain  Possible gastroparesis  Possible cannabis hyperemesis syndrome  Probable GERD   DEREK   CKD  Sinus tachycardia, resolved  Prediabetes  HTN  Hyponatremia, mild  Hypercalcemia, mild  Normocytic anemia      History of Present Illness     Per 7/15 Admit H & P, amended:  Kym Lemus is a pleasant 31 year old woman with history of recurrent nausea, vomiting and abdominal pain - felt in the past to be related to possible diabetic gastroparesis or cannabis hyperemesis syndrome; CKD, and hypertension, who presented to the emergency room for nausea, vomiting, and abdominal pain that had been going on for the past 2 days.      Hospital Course   Kym Lemus was admitted on 7/15/2021.  The following problems were addressed during her hospitalization:    Nausea and vomiting; much improved at time of discharge.  Abdominal pain; improving.  Possible gastroparesis; per previous reports.  Possible cannabis hyperemesis syndrome; per previous reports  Probable GERD; seemed to have good response to H2 blockers this admission.  - some of her Sx were attributed to cannabinoid hyperemesis syndrome  - UA did not show significant WBC  - treated with IV fluids earlier this admission; oral diet gradually advanced  - started on famotidine BID  - mag citrate and miralax were added to help promote more regular BM's; will need follow up with PCP  - concern raised earlier this admission about frequent evaluations for her Sx (per prior notes, has had 7 CT scans, 2 EGDs, 2 abdominal xrays, and a gastric emptying study since 2018 and all were normal)     DEREK; Cr improving, closer to baseline.  CKD; per prior notes, thought to be due in part due to  CT scans/other imaging she has received    - follow up with Nephrology as scheduled  - repeat labs within 7 days, at PCP appt.     Sinus tachycardia; no new Sx and resolved  - likely multifactorial (discomfort, dehydration)  - was monitored on telemetry earlier this admissionn     Prediabetes; most recent A1C in 5/2021 was 6.2%  - diet controlled, per prior notes  - was on ISS this admission  - follow up with PCP     HTN; poorly controlled.  - amlodipine started and gradually increased.  Pressures still elevated, but improving.  - needs close f/u with her PCP and with Nephrology    Hyponatremia, mild  - has corrected    Hypercalcemia, mild; corrected.    Normocytic anemia; stable.  - further review at PCP follow up        MARIBEL Arce MD, [facility]  [facility]ist       Significant Results and Procedures   See below and in EHR    Pending Results   None.    Unresulted Labs Ordered in the Past 30 Days of this Admission     No orders found from 6/15/2021 to 7/16/2021.          Code Status   Full Code       Primary Care Physician   [facility]    Physical Exam   Vitals:    07/15/21 2152 07/17/21 0445   Weight: 71.8 kg (158 lb 3.2 oz) 73 kg (161 lb)     07/17/21 1100 -- 133Abnormal  -- -- 138/114Abnormal  -- 100 % -- None (Room air) -- -- TS   07/17/21 0807 97.9  F (36.6  C) 137Abnormal  -- -- 124/110Abnormal  16 100 % -- --        Constitutional: awake, no apparent distress; standing next to her bed, eating a sandwich with one hand, and other hand over her abdomen.  HEENT: sclerae clear; MM's moist  Respiratory: good a/e bilaterally, no wheezing or rhonchi  Cardiovascular: Regular rate and rhythm, S1, S2 noted; no m/r/g  GI: abdomen flat, + bowel sounds; soft, mild tenderness over Left mid abdomen and epigastric area; non-distended  Skin/Integumen: no rashes, no cyanosis, no jaundice  Musculoskeletal: no edema  Neurologic: follows directions well; no focal deficits     Discharge Disposition    Discharged to home  Condition at discharge: Stable    Consultations This Hospital Stay   None    Time Spent on this Encounter   I, Ty Arce MD, personally saw the patient today and spent greater than 30 minutes discharging this patient.    Discharge Orders      Reason for your hospital stay    You were admitted for evaluation of abdominal pain and other medical issues, and have made good progress.     Follow-up and recommended labs and tests     1. Follow up with primary care provider, [facility], within 7 days to evaluate medication change, for hospital follow- up, and regarding new diagnosis.  The following labs/tests are recommended: BMP, Hgb A1C (or glucose tolerance testing).   2. New patient consult w/ Nephrology in next 2 weeks, as scheduled.  3. Follow up with [facility] in next 2-3 weeks for re-evaluation of recurrent abdominal pain, GERD.     Activity    Your activity upon discharge: activity as tolerated     Diet    Follow this diet upon discharge: Orders Placed This Encounter      Advance Diet as Tolerated: Regular Diet Adult; low-sodium as able.     Discharge Medications   Discharge Medication List as of 7/17/2021  3:22 PM      START taking these medications    Details   amLODIPine (NORVASC) 10 MG tablet Take 1 tablet (10 mg) by mouth daily, Disp-30 tablet, R-0, E-Prescribe      famotidine (PEPCID) 10 MG tablet Take 1 tablet (10 mg) by mouth 2 times daily, Disp-30 tablet, R-1, E-Prescribe         CONTINUE these medications which have NOT CHANGED    Details   latanoprost (XALATAN) 0.005 % ophthalmic solution Place 1 drop into both eyes At Bedtime, Historical      nortriptyline (PAMELOR) 10 MG capsule Take 30 mg by mouth At Bedtime , Historical      OLANZapine (ZYPREXA) 5 MG tablet Take 0.5-1 tablets (2.5-5 mg) by mouth every 8 hours as needed (nausea not controlled by other medications), Disp-15 tablet, R-0, E-Prescribe      ondansetron (ZOFRAN) 8 MG tablet Take  0.5-1 tablets (4-8 mg) by mouth every 8 hours as needed for nausea (may cause headache or constipation), Disp-30 tablet, R-0, Local Print      prochlorperazine (COMPAZINE) 10 MG tablet Take 1 tablet (10 mg) by mouth every 6 hours as needed for nausea or vomiting, Disp-45 tablet, R-1, E-Prescribe           Allergies   Allergies   Allergen Reactions     Metformin Diarrhea     Data   Most Recent 3 CBC's:  Recent Labs   Lab Test 07/16/21  0546 07/15/21  1851 06/12/21  0628   WBC 8.1 11.3* 5.7   HGB 11.1* 13.4 11.2*   MCV 85 84 82    474* 394      Most Recent 3 BMP's:  Recent Labs   Lab Test 07/17/21  0805 07/16/21  2213 07/16/21  1542 07/16/21  0546 07/15/21  1851 06/12/21  0628   NA  --   --   --  134 131* 130*   POTASSIUM  --   --   --  3.6 3.8 3.4   CHLORIDE  --   --   --  103 94 100   CO2  --   --   --  22 25 25   BUN  --   --   --  38* 49* 40*   CR  --   --   --  1.89* 2.90* 1.64*   ANIONGAP  --   --   --  9 12 5   DIMITRI  --   --   --  8.5 10.2* 8.7   * 118* 143* 138* 155* 119*     Most Recent 2 LFT's:  Recent Labs   Lab Test 07/15/21  1851 06/11/21  1513   AST 33 15   ALT 30 24   ALKPHOS 111 107   BILITOTAL 0.8 0.6     Most Recent INR's and Anticoagulation Dosing History:  Anticoagulation Dose History    There is no flowsheet data to display.       Most Recent 3 Troponin's:  Recent Labs   Lab Test 05/13/21  1451   TROPI <0.015     Most Recent Cholesterol Panel:No lab results found.  Most Recent 6 Bacteria Isolates From Any Culture (See EPIC Reports for Culture Details):No lab results found.  Most Recent TSH, T4 and A1c Labs:  Recent Labs   Lab Test 05/13/21  1451   TSH 0.93   A1C 6.2*     Results for orders placed or performed during the hospital encounter of 05/13/21   CT Abdomen Pelvis w/o Contrast    Narrative    EXAM: CT ABDOMEN PELVIS W/O CONTRAST  LOCATION: [facility]  DATE/TIME: 5/13/2021 7:37 PM    INDICATION: Abdominal pain, acute, nonlocalized  COMPARISON: 9/11/2018  TECHNIQUE:  CT scan of the abdomen and pelvis was performed without IV contrast. Multiplanar reformats were obtained. Dose reduction techniques were used.  CONTRAST: None.    FINDINGS:   LOWER CHEST: Normal.    HEPATOBILIARY: Normal.    PANCREAS: Normal.    SPLEEN: Normal.    ADRENAL GLANDS: Normal.    KIDNEYS/BLADDER: Normal.    BOWEL: No obstruction or inflammatory change. Appendix normal.    LYMPH NODES: Normal.    VASCULATURE: Unremarkable.    PELVIC ORGANS: Vaginal collection device, surrounds cervix. No adnexal lesions. Small calcified uterine fibroids.    MUSCULOSKELETAL: No suspicious bony lesions. Significant interval weight loss.      Impression    IMPRESSION:   1.  No etiology for symptoms evident. Nothing obstructive or inflammatory.

## 2021-07-19 ENCOUNTER — PATIENT OUTREACH (OUTPATIENT)
Dept: CARE COORDINATION | Facility: CLINIC | Age: 31
End: 2021-07-19

## 2021-07-19 DIAGNOSIS — Z71.89 OTHER SPECIFIED COUNSELING: ICD-10-CM

## 2021-07-19 NOTE — PROGRESS NOTES
Clinic Care Coordination Contact  [facility]: Post-Discharge Note  SITUATION                                                      Admission:    Admission Date: 07/15/21   Reason for Admission: Essential hypertension  Discharge:   Discharge Date: 07/17/21  Discharge Diagnosis: Essential Hypertension    BACKGROUND                                                      Kym Lemus is a 31 year old -American lady with known history of recurrent issues of nausea, vomiting and abdominal pain felt in the past secondary to underlying diabetic gastroparesis, suspected cannabis hyperemesis syndrome female, CKD, chronic abdominal pain, hypertension, with also concerns for getting fractured care as she has several hospitalizations, ED visits and subspecialty follow-up in multiple different healthcare system as documented on her last discharge summary.  She presented in the emergency room earlier today for nausea, vomiting, abdominal pain that has been going on at least for the past 2 days    ASSESSMENT      Discharge Assessment  How are your symptoms? (Red Flag symptoms escalate to triage hotline per guidelines): Improved  Do you feel your condition is stable enough to be safe at home until your provider visit?: Yes  Does the patient have their discharge instructions? : Yes  Does the patient have questions regarding their discharge instructions? : No  Does the patient have all of their medications?: Yes  Do you have questions regarding any of your medications? : No  Do you have all of your needed medical supplies or equipment (DME)?  (i.e. oxygen tank, CPAP, cane, etc.): Yes  Discharge follow-up appointment scheduled within 14 calendar days? : Yes    Post-op  Did the patient have surgery or a procedure: No  Eating & Drinking: eating and drinking without complaints/concerns  Urinary Status: voiding without complaint/concerns      PLAN                                                      Outpatient Plan:  Follow  up with primary care provider, [facility], within 7 days to evaluate medication  change, for hospital follow- up, and regarding new diagnosis. The following labs/tests are recommended: BMP, Hgb A1C  (or glucose tolerance testing).  2. New patient consult w/ Nephrology in next 2 weeks, as scheduled.  3. Follow up with [facility] in next 2-3 weeks for re-evaluation of recurrent abdominal pain, GERD.      For any urgent concerns, please contact our 24 hour nurse triage line: [phone] ([phone])         Maday Hall RN

## 2021-07-21 LAB
ATRIAL RATE - MUSE: 100 BPM
DIASTOLIC BLOOD PRESSURE - MUSE: NORMAL MMHG
INTERPRETATION ECG - MUSE: NORMAL
P AXIS - MUSE: 73 DEGREES
PR INTERVAL - MUSE: 148 MS
QRS DURATION - MUSE: 78 MS
QT - MUSE: 326 MS
QTC - MUSE: 420 MS
R AXIS - MUSE: 74 DEGREES
SYSTOLIC BLOOD PRESSURE - MUSE: NORMAL MMHG
T AXIS - MUSE: 55 DEGREES
VENTRICULAR RATE- MUSE: 100 BPM

## 2021-08-23 LAB
BASOPHILS # BLD AUTO: 0 10E3/UL (ref 0–0.2)
BASOPHILS NFR BLD AUTO: 0 %
EOSINOPHIL # BLD AUTO: 0 10E3/UL (ref 0–0.7)
EOSINOPHIL NFR BLD AUTO: 0 %
ERYTHROCYTE [DISTWIDTH] IN BLOOD BY AUTOMATED COUNT: 12.2 % (ref 10–15)
HCT VFR BLD AUTO: 41.1 % (ref 35–47)
HGB BLD-MCNC: 14.1 G/DL (ref 11.7–15.7)
IMM GRANULOCYTES # BLD: 0.1 10E3/UL
IMM GRANULOCYTES NFR BLD: 0 %
LYMPHOCYTES # BLD AUTO: 1.4 10E3/UL (ref 0.8–5.3)
LYMPHOCYTES NFR BLD AUTO: 9 %
MCH RBC QN AUTO: 27.9 PG (ref 26.5–33)
MCHC RBC AUTO-ENTMCNC: 34.3 G/DL (ref 31.5–36.5)
MCV RBC AUTO: 81 FL (ref 78–100)
MONOCYTES # BLD AUTO: 1.3 10E3/UL (ref 0–1.3)
MONOCYTES NFR BLD AUTO: 8 %
NEUTROPHILS # BLD AUTO: 12.4 10E3/UL (ref 1.6–8.3)
NEUTROPHILS NFR BLD AUTO: 83 %
NRBC # BLD AUTO: 0 10E3/UL
NRBC BLD AUTO-RTO: 0 /100
PLATELET # BLD AUTO: 492 10E3/UL (ref 150–450)
RBC # BLD AUTO: 5.05 10E6/UL (ref 3.8–5.2)
WBC # BLD AUTO: 15.2 10E3/UL (ref 4–11)

## 2021-08-23 PROCEDURE — 85025 COMPLETE CBC W/AUTO DIFF WBC: CPT | Performed by: EMERGENCY MEDICINE

## 2021-08-23 PROCEDURE — 82248 BILIRUBIN DIRECT: CPT | Performed by: EMERGENCY MEDICINE

## 2021-08-23 PROCEDURE — 83690 ASSAY OF LIPASE: CPT | Performed by: EMERGENCY MEDICINE

## 2021-08-23 PROCEDURE — 36415 COLL VENOUS BLD VENIPUNCTURE: CPT | Performed by: EMERGENCY MEDICINE

## 2021-08-23 PROCEDURE — 80053 COMPREHEN METABOLIC PANEL: CPT | Performed by: EMERGENCY MEDICINE

## 2021-08-23 PROCEDURE — 99285 EMERGENCY DEPT VISIT HI MDM: CPT

## 2021-08-23 PROCEDURE — 84703 CHORIONIC GONADOTROPIN ASSAY: CPT | Performed by: EMERGENCY MEDICINE

## 2021-08-23 PROCEDURE — C9803 HOPD COVID-19 SPEC COLLECT: HCPCS

## 2021-08-23 ASSESSMENT — MIFFLIN-ST. JEOR: SCORE: 1431.64

## 2021-08-24 ENCOUNTER — HOSPITAL ENCOUNTER (INPATIENT)
Facility: CLINIC | Age: 31
LOS: 1 days | Discharge: HOME | DRG: 683 | End: 2021-08-25
Attending: EMERGENCY MEDICINE | Admitting: INTERNAL MEDICINE
Payer: COMMERCIAL

## 2021-08-24 DIAGNOSIS — R31.9 URINARY TRACT INFECTION WITH HEMATURIA, SITE UNSPECIFIED: ICD-10-CM

## 2021-08-24 DIAGNOSIS — N28.9 ACUTE ON CHRONIC RENAL INSUFFICIENCY: ICD-10-CM

## 2021-08-24 DIAGNOSIS — R10.13 ABDOMINAL PAIN, EPIGASTRIC: ICD-10-CM

## 2021-08-24 DIAGNOSIS — E86.0 DEHYDRATION: ICD-10-CM

## 2021-08-24 DIAGNOSIS — F43.25 ADJUSTMENT DISORDER WITH MIXED DISTURBANCE OF EMOTIONS AND CONDUCT: ICD-10-CM

## 2021-08-24 DIAGNOSIS — K31.84 GASTROPARESIS: ICD-10-CM

## 2021-08-24 DIAGNOSIS — N18.9 ACUTE ON CHRONIC RENAL INSUFFICIENCY: ICD-10-CM

## 2021-08-24 DIAGNOSIS — N39.0 URINARY TRACT INFECTION WITH HEMATURIA, SITE UNSPECIFIED: ICD-10-CM

## 2021-08-24 DIAGNOSIS — R11.10 VOMITING, INTRACTABILITY OF VOMITING NOT SPECIFIED, PRESENCE OF NAUSEA NOT SPECIFIED, UNSPECIFIED VOMITING TYPE: Primary | ICD-10-CM

## 2021-08-24 PROBLEM — R79.89 ELEVATED LACTIC ACID LEVEL: Status: ACTIVE | Noted: 2018-09-11

## 2021-08-24 PROBLEM — F12.90 MARIJUANA USE: Status: ACTIVE | Noted: 2019-11-22

## 2021-08-24 PROBLEM — I10 HTN (HYPERTENSION): Status: ACTIVE | Noted: 2018-10-28

## 2021-08-24 LAB
ALBUMIN SERPL-MCNC: 5.4 G/DL (ref 3.4–5)
ALBUMIN UR-MCNC: 70 MG/DL
ALP SERPL-CCNC: 128 U/L (ref 40–150)
ALT SERPL W P-5'-P-CCNC: 24 U/L (ref 0–50)
AMPHETAMINES UR QL SCN: NORMAL
ANION GAP SERPL CALCULATED.3IONS-SCNC: 13 MMOL/L (ref 3–14)
ANION GAP SERPL CALCULATED.3IONS-SCNC: 15 MMOL/L (ref 3–14)
APPEARANCE UR: ABNORMAL
AST SERPL W P-5'-P-CCNC: 25 U/L (ref 0–45)
BACTERIA #/AREA URNS HPF: ABNORMAL /HPF
BARBITURATES UR QL: NORMAL
BENZODIAZ UR QL: NORMAL
BILIRUB DIRECT SERPL-MCNC: <0.1 MG/DL (ref 0–0.2)
BILIRUB SERPL-MCNC: 0.4 MG/DL (ref 0.2–1.3)
BILIRUB UR QL STRIP: NEGATIVE
BUN SERPL-MCNC: 67 MG/DL (ref 7–30)
BUN SERPL-MCNC: 70 MG/DL (ref 7–30)
CALCIUM SERPL-MCNC: 8.5 MG/DL (ref 8.5–10.1)
CALCIUM SERPL-MCNC: 9.2 MG/DL (ref 8.5–10.1)
CANNABINOIDS UR QL SCN: NORMAL
CHLORIDE BLD-SCNC: 92 MMOL/L (ref 94–109)
CHLORIDE BLD-SCNC: 95 MMOL/L (ref 94–109)
CO2 SERPL-SCNC: 24 MMOL/L (ref 20–32)
CO2 SERPL-SCNC: 24 MMOL/L (ref 20–32)
COCAINE UR QL: NORMAL
COLOR UR AUTO: YELLOW
CREAT SERPL-MCNC: 4.09 MG/DL (ref 0.52–1.04)
CREAT SERPL-MCNC: 4.85 MG/DL (ref 0.52–1.04)
GFR SERPL CREATININE-BSD FRML MDRD: 11 ML/MIN/1.73M2
GFR SERPL CREATININE-BSD FRML MDRD: 14 ML/MIN/1.73M2
GLUCOSE BLD-MCNC: 179 MG/DL (ref 70–99)
GLUCOSE BLD-MCNC: 190 MG/DL (ref 70–99)
GLUCOSE BLDC GLUCOMTR-MCNC: 162 MG/DL (ref 70–99)
GLUCOSE BLDC GLUCOMTR-MCNC: 182 MG/DL (ref 70–99)
GLUCOSE BLDC GLUCOMTR-MCNC: 183 MG/DL (ref 70–99)
GLUCOSE UR STRIP-MCNC: NEGATIVE MG/DL
HBA1C MFR BLD: 6.4 % (ref 0–5.6)
HCG SERPL QL: NEGATIVE
HGB UR QL STRIP: ABNORMAL
HOLD SPECIMEN: NORMAL
HOLD SPECIMEN: NORMAL
KETONES BLD-SCNC: 0.3 MMOL/L (ref 0–0.6)
KETONES UR STRIP-MCNC: ABNORMAL MG/DL
LACTATE SERPL-SCNC: 1.7 MMOL/L (ref 0.7–2)
LACTATE SERPL-SCNC: 3.3 MMOL/L (ref 0.7–2)
LEUKOCYTE ESTERASE UR QL STRIP: ABNORMAL
LIPASE SERPL-CCNC: 93 U/L (ref 73–393)
MUCOUS THREADS #/AREA URNS LPF: PRESENT /LPF
NITRATE UR QL: NEGATIVE
OPIATES UR QL SCN: NORMAL
PCP UR QL SCN: NORMAL
PH UR STRIP: 5 [PH] (ref 5–7)
POTASSIUM BLD-SCNC: 3.6 MMOL/L (ref 3.4–5.3)
POTASSIUM BLD-SCNC: 3.7 MMOL/L (ref 3.4–5.3)
PROT SERPL-MCNC: 10.6 G/DL (ref 6.8–8.8)
RBC URINE: 6 /HPF
SARS-COV-2 RNA RESP QL NAA+PROBE: NEGATIVE
SODIUM SERPL-SCNC: 131 MMOL/L (ref 133–144)
SODIUM SERPL-SCNC: 132 MMOL/L (ref 133–144)
SP GR UR STRIP: 1.02 (ref 1–1.03)
SQUAMOUS EPITHELIAL: 3 /HPF
UROBILINOGEN UR STRIP-MCNC: NORMAL MG/DL
WBC URINE: 13 /HPF

## 2021-08-24 PROCEDURE — 258N000003 HC RX IP 258 OP 636: Performed by: INTERNAL MEDICINE

## 2021-08-24 PROCEDURE — 99207 PR APP CREDIT; MD BILLING SHARED VISIT: CPT | Performed by: HOSPITALIST

## 2021-08-24 PROCEDURE — 80307 DRUG TEST PRSMV CHEM ANLYZR: CPT | Performed by: INTERNAL MEDICINE

## 2021-08-24 PROCEDURE — 250N000009 HC RX 250: Performed by: EMERGENCY MEDICINE

## 2021-08-24 PROCEDURE — 99223 1ST HOSP IP/OBS HIGH 75: CPT | Mod: AI | Performed by: INTERNAL MEDICINE

## 2021-08-24 PROCEDURE — 250N000013 HC RX MED GY IP 250 OP 250 PS 637: Performed by: HOSPITALIST

## 2021-08-24 PROCEDURE — 36415 COLL VENOUS BLD VENIPUNCTURE: CPT | Performed by: INTERNAL MEDICINE

## 2021-08-24 PROCEDURE — 80048 BASIC METABOLIC PNL TOTAL CA: CPT | Performed by: INTERNAL MEDICINE

## 2021-08-24 PROCEDURE — 120N000004 HC R&B MS OVERFLOW

## 2021-08-24 PROCEDURE — 81001 URINALYSIS AUTO W/SCOPE: CPT | Performed by: INTERNAL MEDICINE

## 2021-08-24 PROCEDURE — 82010 KETONE BODYS QUAN: CPT | Performed by: EMERGENCY MEDICINE

## 2021-08-24 PROCEDURE — 250N000013 HC RX MED GY IP 250 OP 250 PS 637: Performed by: INTERNAL MEDICINE

## 2021-08-24 PROCEDURE — 87635 SARS-COV-2 COVID-19 AMP PRB: CPT | Performed by: EMERGENCY MEDICINE

## 2021-08-24 PROCEDURE — 250N000009 HC RX 250: Performed by: INTERNAL MEDICINE

## 2021-08-24 PROCEDURE — 83605 ASSAY OF LACTIC ACID: CPT | Performed by: EMERGENCY MEDICINE

## 2021-08-24 PROCEDURE — 96360 HYDRATION IV INFUSION INIT: CPT

## 2021-08-24 PROCEDURE — 96361 HYDRATE IV INFUSION ADD-ON: CPT

## 2021-08-24 PROCEDURE — 250N000013 HC RX MED GY IP 250 OP 250 PS 637: Performed by: EMERGENCY MEDICINE

## 2021-08-24 PROCEDURE — 36415 COLL VENOUS BLD VENIPUNCTURE: CPT | Performed by: EMERGENCY MEDICINE

## 2021-08-24 PROCEDURE — 83036 HEMOGLOBIN GLYCOSYLATED A1C: CPT | Performed by: INTERNAL MEDICINE

## 2021-08-24 PROCEDURE — 258N000003 HC RX IP 258 OP 636: Performed by: EMERGENCY MEDICINE

## 2021-08-24 PROCEDURE — 87086 URINE CULTURE/COLONY COUNT: CPT | Performed by: INTERNAL MEDICINE

## 2021-08-24 PROCEDURE — 250N000011 HC RX IP 250 OP 636: Performed by: EMERGENCY MEDICINE

## 2021-08-24 PROCEDURE — 250N000011 HC RX IP 250 OP 636: Performed by: HOSPITALIST

## 2021-08-24 PROCEDURE — 250N000011 HC RX IP 250 OP 636: Performed by: INTERNAL MEDICINE

## 2021-08-24 RX ORDER — DEXTROSE MONOHYDRATE 25 G/50ML
25-50 INJECTION, SOLUTION INTRAVENOUS
Status: DISCONTINUED | OUTPATIENT
Start: 2021-08-24 | End: 2021-08-25 | Stop reason: HOSPADM

## 2021-08-24 RX ORDER — ACETAMINOPHEN 325 MG/1
650 TABLET ORAL EVERY 6 HOURS PRN
Status: DISCONTINUED | OUTPATIENT
Start: 2021-08-24 | End: 2021-08-25 | Stop reason: HOSPADM

## 2021-08-24 RX ORDER — ONDANSETRON 2 MG/ML
4 INJECTION INTRAMUSCULAR; INTRAVENOUS EVERY 6 HOURS PRN
Status: DISCONTINUED | OUTPATIENT
Start: 2021-08-24 | End: 2021-08-25 | Stop reason: HOSPADM

## 2021-08-24 RX ORDER — HYDRALAZINE HYDROCHLORIDE 20 MG/ML
10 INJECTION INTRAMUSCULAR; INTRAVENOUS EVERY 4 HOURS PRN
Status: DISCONTINUED | OUTPATIENT
Start: 2021-08-24 | End: 2021-08-25 | Stop reason: HOSPADM

## 2021-08-24 RX ORDER — LIDOCAINE 40 MG/G
CREAM TOPICAL
Status: DISCONTINUED | OUTPATIENT
Start: 2021-08-24 | End: 2021-08-25 | Stop reason: HOSPADM

## 2021-08-24 RX ORDER — HALOPERIDOL 5 MG/ML
2.5 INJECTION INTRAMUSCULAR ONCE
Status: COMPLETED | OUTPATIENT
Start: 2021-08-24 | End: 2021-08-24

## 2021-08-24 RX ORDER — HYDROXYZINE HYDROCHLORIDE 25 MG/1
25 TABLET, FILM COATED ORAL EVERY 6 HOURS PRN
Status: DISCONTINUED | OUTPATIENT
Start: 2021-08-24 | End: 2021-08-25

## 2021-08-24 RX ORDER — AMOXICILLIN 250 MG
1 CAPSULE ORAL 2 TIMES DAILY PRN
Status: DISCONTINUED | OUTPATIENT
Start: 2021-08-24 | End: 2021-08-25 | Stop reason: HOSPADM

## 2021-08-24 RX ORDER — AMOXICILLIN 250 MG
2 CAPSULE ORAL 2 TIMES DAILY PRN
Status: DISCONTINUED | OUTPATIENT
Start: 2021-08-24 | End: 2021-08-25 | Stop reason: HOSPADM

## 2021-08-24 RX ORDER — ACETAMINOPHEN 500 MG
1000 TABLET ORAL ONCE
Status: DISCONTINUED | OUTPATIENT
Start: 2021-08-24 | End: 2021-08-25 | Stop reason: HOSPADM

## 2021-08-24 RX ORDER — MAGNESIUM HYDROXIDE/ALUMINUM HYDROXICE/SIMETHICONE 120; 1200; 1200 MG/30ML; MG/30ML; MG/30ML
30 SUSPENSION ORAL EVERY 4 HOURS PRN
Status: DISCONTINUED | OUTPATIENT
Start: 2021-08-24 | End: 2021-08-25 | Stop reason: HOSPADM

## 2021-08-24 RX ORDER — NICOTINE POLACRILEX 4 MG
15-30 LOZENGE BUCCAL
Status: DISCONTINUED | OUTPATIENT
Start: 2021-08-24 | End: 2021-08-25 | Stop reason: HOSPADM

## 2021-08-24 RX ORDER — METOCLOPRAMIDE HYDROCHLORIDE 5 MG/ML
5 INJECTION INTRAMUSCULAR; INTRAVENOUS EVERY 6 HOURS
Status: DISCONTINUED | OUTPATIENT
Start: 2021-08-24 | End: 2021-08-25 | Stop reason: HOSPADM

## 2021-08-24 RX ORDER — OLANZAPINE 2.5 MG/1
2.5-5 TABLET, FILM COATED ORAL EVERY 8 HOURS PRN
Status: DISCONTINUED | OUTPATIENT
Start: 2021-08-24 | End: 2021-08-25 | Stop reason: HOSPADM

## 2021-08-24 RX ORDER — ACETAMINOPHEN 650 MG/1
650 SUPPOSITORY RECTAL EVERY 6 HOURS PRN
Status: DISCONTINUED | OUTPATIENT
Start: 2021-08-24 | End: 2021-08-25 | Stop reason: HOSPADM

## 2021-08-24 RX ORDER — LANOLIN ALCOHOL/MO/W.PET/CERES
3 CREAM (GRAM) TOPICAL
Status: DISCONTINUED | OUTPATIENT
Start: 2021-08-24 | End: 2021-08-25 | Stop reason: HOSPADM

## 2021-08-24 RX ORDER — NORTRIPTYLINE HCL 10 MG
30 CAPSULE ORAL AT BEDTIME
Status: DISCONTINUED | OUTPATIENT
Start: 2021-08-24 | End: 2021-08-25 | Stop reason: HOSPADM

## 2021-08-24 RX ORDER — ONDANSETRON 2 MG/ML
4 INJECTION INTRAMUSCULAR; INTRAVENOUS ONCE
Status: COMPLETED | OUTPATIENT
Start: 2021-08-24 | End: 2021-08-24

## 2021-08-24 RX ORDER — ONDANSETRON 4 MG/1
4 TABLET, ORALLY DISINTEGRATING ORAL EVERY 6 HOURS PRN
Status: DISCONTINUED | OUTPATIENT
Start: 2021-08-24 | End: 2021-08-25 | Stop reason: HOSPADM

## 2021-08-24 RX ORDER — SODIUM CHLORIDE 9 MG/ML
INJECTION, SOLUTION INTRAVENOUS CONTINUOUS
Status: DISCONTINUED | OUTPATIENT
Start: 2021-08-24 | End: 2021-08-25 | Stop reason: HOSPADM

## 2021-08-24 RX ADMIN — METOCLOPRAMIDE HYDROCHLORIDE 5 MG: 5 INJECTION INTRAMUSCULAR; INTRAVENOUS at 22:23

## 2021-08-24 RX ADMIN — SODIUM CHLORIDE 500 ML: 9 INJECTION, SOLUTION INTRAVENOUS at 22:28

## 2021-08-24 RX ADMIN — OLANZAPINE 5 MG: 2.5 TABLET, FILM COATED ORAL at 19:34

## 2021-08-24 RX ADMIN — FAMOTIDINE 20 MG: 10 INJECTION INTRAVENOUS at 22:20

## 2021-08-24 RX ADMIN — METOCLOPRAMIDE HYDROCHLORIDE 5 MG: 5 INJECTION INTRAMUSCULAR; INTRAVENOUS at 09:29

## 2021-08-24 RX ADMIN — SODIUM CHLORIDE, POTASSIUM CHLORIDE, SODIUM LACTATE AND CALCIUM CHLORIDE 1000 ML: 600; 310; 30; 20 INJECTION, SOLUTION INTRAVENOUS at 00:37

## 2021-08-24 RX ADMIN — LIDOCAINE HYDROCHLORIDE 30 ML: 20 SOLUTION ORAL; TOPICAL at 01:08

## 2021-08-24 RX ADMIN — ONDANSETRON 4 MG: 2 INJECTION INTRAMUSCULAR; INTRAVENOUS at 01:08

## 2021-08-24 RX ADMIN — OLANZAPINE 5 MG: 2.5 TABLET, FILM COATED ORAL at 09:31

## 2021-08-24 RX ADMIN — ACETAMINOPHEN 650 MG: 325 TABLET, FILM COATED ORAL at 19:29

## 2021-08-24 RX ADMIN — SODIUM CHLORIDE: 9 INJECTION, SOLUTION INTRAVENOUS at 17:04

## 2021-08-24 RX ADMIN — SODIUM CHLORIDE: 9 INJECTION, SOLUTION INTRAVENOUS at 03:45

## 2021-08-24 RX ADMIN — HALOPERIDOL LACTATE 2.5 MG: 5 INJECTION, SOLUTION INTRAMUSCULAR at 01:08

## 2021-08-24 RX ADMIN — HYDRALAZINE HYDROCHLORIDE 10 MG: 20 INJECTION INTRAMUSCULAR; INTRAVENOUS at 19:24

## 2021-08-24 RX ADMIN — ACETAMINOPHEN 650 MG: 325 TABLET, FILM COATED ORAL at 09:32

## 2021-08-24 RX ADMIN — FAMOTIDINE 20 MG: 10 INJECTION, SOLUTION INTRAVENOUS at 01:07

## 2021-08-24 RX ADMIN — NORTRIPTYLINE HYDROCHLORIDE 30 MG: 10 CAPSULE ORAL at 22:19

## 2021-08-24 ASSESSMENT — MIFFLIN-ST. JEOR: SCORE: 1436.62

## 2021-08-24 ASSESSMENT — ENCOUNTER SYMPTOMS
ABDOMINAL PAIN: 1
NAUSEA: 1
FEVER: 0

## 2021-08-24 NOTE — H&P
History and Physical     Kym Lemus MRN# [id number]   YOB: 1990 Age: 31 year old      Date of Admission:  8/24/2021  Date of Service  8/24/21    Primary care provider: [place], [facility]          Assessment and Plan:     Summary of Stay: Kym Lemus is a 31 year old female with a history of T2dm not currently on medications complicated by gastroparesis, as well as cannabinoid hyperemesis syndrome with exacerbations complicated by DEREK, chronic gastritis admitted on 8/24/2021 with 2 days of n/v without diarrhea or abdominal pain with ER evaluation notable for DEREK.     This is her 4th admission here since 5/24 for same.     She states she had abrupt onset of n/v and has not been able to keep down any food/fluid for 2 days.  No f/c/s, no cp or sob, no diarrhea.  No etiology for trigger.  Reports diabetes is under good control with diet.     ER notable for significant tachycardia in the 140's, BPs ok, Labs with DEREK with bun/creat67/4.85, lactic acid 3.3 with a bicarb of 24 and AG of 15.  .  Ketones negative. And CBC with leukocytosis of 15 with a neutrophilia.       Problem List:   Intractable n/v  Cyclic n/v related to edible marijuana use vs gastroparesis vs gastritis vs all 3  -IVF/anti nausea medications  -reglan tid  -IV famotidine for now    DEREK with evolving CKD  No urge to urinate at all. Her last normal creat was in 9/2020 at 0.9.  Subsequent creats in the OP setting settling around 1.5.  Presenting her with DEREK and bun/creat 67/4.85 so seems prerenal with some baseline renal dysfunction vs a component of ATN.  In 7/2021 she was seen by nephrology and looks to have been evaluated for autoimmune processes and only found to have elevated ROBIN 1:160 prompting porphyrin and extractable nuclear antigen testing both of which came back wnl .  Otherwise her DS DNA/free light chains/C4/C3/neutrophil cytoplasmic ab all negative or wnl.  -IVF , I/O's   -bladder  scan to ensure no retention   -check UA for casts or protein or blood   -If renal function not significantly improved in am would ask for Renal input      T2dm not on any medications.  hgb a1c one month ago 6.5.  BG here 190  -ISS    ? htn she has been prescribed amlodipine but dose not take it as she states that her BPs are only elevated when she's in the hospital with an acute flare of her h/v    COVID swab negative  -Covid vaccinated, s/p 2nf pfizer shot in 4/2021    DVT Prophylaxis: Pneumatic Compression Devices and Ambulate every shift  Code Status: Full Code  Functional Status:  Gallagher:  Access:                Chief Complaint:     n/v       History of Present Illness:   Kym Lemus is a 31 year old female with a history of T2dm not currently on medications complicated by gastroparesis, as well as cannabinoid hyperemesis syndrome with exacerbations complicated by DERKE, chronic gastritis admitted on 8/24/2021 with 2 days of n/v without diarrhea or abdominal pain with ER evaluation notable for DEREK.     This is her 4th admission here since 5/24 for same.     She states she had abrupt onset of n/v and has not been able to keep down any food/fluid for 2 days.  No f/c/s, no cp or sob, no diarrhea.  No etiology for trigger.  Reports diabetes is under good control with diet.     ER notable for significant tachycardia in the 140's, BPs ok, Labs with DEREK with bun/creat67/4.85, lactic acid 3.3 with a bicarb of 24 and AG of 15.  .  Ketones negative. And CBC with leukocytosis of 15 with a neutrophilia.       The history is obtained in discussion with the ER provider Dr Edgar and the patient with limited reliability 2/2 sedation            Past Medical History:     Past Medical History:   Diagnosis Date     Benign essential hypertension      Cannabinoid hyperemesis syndrome      Cervical intraepithelial glandular neoplasia     s/p LEEP     Diabetes mellitus type 2, diet-controlled (H)      Gastritis       "Gastroparesis     and canabinoid hyperemesis cb DEREK             Past Surgical History:     Past Surgical History:   Procedure Laterality Date     LEEP TX, CERVICAL      EYAL !!             Social History:     Social History     Tobacco Use     Smoking status: Never Smoker     Smokeless tobacco: Never Used   Substance Use Topics     Alcohol use: Not Currently             Family History:     Family History   Problem Relation Age of Onset     Hypertension Mother      Diabetes Type 2  Mother      Cataracts Mother             Allergies:     Allergies   Allergen Reactions     Metformin Diarrhea             Medications:     Await med rec but patient reports no chronic home medications          Review of Systems:     A Comprehensive greater than 10 system review of systems was carried out.  Pertinent positives and negatives are noted above.  Otherwise negative for contributory information.           Physical Exam:   Blood pressure (!) 155/97, pulse (!) 144, temperature 98.2  F (36.8  C), temperature source Oral, resp. rate 22, height 1.651 m (5' 5\"), weight 72.6 kg (160 lb), last menstrual period 08/17/2021, SpO2 99 %, not currently breastfeeding.  Exam:    General:  Pleasant although quite sleepy after multiple anti nausea medications given in Ed  HEENT:  Head nc/at sclera clear PERRL O/P:  Mask in place Neck is supple  Lungs: cta b nl effort   CV:  RRR no m/r/g no le edema  Abd:  S/nt/nd no r/g  Neuro:  Cn 2-12 grossly intact and cartwright  Alert and oriented affect appropriate   Skin:  W/d no c/c               Data:          Lab Results   Component Value Date     08/23/2021     06/12/2021    Lab Results   Component Value Date    CHLORIDE 92 08/23/2021    CHLORIDE 100 06/12/2021    Lab Results   Component Value Date    BUN 67 08/23/2021    BUN 40 06/12/2021      Lab Results   Component Value Date    POTASSIUM 3.7 08/23/2021    POTASSIUM 3.4 06/12/2021    Lab Results   Component Value Date    CO2 24 08/23/2021    CO2 " 25 06/12/2021    Lab Results   Component Value Date    CR 4.85 08/23/2021    CR 1.64 06/12/2021        Lab Results   Component Value Date    WBC 15.2 (H) 08/23/2021    HGB 14.1 08/23/2021    HCT 41.1 08/23/2021    MCV 81 08/23/2021     (H) 08/23/2021     Lab Results   Component Value Date     (H) 08/23/2021     Lab Results   Component Value Date    AST 25 08/23/2021    ALT 24 08/23/2021    ALKPHOS 128 08/23/2021    BILITOTAL 0.4 08/23/2021            Imaging:   No results found for this or any previous visit (from the past 24 hour(s)).

## 2021-08-24 NOTE — ED NOTES
Pt does not take any medication for diabetes.  With the assistance of her primary care physician, she has been managing her pre-diabetes with diet adjustments.      However, she does take amlodipine for blood pressure.

## 2021-08-24 NOTE — PHARMACY-ADMISSION MEDICATION HISTORY
Admission medication history interview status for this patient is complete. See [facility] admission navigator for allergy information, prior to admission medications and immunization status.     Medication history interview done, indicate source(s): Patient  Medication history resources (including written lists, pill bottles, clinic record):None  Pharmacy: [address] in [place].    Changes made to PTA medication list:  Added: none  Changed: none  Reported as Not Taking: pepcid, zyprexa, zofran, compazine  Removed: pepcid, zyprexa, zofran, compazine    Actions taken by pharmacist (provider contacted, etc):None     Additional medication history information:None    Medication reconciliation/reorder completed by provider prior to medication history?  no   (Y/N)     For patients on insulin therapy: no    Prior to Admission medications    Medication Sig Last Dose Taking? Auth Provider   amLODIPine (NORVASC) 10 MG tablet Take 1 tablet (10 mg) by mouth daily couple days ago Yes Ty Arce MD   latanoprost (XALATAN) 0.005 % ophthalmic solution Place 1 drop into both eyes At Bedtime couple days ago Yes Unknown, Entered By History   nortriptyline (PAMELOR) 10 MG capsule Take 30 mg by mouth At Bedtime  8/22/2021 Yes Reported, Patient

## 2021-08-24 NOTE — ED NOTES
[facility]  ED Nurse Handoff Report    Kym Lemus is a 31 year old female   ED Chief complaint: Abdominal Pain  . ED Diagnosis:   Final diagnoses:   Gastroparesis   Abdominal pain, epigastric   Acute on chronic renal insufficiency   Dehydration     Allergies:   Allergies   Allergen Reactions     Metformin Diarrhea       Code Status: Full Code  Activity level - Baseline/Home:  Independent. Activity Level - Current:   Stand by Assist. Lift room needed: No. Bariatric: No   Needed: No   Isolation: No. Infection: Not Applicable.     Vital Signs:   Vitals:    08/24/21 0115 08/24/21 0130 08/24/21 0145 08/24/21 0200   BP: (!) 155/97      Pulse:       Resp:       Temp:       TempSrc:       SpO2:  100% 99% 99%   Weight:       Height:           Cardiac Rhythm:  ,      Pain level:    Patient confused: No. Patient Falls Risk: No.   Elimination Status: Has voided   Patient Report - Initial Complaint: Pt presents for evaluation of emesis for the last 2-3 days with associated left sided abdominal pain. Pt diaphoretic during triage. Denies any urinary or bowel issues. . Focused Assessment:   01:42 Gastrointestinal Gastrointestinal - Gastrointestinal WDL: .WDL except (C/o diffuse cramping abdominal pain all day 8/23 that progressively got worse. Pt states it is difficult to use the restroom. ) All Quadrants Abdominal Palpation: guarding       Tests Performed: Labs and Imaging. Abnormal Results:   Labs Ordered and Resulted from Time of ED Arrival Up to the Time of Departure from the ED   BASIC METABOLIC PANEL - Abnormal; Notable for the following components:       Result Value    Sodium 131 (*)     Chloride 92 (*)     Anion Gap 15 (*)     Urea Nitrogen 67 (*)     Creatinine 4.85 (*)     Glucose 190 (*)     GFR Estimate 11 (*)     All other components within normal limits   HEPATIC FUNCTION PANEL - Abnormal; Notable for the following components:    Protein Total 10.6 (*)     Albumin 5.4 (*)     All other  components within normal limits   CBC WITH PLATELETS AND DIFFERENTIAL - Abnormal; Notable for the following components:    WBC Count 15.2 (*)     Platelet Count 492 (*)     Absolute Neutrophils 12.4 (*)     Absolute Immature Granulocytes 0.1 (*)     All other components within normal limits   LACTIC ACID WHOLE BLOOD - Abnormal; Notable for the following components:    Lactic Acid 3.3 (*)     All other components within normal limits   LIPASE - Normal   HCG QUALITATIVE PREGNANCY - Normal   KETONE BETA-HYDROXYBUTYRATE QUANTITATIVE, RAPID - Normal   CBC WITH PLATELETS & DIFFERENTIAL    Narrative:     The following orders were created for panel order CBC with Platelets & Differential.  Procedure                               Abnormality         Status                     ---------                               -----------         ------                     CBC with platelets and d...[[id number]]  Abnormal            Final result                 Please view results for these tests on the individual orders.   EXTRA BLUE TOP TUBE   EXTRA RED TOP TUBE   COVID-19 VIRUS (CORONAVIRUS) BY PCR   PERIPHERAL IV CATHETER   EXTRA TUBE    Narrative:     The following orders were created for panel order Extra Tube ([facility] Draw).  Procedure                               Abnormality         Status                     ---------                               -----------         ------                     Extra Blue Top Tube[[id number]]                              Final result               Extra Red Top Tube[[id number]]                               Final result                 Please view results for these tests on the individual orders.     No orders to display   .   Treatments provided: See MAR  Family Comments: N/A  OBS brochure/video discussed/provided to patient:  yes  ED Medications:   Medications   lactated ringers BOLUS 1,000 mL (1,000 mLs Intravenous New Bag 8/24/21 0037)   famotidine (PEPCID) injection 20 mg (20 mg Intravenous  Given 8/24/21 0107)   haloperidol lactate (HALDOL) injection 2.5 mg (2.5 mg Intravenous Given 8/24/21 0108)   ondansetron (ZOFRAN) injection 4 mg (4 mg Intravenous Given 8/24/21 0108)   lidocaine (XYLOCAINE) 2 % 15 mL, alum & mag hydroxide-simethicone (MAALOX) 15 mL GI Cocktail (30 mLs Oral Given 8/24/21 0108)     Drips infusing:  No  For the majority of the shift, the patient's behavior Green. Interventions performed were N/A.    Sepsis treatment initiated: No     Patient tested for COVID 19 prior to admission: YES    ED Nurse Name/Phone Number: Sally Harkins RN,   3:11 AM    RECEIVING UNIT ED HANDOFF REVIEW    Above ED Nurse Handoff Report was reviewed: Yes  Reviewed by: Melia Santiago RN on August 24, 2021 at 11:59 AM

## 2021-08-24 NOTE — PROGRESS NOTES
H&P reviewed from my colleague Dr. Murcia from earlier this AM.  Patient seen and evaluated.  31-year-old female with a history of type 2 diabetes mellitus with gastroparesis and cannabinoid hyperemesis syndrome who comes in with recurrent nausea and vomiting with DEREK.  She describes 2 days of nausea and vomiting and abdominal pain.    Patient is notably tachycardic in the one hundreds to one twenties.  She is afebrile and hypertensive.  Labs notable for DEREK with creatinine of 4.85.  Lactic acid initially elevated 3.3 that improved with IV fluid.    Patient notes that she feels better with treatment compared to when she came into the ER.  Less nausea and tolerating some p.o. intake.  She does have some abdominal pain but improved.  Denies any chest pain or shortness of breath.  She denies using any marijuana since February 2021.    -IV fluid, as needed antiemetics.  Recheck BMP in the a.m. for DEREK.  As needed hydralazine for sustained severe hypertension.  Patient has follow-up with GI in about 3 to 4 weeks.    Addendum: I was paged by nursing that patient was insisting on leaving AMA. I went to the bedside. Boyfriend was at bedside. Patient standing at the edge of the bed. Patient's boyfriend expressed frustration over her repeated admissions and unclear answers in terms of etiology of recurrent nausea and vomiting. We reviewed multiple scans that have been done previously. I instructed the patient and her boyfriend that her kidney function is severely down and that she were to leave AGAINST MEDICAL ADVICE is possible that her kidney function would continue to get worse and she could even go into renal failure. If it became so severe she could even die as a result. They did voice understanding. At this point, they are willing to stay in the hospital for IV fluids. If they do choose to leave then they would be AGAINST MEDICAL ADVICE.    Jerald Todd MD

## 2021-08-24 NOTE — PLAN OF CARE
Patient is alert and oriented x4. Elevated BP and HR. PRN BP medications available. Denies pain. Denies nausea. Tolerated Regular diet. Cr 4.09, NS running at 150 mL/hr. BS checks with meals. No insulin given. Up ind in room. Patient still deciding if she wants to leave AMA or stay the night. RN educated patient on the importance of staying for hydration overnight. Will report off to night shift nurse.

## 2021-08-24 NOTE — PROGRESS NOTES
Patient is alert and oriented x4. Patient voiced she would like to go home this evening. RN educated patient on DEREK and that her Cr is 4.09 and the risk of going home AMA. Patient given AMA form to look over. Patient had decided she would like to still go home tonight. Patient agreed to try a regular diet before discharging this evening. MD notified.

## 2021-08-24 NOTE — ED PROVIDER NOTES
History   Chief Complaint:  Abdominal Pain     The history is provided by the patient.      Kym Lemus is a 31 year old female with history of diabetes, hypertension, and acute kidney injury, in the setting of recurrent episodes of abdominal pain and intractable nausea and vomiting who presents with recurrent episode of nausea/vomiting and abdominal pain. The patient reports that she has had cramping pain across her entire abdomen since Saturday. She was admitted to [facility] on 07/15/21 for abdominal pain but says she has more throbbing pain this time. It is otherwise similar to her prior episodes.  She also notes that the pain spans from her upper abdomen to lower abdomen, which was not the case in July. Since that discharge, she had been feeling fine until Saturday when the pain flared back up. She is unsure what triggers the pain. Her last urination was about four hours ago, and her last bowel movement was on Friday, which she describes as normal. She denies any fever but does endorse nausea and repeated vomiting. She has been unable to eat or drink. She denies any use of alcohol, tobacco, or marijuana and has no history of abdominal surgeries. She denies any other symptoms.      Review of Systems   Constitutional: Negative for fever.   Gastrointestinal: Positive for abdominal pain and nausea.   All other systems reviewed and are negative.      Allergies:  Metformin    Medications:  Effexor  Potassium chloride  Protonix  Pamelor  Norvasc    Past Medical History:    Cannabinoid hyperemesis syndrome  Diabetes  Acute kidney injury  Hypokalemia  Hyponatremia  Hypertension  Adjustment disorder    Past Surgical History:    The patient denies past surgical history.     Family History:    Cataract, mother  Diabetes, mother  Glaucoma, mother  Hypertension, mother    Social History:  Smoking status: never smoker  Alcohol use: no  PCP: Triny Argueta  Presents to the ED with significant  "other.    Physical Exam     Patient Vitals for the past 24 hrs:   BP Temp Temp src Pulse Resp SpO2 Height Weight   08/24/21 0200 -- -- -- -- -- 99 % -- --   08/24/21 0145 -- -- -- -- -- 99 % -- --   08/24/21 0130 -- -- -- -- -- 100 % -- --   08/24/21 0115 (!) 155/97 -- -- -- -- -- -- --   08/23/21 2248 (!) 138/105 98.2  F (36.8  C) Oral (!) 144 22 100 % 1.651 m (5' 5\") 72.6 kg (160 lb)       Physical Exam  General: Uncomfortable, constantly moving on ED stretcher. Nontoxic.  Head:  Scalp, face, and head appear normal  Eyes:  Pupils are equal, round    Conjunctivae non-injected and sclerae white  ENT:    The external nose is normal    Pinnae are normal  Neck:  Normal range of motion    There is no rigidity noted    Trachea is in the midline  CV:  Regular rate and rhythm     Normal S1/S2, no S3/S4    No murmur or rub. Radial pulses 2+ bilaterally.  Resp:  Lungs are clear and equal bilaterally  There is no tachypnea    No increased work of breathing    No rales, wheezing, or rhonchi  GI:  Abdomen is soft, no rigidity or guarding    No distension, or mass    Diffuse epigastric and upper abdominal tenderness. No rebound tenderness   MS:  Normal muscular tone    Symmetric motor strength    No lower extremity edema  Skin:  No rash or acute skin lesions noted  Neuro: Awake and alert  Speech is normal and fluent  Moves all extremities spontaneously  Psych:  Anxious affect. Appropriate interactions.    Emergency Department Course     Laboratory:  CBC: WBC 15.2(H), HGB 14.1, (H)   BMP: Glucose 190(H), Sodium 131(L), Chloride 92(L), Anion Gap 15(H), Urea Nitrogen 67(H), GFR 11(L), o/w WNL (Creatinine: 4.85(H))    Hepatic Panel: Albumin: 5.4(H), Protein Total: 10.6(H), o/w Negative  Lactic acid (Resulted 0049): 3.3(H)  Lipase: 93  HCG Qualitative Blood: Negative  Ketone Beta-Hydroxybutyrate: 0.3  Asymptomatic COVID-19 PCR: Pending      Emergency Department Course:  Reviewed:  I reviewed the patient's nursing notes, " vitals, past medical records, Care Everywhere.     Assessments:  ED Course as of Aug 24 0326   Tue Aug 24, 2021   0037 I performed an assessment and examination of the patient as noted above.      0214 Findings and plan explained to the Patient who consents to admission. Discussed the patient with Dr. Murcia, who will admit the patient to an inpatient bed for further monitoring, evaluation, and treatment.        Interventions:  0037 LR 1L IV Bolus   0107 Pepcid 20 mg, IV  0108 Haldol 2.5 mg, IV  0108 Zofran 4 mg, IV  0108 GI Cocktail (Maalox/Mylanta and viscous Lidocaine), 30 mL suspension, PO      Disposition:  The patient was admitted to the hospital under the care of Dr. Murcia.     Impression & Plan   Medical Decision Making:  Kym Lemus is a 31 year old female with history of recurrent episodes of abdominal pain associated with nausea and vomiting in the setting of type 2 diabetes, gastroparesis with most recent EGD in June 2020 showing a small hiatal hernia with grade D esophagitis and a moderate amount of fluid within the gastric body consistent with diabetic gastroparesis.  A subsequent nuclear medicine gastric emptying scan in February 2021 was normal.  Most recent CT scan of the abdomen pelvis was in June 2021 which was reviewed by myself in care everywhere and showed evidence possibly consistent with bilateral medullary nephrocalcinosis, a large amount of fecal matter consistent with possible constipation but no evidence of infection, bowel obstruction or other acute intra-abdominal process.  On my evaluation today she appears uncomfortable but otherwise nontoxic and hemodynamically stable.  Her abdomen is tender but soft without evidence of peritonitis or acute surgical emergency.  Overall her presentation is consistent with prior episodes of exacerbation of her underlying gastroparesis.  She was treated with famotidine, Zofran, adjunctive Haldol, and GI cocktail and felt improved.  She was clinically  dehydrated and was treated with IV fluids.  Chemistry revealed acute on chronic renal insufficiency with creatinine of 4.85 and BUN of 67 which is the worst her kidney function has been in the last several years.  No severe electrolyte derangements.  Moderate leukocytosis likely due to demargination in the setting of repeated vomiting.  No other clinical signs or symptoms to suggest acute infectious process.  Initial lactic acid elevated due to dehydration and vomiting. Given her renal dysfunction, intractable symptoms, patient will require admission for ongoing monitoring evaluation and treatment. The case was discussed with Dr. Murcia, hospitalist who accepted the patient for admission. The patient was boarded in the ED overnight until inpatient bed available.      Covid-19  Kym Lemus was evaluated during a global COVID-19 pandemic, which necessitated consideration that the patient might be at risk for infection with the SARS-CoV-2 virus that causes COVID-19.   Applicable protocols for evaluation were followed during the patient's care.   COVID-19 was considered as part of the patient's evaluation. The plan for testing is:  a test was obtained during this visit.    The Lactic acid level is elevated due to vomiting, dehydration, at this time there is no sign of severe sepsis or septic shock.       Diagnosis:    ICD-10-CM    1. Gastroparesis  K31.84    2. Abdominal pain, epigastric  R10.13    3. Acute on chronic renal insufficiency  N28.9     N18.9    4. Dehydration  E86.0      Scribe Disclosure:  Lillian OGDEN, am serving as a scribe at 12:30 AM on 8/24/2021 to document services personally performed by Jelani Edgar MD based on my observations and the provider's statements to me.       Jelani Edgar MD  08/24/21 4029

## 2021-08-24 NOTE — ED TRIAGE NOTES
Pt presents for evaluation of emesis for the last 2-3 days with associated left sided abdominal pain. Pt diaphoretic during triage. Denies any urinary or bowel issues.

## 2021-08-24 NOTE — ED NOTES
Patient declined oral tylenol at this time. Patient will notify RN if/ when she changes her mind.

## 2021-08-25 VITALS
TEMPERATURE: 98.5 F | DIASTOLIC BLOOD PRESSURE: 61 MMHG | BODY MASS INDEX: 26.84 KG/M2 | OXYGEN SATURATION: 99 % | WEIGHT: 161.1 LBS | RESPIRATION RATE: 18 BRPM | SYSTOLIC BLOOD PRESSURE: 125 MMHG | HEART RATE: 144 BPM | HEIGHT: 65 IN

## 2021-08-25 LAB
ALBUMIN SERPL-MCNC: 4.2 G/DL (ref 3.4–5)
ALP SERPL-CCNC: 98 U/L (ref 40–150)
ALT SERPL W P-5'-P-CCNC: 24 U/L (ref 0–50)
ANION GAP SERPL CALCULATED.3IONS-SCNC: 9 MMOL/L (ref 3–14)
AST SERPL W P-5'-P-CCNC: 29 U/L (ref 0–45)
BACTERIA UR CULT: NORMAL
BILIRUB SERPL-MCNC: 0.6 MG/DL (ref 0.2–1.3)
BUN SERPL-MCNC: 38 MG/DL (ref 7–30)
CALCIUM SERPL-MCNC: 8.6 MG/DL (ref 8.5–10.1)
CHLORIDE BLD-SCNC: 105 MMOL/L (ref 94–109)
CO2 SERPL-SCNC: 22 MMOL/L (ref 20–32)
CREAT SERPL-MCNC: 1.98 MG/DL (ref 0.52–1.04)
ERYTHROCYTE [DISTWIDTH] IN BLOOD BY AUTOMATED COUNT: 12 % (ref 10–15)
GFR SERPL CREATININE-BSD FRML MDRD: 32 ML/MIN/1.73M2
GLUCOSE BLD-MCNC: 138 MG/DL (ref 70–99)
GLUCOSE BLDC GLUCOMTR-MCNC: 136 MG/DL (ref 70–99)
GLUCOSE BLDC GLUCOMTR-MCNC: 146 MG/DL (ref 70–99)
GLUCOSE BLDC GLUCOMTR-MCNC: 151 MG/DL (ref 70–99)
HCT VFR BLD AUTO: 34.3 % (ref 35–47)
HGB BLD-MCNC: 11.5 G/DL (ref 11.7–15.7)
MCH RBC QN AUTO: 28 PG (ref 26.5–33)
MCHC RBC AUTO-ENTMCNC: 33.5 G/DL (ref 31.5–36.5)
MCV RBC AUTO: 84 FL (ref 78–100)
PLATELET # BLD AUTO: 414 10E3/UL (ref 150–450)
POTASSIUM BLD-SCNC: 3.7 MMOL/L (ref 3.4–5.3)
PROT SERPL-MCNC: 8.3 G/DL (ref 6.8–8.8)
RBC # BLD AUTO: 4.1 10E6/UL (ref 3.8–5.2)
SODIUM SERPL-SCNC: 136 MMOL/L (ref 133–144)
WBC # BLD AUTO: 11.9 10E3/UL (ref 4–11)

## 2021-08-25 PROCEDURE — 250N000011 HC RX IP 250 OP 636: Performed by: INTERNAL MEDICINE

## 2021-08-25 PROCEDURE — 258N000003 HC RX IP 258 OP 636: Performed by: INTERNAL MEDICINE

## 2021-08-25 PROCEDURE — 82040 ASSAY OF SERUM ALBUMIN: CPT | Performed by: NURSE PRACTITIONER

## 2021-08-25 PROCEDURE — 36415 COLL VENOUS BLD VENIPUNCTURE: CPT | Performed by: NURSE PRACTITIONER

## 2021-08-25 PROCEDURE — 85027 COMPLETE CBC AUTOMATED: CPT | Performed by: NURSE PRACTITIONER

## 2021-08-25 PROCEDURE — 250N000013 HC RX MED GY IP 250 OP 250 PS 637: Performed by: NURSE PRACTITIONER

## 2021-08-25 PROCEDURE — 99239 HOSP IP/OBS DSCHRG MGMT >30: CPT | Performed by: NURSE PRACTITIONER

## 2021-08-25 RX ORDER — HYDROXYZINE HYDROCHLORIDE 25 MG/1
25 TABLET, FILM COATED ORAL EVERY 6 HOURS PRN
Qty: 60 TABLET | Refills: 1 | Status: ON HOLD | OUTPATIENT
Start: 2021-08-25 | End: 2021-10-07

## 2021-08-25 RX ORDER — HYDROXYZINE HYDROCHLORIDE 25 MG/1
25 TABLET, FILM COATED ORAL EVERY 6 HOURS PRN
Status: DISCONTINUED | OUTPATIENT
Start: 2021-08-25 | End: 2021-08-25 | Stop reason: HOSPADM

## 2021-08-25 RX ORDER — PROCHLORPERAZINE MALEATE 10 MG
10 TABLET ORAL EVERY 6 HOURS PRN
Qty: 10 TABLET | Refills: 0 | Status: ON HOLD | OUTPATIENT
Start: 2021-08-25 | End: 2021-10-07

## 2021-08-25 RX ORDER — VENLAFAXINE HYDROCHLORIDE 37.5 MG/1
37.5 TABLET, EXTENDED RELEASE ORAL
Qty: 30 TABLET | Refills: 0 | Status: SHIPPED | OUTPATIENT
Start: 2021-08-25

## 2021-08-25 RX ORDER — VENLAFAXINE HYDROCHLORIDE 37.5 MG/1
37.5 CAPSULE, EXTENDED RELEASE ORAL
Status: DISCONTINUED | OUTPATIENT
Start: 2021-08-25 | End: 2021-08-25 | Stop reason: HOSPADM

## 2021-08-25 RX ORDER — SULFAMETHOXAZOLE/TRIMETHOPRIM 800-160 MG
1 TABLET ORAL 2 TIMES DAILY
Qty: 10 TABLET | Refills: 0 | Status: ON HOLD | OUTPATIENT
Start: 2021-08-25 | End: 2021-08-29

## 2021-08-25 RX ORDER — VENLAFAXINE HYDROCHLORIDE 37.5 MG/1
37.5 TABLET, EXTENDED RELEASE ORAL
Status: DISCONTINUED | OUTPATIENT
Start: 2021-08-25 | End: 2021-08-25 | Stop reason: ALTCHOICE

## 2021-08-25 RX ORDER — SULFAMETHOXAZOLE/TRIMETHOPRIM 800-160 MG
1 TABLET ORAL 2 TIMES DAILY
Status: DISCONTINUED | OUTPATIENT
Start: 2021-08-25 | End: 2021-08-25 | Stop reason: HOSPADM

## 2021-08-25 RX ADMIN — SODIUM CHLORIDE: 9 INJECTION, SOLUTION INTRAVENOUS at 02:46

## 2021-08-25 RX ADMIN — METOCLOPRAMIDE HYDROCHLORIDE 5 MG: 5 INJECTION INTRAMUSCULAR; INTRAVENOUS at 03:47

## 2021-08-25 RX ADMIN — VENLAFAXINE HYDROCHLORIDE 37.5 MG: 37.5 CAPSULE, EXTENDED RELEASE ORAL at 10:28

## 2021-08-25 RX ADMIN — METOCLOPRAMIDE HYDROCHLORIDE 5 MG: 5 INJECTION INTRAMUSCULAR; INTRAVENOUS at 10:24

## 2021-08-25 NOTE — CONSULTS
Care Management Discharge Note    Discharge Date: 08/25/2021     Discharge Disposition:  Home    Discharge Services:  Outpt PCP and Nephrology Follow up    Discharge Transportation:  Family or Friend    Private pay costs discussed: Not applicable    Additional Information:  Pt has been scheduled for a PCP post hospitalization follow up appointment next Wednesday 9/1 at 11:20am at [facility] with Dr. Benson. Discharge AVS updated.     DEJUAN Martínez

## 2021-08-25 NOTE — DISCHARGE SUMMARY
"[facility]  Hospitalist Discharge Summary      Date of Admission:  8/24/2021  Date of Discharge:  8/25/2021  Discharging Provider: JOSE Correa CNP      Discharge Diagnoses   Acute kidney injury in the setting of CKD stage III  SIRS  Intractable nausea and vomiting   T2DM   HTN  Glaucoma  Adjustment disorder  Abnormal UA -- asymptomatic bacturia vs evolving UTI. Urine culture pending.      Follow-ups Needed After Discharge       Unresulted Labs Ordered in the Past 30 Days of this Admission     Date and Time Order Name Status Description    8/24/2021  5:31 AM Urine Culture In process       These results will be followed up by PCP    Discharge Disposition   Discharged to home  Condition at discharge: Stable      Hospital Course   \"Kym Lemus is a 31 year old female with a history of T2dm not currently on medications complicated by gastroparesis, as well as cannabinoid hyperemesis syndrome with exacerbations complicated by DEREK, chronic gastritis admitted on 8/24/2021 with 2 days of n/v without diarrhea or abdominal pain with ER evaluation notable for DEREK.  This is her 4th admission here since 5/24 for same.      She states she had abrupt onset of n/v and has not been able to keep down any food/fluid for 2 days.  No f/c/s, no cp or sob, no diarrhea.  No etiology for trigger.  Reports diabetes is under good control with diet.      ER notable for significant tachycardia in the 140's, BPs ok, Labs with DEREK with bun/creat67/4.85, lactic acid 3.3 with a bicarb of 24 and AG of 15.  .  Ketones negative.  CBC with leukocytosis of 15 with a neutrophilia.\" per LETICIA Murcia MD.    1. Acute kidney injury in the setting of CKD stage III -- 1b. SIRS   1c. Abnormal UA -- asymptomatic bacturia vs evolving UTI. Urine culture pending.    She is followed by Nephrology as outpatient but concern about adherence to follow up.  Does not have significant urge to urinate. Last normal creatine was in " September 2020 at which time was 0.9.  Since that time new baseline appears to be in the 1.5-1.9 range. This admission presents with DEREK and a BUN/Creatine of 67/4.85 consistent with prerenal state and probable component of ATN.  In July, 2021 she was seen by nephrology and is currently being evaluated for an autoimmune process.  Work up to date reveals an elevated ROBIN at 1:160 triggering additional testing porphyrin and extractable nuclear antigent testing which came back WNL.  Her DS DNA/free light chains, C4/C3, neutrophilic cytoplasmic ab all negative or WNL.  She was treated with IV NS with appropriate response in labs.  Her discharge serum creatine was 1.98.  UA was notable for protein, moderate LE, and RBC/WBC.  UC pending.  However she is a poor historian and concern about follow up with treat empirically as possible UTI (as SIRS criteria met -- tachycardia, leukocytosis) with PO Bactrim. Normal lactate.   On the day of discharge her labs improved -- WBC peaked at 15.2 and at discharge was 11.9 K.  Her CMP improved as well.  She is to follow up with her PCP and nephrologist with repeat labs in the next couple of weeks.      2. Intractable nausea and vomiting   Cyclic in nature -- consistent with cannabinoid hyperemesis syndrome with possible gastroparesis/gastritis.  UDS negative for THC or other illicit drugs.   Nausea and vomiting could be related to underlying UTI.  Day of discharge her nausea and vomiting was much improved.   She was provided with a PRN prescription for Compazine.  Electrolytes were stable at discharge.  She tolerated an advanced diet.     3. T2DM Hgb A1C 6.4.    Glucose Values Latest Ref Rng & Units 8/23/2021 8/24/2021 8/25/2021   Bedside Glucose (mg/dl )  - -- -- --   GLUCOSE 70 - 99 mg/dL 190(H) 179(H) 138(H)   Some recent data might be hidden    She notes an allergy to Metformin (diarrhea).  Not on any medications. She was treated with correctional scale coverage per protocol  when criteria met.  Follow up as OP.      4. Tachycardia // HTN: Hemodynamically stable.  Tachycardiac but denies shortness of breath or chest pain.  She is noted to be quite anxious and easily agitated at times. She has been pacing about the unit and endorses uncontrolled anxiety. She requested to resume her PTA Amlodipine.  Initially she states that her Bps are only elevated when she is hospitalized.      5. Glaucoma: Continue PTA Xalatan.    6. Adjustment disorder/non adherence to medical plan of care: She denies any safety concerns.  She does endorse increased anxiety while in the hospital which is to be expected.  She requires redirection and boundary setting.  There is a concern about adherence to medical plan.  She requested  assistance with arranging PCP and nephrology follow up appointments -- stating she is easily overwhelmed.  She does have access to schedule appointments and was encouraged to take ownership.  She provides multiple reasons -- primarily placing blame on the medical system (cancelled appointments, unable to secure a timely appointment, medication management etc) as challenges to being successful.  No SI or HI.  Does not pose imminent danger to self or others. Urine drug screen negative.  Previously on Venlafaxine  mg daily but has not taken in several months.  PTA medication of Nortriptyline 30 mg also noted.  Will resume Venlafxine XR 37.5 mg daily and anticipate dose escalation when seen by PCP.  Will continue PTA Nortriptyline and add PRN Vistaril to help with anxiety.  She was provided with referral for counseling.  Of note, during the course of hospitalization she threaten to leave AMA.  She agreed to stay the night prior to discharge.  On day of discharge, she left prior to receiving discharge instructions or paperwork/prescriptions.          Consultations This Hospital Stay   CARE MANAGEMENT / SOCIAL WORK IP CONSULT    Code Status   Full Code    Time Spent on this Encounter    I, JOSE Correa CNP, personally saw the patient today and spent greater than 30 minutes discharging this patient.       JOSE Correa CNP  [facility] OBSERVATION DEPT  [address] [place] [address]  Phone: [phone]  ______________________________________________________________________    Physical Exam   Vital Signs: Temp: 98.5  F (36.9  C) Temp src: Oral BP: 125/61 Pulse: (!) 144   Resp: 18 SpO2: 99 % O2 Device: None (Room air)    Weight: 161 lbs 1.6 oz  General Appearance: Pleasant yet anxious 32 yo AA female pacing in her room.  HEENT: NCAT.  PERRLA EOMI. Op clear. MMM.  Respiratory: BBS. Lungs CTA.    Cardiovascular: RRR. Tachycardic but normal. S1S2.  Tele; ST  GI: BS x 4 quads.  No significant CVA tenderness.  Skin: WDI.  No concerning lesions.  Neuro: AxOx3.  FC.  No focal deficits.  Clear speech.  Other: Psych: Anxious.  Pacing.  No SI or HI.  Minimizes condition.          Primary Care Physician   [facility]    Discharge Orders      Reason for your hospital stay    DEREK in the setting of CKD  Intractable nausea and vomiting  Anxiety/depression     Follow-up and recommended labs and tests     Follow up with your PCP or one of their associates (MD/, NP, PA) in 1 week -- post hospital visit.  Your Effexor was resumed after being off for several months and will need to be adjusted accordingly.  Do not go back on your prior dose until seen by your provider.  Recommend you have a repeat BMP drawn at that time to evaluate kidney and electrolyte status.     Follow up with your nephrologist (kidney doctor) in the next 2-4 weeks.     Recommend you see a therapist to help with your anxiety and depression.     Activity    Your activity upon discharge: activity as tolerated     When to contact your care team    Worsening abdominal pain, uncontrolled nausea/vomiting, fever > 101.5, chest pain, shortness of breath, or any other concerns.      Discharge Instructions    Avoid alcohol and drugs.   Seek help with anxiety and depression.  -- https://www.samhsa.gov or 1.800.662-HELP (3866).  We have resumed your Effexor but will need to adjust slowly. You need to follow up with your PCP in 1-2 weeks.  Okay to see an associate in the practice.  Follow up with nephrologist (kidney doctor) within the next 3-4 weeks.   Encourage hydration.     Diet    Follow this diet upon discharge: Orders Placed This Encounter      Regular Diet Adult       Significant Results and Procedures   Most Recent 3 CBC's:  Recent Labs   Lab Test 08/25/21  0904 08/23/21  2331 07/16/21  0546   WBC 11.9* 15.2* 8.1   HGB 11.5* 14.1 11.1*   MCV 84 81 85    492* 371     Most Recent 3 BMP's:  Recent Labs   Lab Test 08/25/21  0904 08/25/21  0811 08/25/21  0135 08/24/21  0453 08/23/21  2331     --   --  132* 131*   POTASSIUM 3.7  --   --  3.6 3.7   CHLORIDE 105  --   --  95 92*   CO2 22  --   --  24 24   BUN 38*  --   --  70* 67*   CR 1.98*  --   --  4.09* 4.85*   ANIONGAP 9  --   --  13 15*   DIMITRI 8.6  --   --  8.5 9.2   * 136* 151* 179* 190*     Most Recent 2 LFT's:  Recent Labs   Lab Test 08/25/21  0904 08/23/21  2331   AST 29 25   ALT 24 24   ALKPHOS 98 128   BILITOTAL 0.6 0.4     Most Recent Hemoglobin A1c:  Recent Labs   Lab Test 08/24/21  0453   A1C 6.4*     Most Recent 6 glucoses:  Recent Labs   Lab Test 08/25/21  0904 08/25/21  0811 08/25/21  0135 08/24/21  2213 08/24/21  1338 08/24/21  0737   * 136* 151* 162* 146* 183*   ,   Results for orders placed or performed during the hospital encounter of 05/13/21   CT Abdomen Pelvis w/o Contrast    Narrative    EXAM: CT ABDOMEN PELVIS W/O CONTRAST  LOCATION: [facility]  DATE/TIME: 5/13/2021 7:37 PM    INDICATION: Abdominal pain, acute, nonlocalized  COMPARISON: 9/11/2018  TECHNIQUE: CT scan of the abdomen and pelvis was performed without IV contrast. Multiplanar reformats were obtained. Dose reduction  techniques were used.  CONTRAST: None.    FINDINGS:   LOWER CHEST: Normal.    HEPATOBILIARY: Normal.    PANCREAS: Normal.    SPLEEN: Normal.    ADRENAL GLANDS: Normal.    KIDNEYS/BLADDER: Normal.    BOWEL: No obstruction or inflammatory change. Appendix normal.    LYMPH NODES: Normal.    VASCULATURE: Unremarkable.    PELVIC ORGANS: Vaginal collection device, surrounds cervix. No adnexal lesions. Small calcified uterine fibroids.    MUSCULOSKELETAL: No suspicious bony lesions. Significant interval weight loss.      Impression    IMPRESSION:   1.  No etiology for symptoms evident. Nothing obstructive or inflammatory.           Discharge Medications   Current Discharge Medication List      START taking these medications    Details   hydrOXYzine (ATARAX) 25 MG tablet Take 1 tablet (25 mg) by mouth every 6 hours as needed for anxiety  Qty: 60 tablet, Refills: 1    Associated Diagnoses: Adjustment disorder with mixed disturbance of emotions and conduct      prochlorperazine (COMPAZINE) 10 MG tablet Take 1 tablet (10 mg) by mouth every 6 hours as needed for nausea or vomiting  Qty: 10 tablet, Refills: 0    Associated Diagnoses: Vomiting, intractability of vomiting not specified, presence of nausea not specified, unspecified vomiting type      sulfamethoxazole-trimethoprim (BACTRIM DS) 800-160 MG tablet Take 1 tablet by mouth 2 times daily  Qty: 10 tablet, Refills: 0    Associated Diagnoses: Urinary tract infection with hematuria, site unspecified      venlafaxine (EFFEXOR-ER) 37.5 MG 24 hr tablet Take 1 tablet (37.5 mg) by mouth daily (with breakfast)  Qty: 30 tablet, Refills: 0    Associated Diagnoses: Adjustment disorder with mixed disturbance of emotions and conduct         CONTINUE these medications which have NOT CHANGED    Details   amLODIPine (NORVASC) 10 MG tablet Take 1 tablet (10 mg) by mouth daily  Qty: 30 tablet, Refills: 0    Associated Diagnoses: Essential hypertension      latanoprost (XALATAN) 0.005 %  ophthalmic solution Place 1 drop into both eyes At Bedtime      nortriptyline (PAMELOR) 10 MG capsule Take 30 mg by mouth At Bedtime          STOP taking these medications       OLANZapine (ZYPREXA) 5 MG tablet Comments:   Reason for Stopping:             Allergies   Allergies   Allergen Reactions     Metformin Diarrhea

## 2021-08-25 NOTE — PLAN OF CARE
Pt came back to hospital after discharging with meds and paperwork.    Patient's After Visit Summary was reviewed with patient.  Patient verbalized understanding of After Visit Summary, recommended follow up and was given an opportunity to ask questions.   Discharge medications sent home with patient/family: bactrim  Discharged with: friend    OBSERVATION patient END time: [id number]

## 2021-08-25 NOTE — PROVIDER NOTIFICATION
1945 Pt's bp: 184/167, 's. Pt appears anxious stating she is scared. Pt is observed standing at bedside and rubbing abdomen. Pt reported 7/10 abdominal and flank pain. Hydralazine, tylenol given. Received orders to give  zyprexa for anxiety from KRISTEN Espinoza. Assisted with deep breathing and assisted back to bed. Call light within reach. Writer offered lavender patches, pt declined. Will continue to monitor and reassess.     2035 BP recheck 137/80. Anxiety improved. Restless at times. HR remains in 140's. KRISTEN Espinoza aware. Tele ordered and urine tox screen ordered. Running ST with -120 on tele. Will continue to monitor.

## 2021-08-25 NOTE — PROGRESS NOTES
Care Management Follow Up    Length of Stay (days): 1    Expected Discharge Date: 08/25/2021     Concerns to be Addressed: discharge planning     Patient plan of care discussed at interdisciplinary rounds: Yes    Anticipated Discharge Disposition: Home     Anticipated Discharge Services:  home  Anticipated Discharge DME:  none  Referrals Placed by CM/SW: Specialty Providers, Scheduled Follow-up appointments  Private pay costs discussed: Not applicable    Additional Information:  Met with pt at bedside to discuss discharge needs.  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  She said she wanted assistance making apt because the earliest she could see her PCP was November.  Please see SW note, PCP apt scheduled.  CM called pt Nephrologist to assist with appointment support.  Unfortunately, the clinic got me to  for future appointment.  I left message Nephrology clinic to call and schedule an appointment.  I also place nephrology number on pt discharge instructions. Explained above information to pt.    No Briggs RN, BSN, PHN, CTS  Care Coordinator  [facility]  [phone]

## 2021-08-25 NOTE — PROVIDER NOTIFICATION
10:20 PM  Provider notified: X cover  Reason: Pt rating 5/10 abdominal pain not relieved with Tylenol. Pt requesting for prn. ST with hr 160's on tele.  Order: Atarax and 500ml bolus ordered by Dr. Cuevas.

## 2021-08-25 NOTE — DISCHARGE INSTRUCTIONS
Your care coordinator has scheduled you a Primary Care Hospitalization follow up appointment for Next Wednesday, 9/1 at 11:20am with Dr. Benson. Your regular PCP was not available until the week of 9/7. If you need to reschedule this appointment, please contact [facility] at [phone].     Dr. Gonzales- Nephrology [phone]

## 2021-08-25 NOTE — PROGRESS NOTES
Writer went to pt's room to go over with discharge paper and to hand antibiotic, but pt wasn't in room. Pt didn't inform staff when leaving the floor. PIV and tele removed prior to pt left. Called and left Voicemail for pt to call back to the unit.

## 2021-08-25 NOTE — PROGRESS NOTES
"Vitals: /61 (BP Location: Right arm)   Pulse (!) 144   Temp 98.5  F (36.9  C) (Oral)   Resp 18   Ht 1.651 m (5' 5\")   Wt 73.1 kg (161 lb 1.6 oz)   LMP 08/17/2021 (Approximate)   SpO2 99%   Breastfeeding No   BMI 26.81 kg/m       Neuro: alert and oriented x4. Able to make needs known.  Cardiac: tachycardic between 140s-150s  Lungs: wdl  GI: denies nausea/ vomiting/ diarrhea.   : voids with no problem  Pain: denies  IV: NS @150mL/hr  Labs/Imaging: Cr. 1.98. GFR 38. WBC 11.9.  Diet: regular.   Activity: up independently   Plan: Labs improved today. Plan for discharge home with out pt follow up with PCP and Nephrology. Pt to pick discharge meds from own pharmacy. Waiting for SW/CC meet with pt.    Will continue to monitor and provide supportive care.     "

## 2021-08-25 NOTE — PLAN OF CARE
Pt discharged without signing paperwork and picking medication. Bedside RN called and left message for pt. Pt called back and was told to come back to the hospital so RN could go over instructions and pt could sign/ antibiotics. Pt verbalized understanding and stated she will try to find a ride.

## 2021-08-25 NOTE — PLAN OF CARE
Pt is AxOx4 and able to make needs known. Ambulating independently due to Anxiety AEB Frequent Pacing. Pt experienced moderate flank pain and anxiety accompanied by hypertension and a HR in the 130s at 1900. PRN Hydralazine, and Tylenol administered with minimal resolve. Provider notified and 500mL bolus and PRN Xypreza ordered and administered with moderate improvement in Pt condition. Pt currently resting peacefully. Tele: ST 110s. Signifigant other updated about condition and POC. Pt and SO agreeable to POC, will continue to provide supportive cares.

## 2021-08-26 ENCOUNTER — PATIENT OUTREACH (OUTPATIENT)
Dept: CARE COORDINATION | Facility: CLINIC | Age: 31
End: 2021-08-26

## 2021-08-26 DIAGNOSIS — Z71.89 OTHER SPECIFIED COUNSELING: ICD-10-CM

## 2021-08-26 NOTE — DISCHARGE SUMMARY
"[facility]  Hospitalist Discharge Summary      Date of Admission:  8/24/2021  Date of Discharge:  8/25/2021  Discharging Provider: JOSE Correa CNP      Discharge Diagnoses   Acute kidney injury in the setting of CKD stage III  SIRS/lactic acidosis  Intractable nausea and vomiting   T2DM   HTN  Glaucoma  Adjustment disorder  Abnormal UA -- asymptomatic bacturia vs evolving UTI. Urine culture pending.      Follow-ups Needed After Discharge       Unresulted Labs Ordered in the Past 30 Days of this Admission     Date and Time Order Name Status Description    8/24/2021  5:31 AM Urine Culture In process       These results will be followed up by PCP    Discharge Disposition   Discharged to home  Condition at discharge: Stable      Hospital Course   \"Kym Lemus is a 31 year old female with a history of T2dm not currently on medications complicated by gastroparesis, as well as cannabinoid hyperemesis syndrome with exacerbations complicated by DEREK, chronic gastritis admitted on 8/24/2021 with 2 days of n/v without diarrhea or abdominal pain with ER evaluation notable for DEREK.  This is her 4th admission here since 5/24 for same.      She states she had abrupt onset of n/v and has not been able to keep down any food/fluid for 2 days.  No f/c/s, no cp or sob, no diarrhea.  No etiology for trigger.  Reports diabetes is under good control with diet.      ER notable for significant tachycardia in the 140's, BPs ok, Labs with DEREK with bun/creat67/4.85, lactic acid 3.3 with a bicarb of 24 and AG of 15.  .  Ketones negative.  CBC with leukocytosis of 15 with a neutrophilia.\" per LETICIA Murcia MD.    1. Acute kidney injury in the setting of CKD stage III   1b. SIRS/Lactic acidosis   1c. Abnormal UA -- asymptomatic bacturia vs evolving UTI. Urine culture pending.    She is followed by Nephrology as outpatient but concern about adherence to follow up.  Does not have significant urge to urinate. " Last normal creatine was in September 2020 at which time was 0.9.  Since that time new baseline appears to be in the 1.5-1.9 range. This admission presents with DEREK and a BUN/Creatine of 67/4.85 consistent with prerenal state and probable component of ATN.  In July, 2021 she was seen by nephrology and is currently being evaluated for an autoimmune process.  Work up to date reveals an elevated ROBIN at 1:160 triggering additional testing porphyrin and extractable nuclear antigent testing which came back WNL.  Her DS DNA/free light chains, C4/C3, neutrophilic cytoplasmic ab all negative or WNL.  She was treated with IV NS with appropriate response in labs.  Her discharge serum creatine was 1.98.  UA was notable for protein, moderate LE, and RBC/WBC.  UC pending.  However she is a poor historian and concern about follow up with treat empirically as possible UTI (as SIRS criteria met -- tachycardia, leukocytosis) with PO Bactrim. On the day of discharge her labs improved -- WBC peaked at 15.2 and at discharge was 11.9 K. Her initial lactic acid was 3.3 and normalized after IVF to 1.7.  Elevated lactic acid is most likely secondary to dehydration/nausea/vomiting.   Her CMP improved as well with improvement in her serum creatine.  She is to follow up with her PCP and nephrologist with repeat labs in the next couple of weeks.      2. Intractable nausea and vomiting   Cyclic in nature -- consistent with cannabinoid hyperemesis syndrome with possible gastroparesis/gastritis.  UDS negative for THC or other illicit drugs.   Nausea and vomiting could be related to underlying UTI.  Day of discharge her nausea and vomiting was much improved.   She was provided with a PRN prescription for Compazine.  Electrolytes were stable at discharge.  She tolerated an advanced diet.     3. T2DM Hgb A1C 6.4.    Glucose Values Latest Ref Rng & Units 8/23/2021 8/24/2021 8/25/2021   Bedside Glucose (mg/dl )  - -- -- --   GLUCOSE 70 - 99 mg/dL  190(H) 179(H) 138(H)   Some recent data might be hidden    She notes an allergy to Metformin (diarrhea).  Not on any medications. She was treated with correctional scale coverage per protocol when criteria met.  Follow up as OP.      4. Tachycardia // HTN: Hemodynamically stable.  Tachycardiac but denies shortness of breath or chest pain.  She is noted to be quite anxious and easily agitated at times. She has been pacing about the unit and endorses uncontrolled anxiety. She requested to resume her PTA Amlodipine.  Initially she states that her Bps are only elevated when she is hospitalized.      5. Glaucoma: Continue PTA Xalatan.    6. Adjustment disorder/non adherence to medical plan of care: She denies any safety concerns.  She does endorse increased anxiety while in the hospital which is to be expected.  She requires redirection and boundary setting.  There is a concern about adherence to medical plan.  She requested  assistance with arranging PCP and nephrology follow up appointments -- stating she is easily overwhelmed.  She does have access to schedule appointments and was encouraged to take ownership.  She provides multiple reasons -- primarily placing blame on the medical system (cancelled appointments, unable to secure a timely appointment, medication management etc) as challenges to being successful.  No SI or HI.  Does not pose imminent danger to self or others. Urine drug screen negative.  Previously on Venlafaxine  mg daily but has not taken in several months.  PTA medication of Nortriptyline 30 mg also noted.  Will resume Venlafxine XR 37.5 mg daily and anticipate dose escalation when seen by PCP.  Will continue PTA Nortriptyline and add PRN Vistaril to help with anxiety.  She was provided with referral for counseling.  Of note, during the course of hospitalization she threaten to leave AMA.  She agreed to stay the night prior to discharge.  On day of discharge, she left prior to receiving  discharge instructions or paperwork/prescriptions.          Consultations This Hospital Stay   CARE MANAGEMENT / SOCIAL WORK IP CONSULT    Code Status   Full Code    Time Spent on this Encounter   I, JOSE Correa CNP, personally saw the patient today and spent greater than 30 minutes discharging this patient.       JOSE Correa CNP  [facility] OBSERVATION DEPT  [address] [place] [address]  Phone: [phone]  ______________________________________________________________________    Physical Exam   Vital Signs: Temp: 98.5  F (36.9  C) Temp src: Oral BP: 125/61 Pulse: (!) 144   Resp: 18 SpO2: 99 % O2 Device: None (Room air)    Weight: 161 lbs 1.6 oz  General Appearance: Pleasant yet anxious 32 yo AA female pacing in her room.  HEENT: NCAT.  PERRLA EOMI. Op clear. MMM.  Respiratory: BBS. Lungs CTA.    Cardiovascular: RRR. Tachycardic but normal. S1S2.  Tele; ST  GI: BS x 4 quads.  No significant CVA tenderness.  Skin: WDI.  No concerning lesions.  Neuro: AxOx3.  FC.  No focal deficits.  Clear speech.  Other: Psych: Anxious.  Pacing.  No SI or HI.  Minimizes condition.          Primary Care Physician   [facility]    Discharge Orders      Reason for your hospital stay    DEREK in the setting of CKD  Intractable nausea and vomiting  Anxiety/depression     Follow-up and recommended labs and tests     Follow up with your PCP or one of their associates (MD/, NP, PA) in 1 week -- post hospital visit.  Your Effexor was resumed after being off for several months and will need to be adjusted accordingly.  Do not go back on your prior dose until seen by your provider.  Recommend you have a repeat BMP drawn at that time to evaluate kidney and electrolyte status.     Follow up with your nephrologist (kidney doctor) in the next 2-4 weeks.     Recommend you see a therapist to help with your anxiety and depression.     Activity    Your activity upon  discharge: activity as tolerated     When to contact your care team    Worsening abdominal pain, uncontrolled nausea/vomiting, fever > 101.5, chest pain, shortness of breath, or any other concerns.     Discharge Instructions    Avoid alcohol and drugs.   Seek help with anxiety and depression.  -- https://www.samhsa.gov or 1.126.667-HELP (5473).  We have resumed your Effexor but will need to adjust slowly. You need to follow up with your PCP in 1-2 weeks.  Okay to see an associate in the practice.  Follow up with nephrologist (kidney doctor) within the next 3-4 weeks.   Encourage hydration.     Diet    Follow this diet upon discharge: Orders Placed This Encounter      Regular Diet Adult       Significant Results and Procedures   Most Recent 3 CBC's:  Recent Labs   Lab Test 08/25/21  0904 08/23/21  2331 07/16/21  0546   WBC 11.9* 15.2* 8.1   HGB 11.5* 14.1 11.1*   MCV 84 81 85    492* 371     Most Recent 3 BMP's:  Recent Labs   Lab Test 08/25/21  0904 08/25/21  0811 08/25/21  0135 08/24/21  0453 08/23/21  2331     --   --  132* 131*   POTASSIUM 3.7  --   --  3.6 3.7   CHLORIDE 105  --   --  95 92*   CO2 22  --   --  24 24   BUN 38*  --   --  70* 67*   CR 1.98*  --   --  4.09* 4.85*   ANIONGAP 9  --   --  13 15*   DIMITRI 8.6  --   --  8.5 9.2   * 136* 151* 179* 190*     Most Recent 2 LFT's:  Recent Labs   Lab Test 08/25/21  0904 08/23/21  2331   AST 29 25   ALT 24 24   ALKPHOS 98 128   BILITOTAL 0.6 0.4     Most Recent Hemoglobin A1c:  Recent Labs   Lab Test 08/24/21  0453   A1C 6.4*     Most Recent 6 glucoses:  Recent Labs   Lab Test 08/25/21  0904 08/25/21  0811 08/25/21  0135 08/24/21  2213 08/24/21  1338 08/24/21  0737   * 136* 151* 162* 146* 183*   ,   Results for orders placed or performed during the hospital encounter of 05/13/21   CT Abdomen Pelvis w/o Contrast    Narrative    EXAM: CT ABDOMEN PELVIS W/O CONTRAST  LOCATION: [facility]  DATE/TIME: 5/13/2021 7:37  PM    INDICATION: Abdominal pain, acute, nonlocalized  COMPARISON: 9/11/2018  TECHNIQUE: CT scan of the abdomen and pelvis was performed without IV contrast. Multiplanar reformats were obtained. Dose reduction techniques were used.  CONTRAST: None.    FINDINGS:   LOWER CHEST: Normal.    HEPATOBILIARY: Normal.    PANCREAS: Normal.    SPLEEN: Normal.    ADRENAL GLANDS: Normal.    KIDNEYS/BLADDER: Normal.    BOWEL: No obstruction or inflammatory change. Appendix normal.    LYMPH NODES: Normal.    VASCULATURE: Unremarkable.    PELVIC ORGANS: Vaginal collection device, surrounds cervix. No adnexal lesions. Small calcified uterine fibroids.    MUSCULOSKELETAL: No suspicious bony lesions. Significant interval weight loss.      Impression    IMPRESSION:   1.  No etiology for symptoms evident. Nothing obstructive or inflammatory.           Discharge Medications   Current Discharge Medication List      START taking these medications    Details   hydrOXYzine (ATARAX) 25 MG tablet Take 1 tablet (25 mg) by mouth every 6 hours as needed for anxiety  Qty: 60 tablet, Refills: 1    Associated Diagnoses: Adjustment disorder with mixed disturbance of emotions and conduct      prochlorperazine (COMPAZINE) 10 MG tablet Take 1 tablet (10 mg) by mouth every 6 hours as needed for nausea or vomiting  Qty: 10 tablet, Refills: 0    Associated Diagnoses: Vomiting, intractability of vomiting not specified, presence of nausea not specified, unspecified vomiting type      sulfamethoxazole-trimethoprim (BACTRIM DS) 800-160 MG tablet Take 1 tablet by mouth 2 times daily  Qty: 10 tablet, Refills: 0    Associated Diagnoses: Urinary tract infection with hematuria, site unspecified      venlafaxine (EFFEXOR-ER) 37.5 MG 24 hr tablet Take 1 tablet (37.5 mg) by mouth daily (with breakfast)  Qty: 30 tablet, Refills: 0    Associated Diagnoses: Adjustment disorder with mixed disturbance of emotions and conduct         CONTINUE these medications which have  NOT CHANGED    Details   amLODIPine (NORVASC) 10 MG tablet Take 1 tablet (10 mg) by mouth daily  Qty: 30 tablet, Refills: 0    Associated Diagnoses: Essential hypertension      latanoprost (XALATAN) 0.005 % ophthalmic solution Place 1 drop into both eyes At Bedtime      nortriptyline (PAMELOR) 10 MG capsule Take 30 mg by mouth At Bedtime          STOP taking these medications       OLANZapine (ZYPREXA) 5 MG tablet Comments:   Reason for Stopping:             Allergies   Allergies   Allergen Reactions     Metformin Diarrhea

## 2021-08-26 NOTE — PROGRESS NOTES
Clinic Care Coordination Contact  [facility] [[email or web address]]/Voicemail       Clinical Data: Care Coordinator Outreach  Outreach attempted x 1.  Left message on patient's voicemail with call back information and requested return call.  Plan: Care Coordinator will try to reach patient again in 1-2 business days.    Sumit Mcfarlane  Community Health Worker  [facility]  Ph:[phone]

## 2021-08-27 NOTE — PROGRESS NOTES
Clinic Care Coordination Contact  [facility]/Voicemail       Clinical Data: Care Coordinator Outreach  Outreach attempted x 2.  Left message on patient's voicemail with call back information and requested return call.  Plan:  Care Coordinator will try to reach patient again in 1-2 business days.    Sumit Mcfarlane  Community Health Worker  [facility]  Ph:[phone]

## 2021-08-28 ENCOUNTER — HOSPITAL ENCOUNTER (OUTPATIENT)
Facility: CLINIC | Age: 31
Setting detail: OBSERVATION
Discharge: HOME | End: 2021-08-29
Attending: EMERGENCY MEDICINE | Admitting: INTERNAL MEDICINE
Payer: COMMERCIAL

## 2021-08-28 DIAGNOSIS — R11.2 INTRACTABLE NAUSEA AND VOMITING: ICD-10-CM

## 2021-08-28 DIAGNOSIS — N17.9 ACUTE KIDNEY INJURY (H): ICD-10-CM

## 2021-08-28 LAB
ALBUMIN SERPL-MCNC: 4.8 G/DL (ref 3.4–5)
ALP SERPL-CCNC: 92 U/L (ref 40–150)
ALT SERPL W P-5'-P-CCNC: 27 U/L (ref 0–50)
ANION GAP SERPL CALCULATED.3IONS-SCNC: 12 MMOL/L (ref 3–14)
AST SERPL W P-5'-P-CCNC: 27 U/L (ref 0–45)
BASOPHILS # BLD AUTO: 0 10E3/UL (ref 0–0.2)
BASOPHILS NFR BLD AUTO: 0 %
BILIRUB DIRECT SERPL-MCNC: 0.1 MG/DL (ref 0–0.2)
BILIRUB SERPL-MCNC: 0.4 MG/DL (ref 0.2–1.3)
BUN SERPL-MCNC: 43 MG/DL (ref 7–30)
CALCIUM SERPL-MCNC: 9.5 MG/DL (ref 8.5–10.1)
CHLORIDE BLD-SCNC: 93 MMOL/L (ref 94–109)
CO2 SERPL-SCNC: 23 MMOL/L (ref 20–32)
CREAT SERPL-MCNC: 2.53 MG/DL (ref 0.52–1.04)
EOSINOPHIL # BLD AUTO: 0 10E3/UL (ref 0–0.7)
EOSINOPHIL NFR BLD AUTO: 0 %
ERYTHROCYTE [DISTWIDTH] IN BLOOD BY AUTOMATED COUNT: 11.7 % (ref 10–15)
GFR SERPL CREATININE-BSD FRML MDRD: 24 ML/MIN/1.73M2
GLUCOSE BLD-MCNC: 125 MG/DL (ref 70–99)
HCG SERPL QL: NEGATIVE
HCT VFR BLD AUTO: 33.3 % (ref 35–47)
HGB BLD-MCNC: 11.8 G/DL (ref 11.7–15.7)
HOLD SPECIMEN: NORMAL
IMM GRANULOCYTES # BLD: 0.1 10E3/UL
IMM GRANULOCYTES NFR BLD: 1 %
LIPASE SERPL-CCNC: 106 U/L (ref 73–393)
LYMPHOCYTES # BLD AUTO: 2.5 10E3/UL (ref 0.8–5.3)
LYMPHOCYTES NFR BLD AUTO: 24 %
MCH RBC QN AUTO: 27.9 PG (ref 26.5–33)
MCHC RBC AUTO-ENTMCNC: 35.4 G/DL (ref 31.5–36.5)
MCV RBC AUTO: 79 FL (ref 78–100)
MONOCYTES # BLD AUTO: 1.2 10E3/UL (ref 0–1.3)
MONOCYTES NFR BLD AUTO: 12 %
NEUTROPHILS # BLD AUTO: 6.6 10E3/UL (ref 1.6–8.3)
NEUTROPHILS NFR BLD AUTO: 63 %
NRBC # BLD AUTO: 0 10E3/UL
NRBC BLD AUTO-RTO: 0 /100
PLATELET # BLD AUTO: 458 10E3/UL (ref 150–450)
POTASSIUM BLD-SCNC: 3.3 MMOL/L (ref 3.4–5.3)
PROT SERPL-MCNC: 9.1 G/DL (ref 6.8–8.8)
RBC # BLD AUTO: 4.23 10E6/UL (ref 3.8–5.2)
SARS-COV-2 RNA RESP QL NAA+PROBE: NEGATIVE
SODIUM SERPL-SCNC: 128 MMOL/L (ref 133–144)
WBC # BLD AUTO: 10.4 10E3/UL (ref 4–11)

## 2021-08-28 PROCEDURE — G0378 HOSPITAL OBSERVATION PER HR: HCPCS

## 2021-08-28 PROCEDURE — 250N000009 HC RX 250: Performed by: EMERGENCY MEDICINE

## 2021-08-28 PROCEDURE — 82248 BILIRUBIN DIRECT: CPT | Performed by: EMERGENCY MEDICINE

## 2021-08-28 PROCEDURE — 85025 COMPLETE CBC W/AUTO DIFF WBC: CPT | Performed by: EMERGENCY MEDICINE

## 2021-08-28 PROCEDURE — 258N000003 HC RX IP 258 OP 636: Performed by: EMERGENCY MEDICINE

## 2021-08-28 PROCEDURE — 80048 BASIC METABOLIC PNL TOTAL CA: CPT | Performed by: EMERGENCY MEDICINE

## 2021-08-28 PROCEDURE — 96376 TX/PRO/DX INJ SAME DRUG ADON: CPT

## 2021-08-28 PROCEDURE — 96374 THER/PROPH/DIAG INJ IV PUSH: CPT

## 2021-08-28 PROCEDURE — 87635 SARS-COV-2 COVID-19 AMP PRB: CPT | Performed by: EMERGENCY MEDICINE

## 2021-08-28 PROCEDURE — 250N000011 HC RX IP 250 OP 636: Performed by: EMERGENCY MEDICINE

## 2021-08-28 PROCEDURE — C9803 HOPD COVID-19 SPEC COLLECT: HCPCS

## 2021-08-28 PROCEDURE — 83690 ASSAY OF LIPASE: CPT | Performed by: EMERGENCY MEDICINE

## 2021-08-28 PROCEDURE — 84703 CHORIONIC GONADOTROPIN ASSAY: CPT | Performed by: EMERGENCY MEDICINE

## 2021-08-28 PROCEDURE — 258N000003 HC RX IP 258 OP 636: Performed by: PHYSICIAN ASSISTANT

## 2021-08-28 PROCEDURE — 36415 COLL VENOUS BLD VENIPUNCTURE: CPT | Performed by: EMERGENCY MEDICINE

## 2021-08-28 PROCEDURE — 250N000013 HC RX MED GY IP 250 OP 250 PS 637: Performed by: PHYSICIAN ASSISTANT

## 2021-08-28 PROCEDURE — 99219 PR INITIAL OBSERVATION CARE,LEVEL II: CPT | Performed by: PHYSICIAN ASSISTANT

## 2021-08-28 PROCEDURE — 96375 TX/PRO/DX INJ NEW DRUG ADDON: CPT

## 2021-08-28 PROCEDURE — 99285 EMERGENCY DEPT VISIT HI MDM: CPT | Mod: 25

## 2021-08-28 RX ORDER — POLYETHYLENE GLYCOL 3350 17 G/17G
17 POWDER, FOR SOLUTION ORAL DAILY PRN
Status: DISCONTINUED | OUTPATIENT
Start: 2021-08-28 | End: 2021-08-29 | Stop reason: HOSPADM

## 2021-08-28 RX ORDER — AMOXICILLIN 250 MG
2 CAPSULE ORAL 2 TIMES DAILY PRN
Status: DISCONTINUED | OUTPATIENT
Start: 2021-08-28 | End: 2021-08-29 | Stop reason: HOSPADM

## 2021-08-28 RX ORDER — DIPHENHYDRAMINE HYDROCHLORIDE 50 MG/ML
25 INJECTION INTRAMUSCULAR; INTRAVENOUS ONCE
Status: COMPLETED | OUTPATIENT
Start: 2021-08-28 | End: 2021-08-28

## 2021-08-28 RX ORDER — NORTRIPTYLINE HCL 10 MG
30 CAPSULE ORAL AT BEDTIME
Status: DISCONTINUED | OUTPATIENT
Start: 2021-08-28 | End: 2021-08-29 | Stop reason: HOSPADM

## 2021-08-28 RX ORDER — PROCHLORPERAZINE MALEATE 5 MG
10 TABLET ORAL EVERY 6 HOURS PRN
Status: DISCONTINUED | OUTPATIENT
Start: 2021-08-28 | End: 2021-08-29 | Stop reason: HOSPADM

## 2021-08-28 RX ORDER — ONDANSETRON 4 MG/1
4 TABLET, ORALLY DISINTEGRATING ORAL EVERY 6 HOURS PRN
Status: DISCONTINUED | OUTPATIENT
Start: 2021-08-28 | End: 2021-08-29 | Stop reason: HOSPADM

## 2021-08-28 RX ORDER — PROCHLORPERAZINE 25 MG
25 SUPPOSITORY, RECTAL RECTAL EVERY 12 HOURS PRN
Status: DISCONTINUED | OUTPATIENT
Start: 2021-08-28 | End: 2021-08-29 | Stop reason: HOSPADM

## 2021-08-28 RX ORDER — HALOPERIDOL 5 MG/ML
2.5 INJECTION INTRAMUSCULAR ONCE
Status: COMPLETED | OUTPATIENT
Start: 2021-08-28 | End: 2021-08-28

## 2021-08-28 RX ORDER — ACETAMINOPHEN 650 MG/1
650 SUPPOSITORY RECTAL EVERY 6 HOURS PRN
Status: DISCONTINUED | OUTPATIENT
Start: 2021-08-28 | End: 2021-08-29 | Stop reason: HOSPADM

## 2021-08-28 RX ORDER — AMLODIPINE BESYLATE 10 MG/1
10 TABLET ORAL DAILY
Status: DISCONTINUED | OUTPATIENT
Start: 2021-08-29 | End: 2021-08-29 | Stop reason: HOSPADM

## 2021-08-28 RX ORDER — LIDOCAINE 40 MG/G
CREAM TOPICAL
Status: DISCONTINUED | OUTPATIENT
Start: 2021-08-28 | End: 2021-08-29 | Stop reason: HOSPADM

## 2021-08-28 RX ORDER — ONDANSETRON 2 MG/ML
4 INJECTION INTRAMUSCULAR; INTRAVENOUS ONCE
Status: COMPLETED | OUTPATIENT
Start: 2021-08-28 | End: 2021-08-28

## 2021-08-28 RX ORDER — HYDROXYZINE HYDROCHLORIDE 25 MG/1
25 TABLET, FILM COATED ORAL EVERY 6 HOURS PRN
Status: DISCONTINUED | OUTPATIENT
Start: 2021-08-28 | End: 2021-08-29 | Stop reason: HOSPADM

## 2021-08-28 RX ORDER — AMOXICILLIN 250 MG
1 CAPSULE ORAL 2 TIMES DAILY PRN
Status: DISCONTINUED | OUTPATIENT
Start: 2021-08-28 | End: 2021-08-29 | Stop reason: HOSPADM

## 2021-08-28 RX ORDER — ACETAMINOPHEN 325 MG/1
650 TABLET ORAL EVERY 6 HOURS PRN
Status: DISCONTINUED | OUTPATIENT
Start: 2021-08-28 | End: 2021-08-29 | Stop reason: HOSPADM

## 2021-08-28 RX ORDER — SODIUM CHLORIDE 9 MG/ML
INJECTION, SOLUTION INTRAVENOUS CONTINUOUS
Status: ACTIVE | OUTPATIENT
Start: 2021-08-28 | End: 2021-08-29

## 2021-08-28 RX ORDER — ONDANSETRON 2 MG/ML
4 INJECTION INTRAMUSCULAR; INTRAVENOUS EVERY 6 HOURS PRN
Status: DISCONTINUED | OUTPATIENT
Start: 2021-08-28 | End: 2021-08-29 | Stop reason: HOSPADM

## 2021-08-28 RX ORDER — VENLAFAXINE HYDROCHLORIDE 37.5 MG/1
37.5 CAPSULE, EXTENDED RELEASE ORAL
Status: DISCONTINUED | OUTPATIENT
Start: 2021-08-29 | End: 2021-08-29 | Stop reason: HOSPADM

## 2021-08-28 RX ADMIN — ONDANSETRON 4 MG: 2 INJECTION INTRAMUSCULAR; INTRAVENOUS at 12:13

## 2021-08-28 RX ADMIN — DIPHENHYDRAMINE HYDROCHLORIDE 25 MG: 50 INJECTION INTRAMUSCULAR; INTRAVENOUS at 15:42

## 2021-08-28 RX ADMIN — SODIUM CHLORIDE: 9 INJECTION, SOLUTION INTRAVENOUS at 18:41

## 2021-08-28 RX ADMIN — ONDANSETRON 4 MG: 2 INJECTION INTRAMUSCULAR; INTRAVENOUS at 15:44

## 2021-08-28 RX ADMIN — NORTRIPTYLINE HYDROCHLORIDE 30 MG: 10 CAPSULE ORAL at 21:46

## 2021-08-28 RX ADMIN — HALOPERIDOL LACTATE 2.5 MG: 5 INJECTION, SOLUTION INTRAMUSCULAR at 15:49

## 2021-08-28 RX ADMIN — FAMOTIDINE 20 MG: 10 INJECTION, SOLUTION INTRAVENOUS at 15:46

## 2021-08-28 RX ADMIN — SODIUM CHLORIDE, POTASSIUM CHLORIDE, SODIUM LACTATE AND CALCIUM CHLORIDE 1000 ML: 600; 310; 30; 20 INJECTION, SOLUTION INTRAVENOUS at 15:42

## 2021-08-28 ASSESSMENT — ENCOUNTER SYMPTOMS
FLANK PAIN: 1
ABDOMINAL PAIN: 1
VOMITING: 1
NAUSEA: 1

## 2021-08-28 NOTE — H&P
[facility]    History and Physical - Hospitalist Service       Date of Admission:  8/28/2021    Assessment & Plan      Kym Lemus is a 31 year old female with a PMHx significant for diet controlled DM, probable cyclic vomiting syndrome, hx of marijuana use raising concern for cannabinoid hyperemesis, chronic abdominal pain, CKD, and HTN, who was admitted on 8/28/2021 with intractable nausea and vomiting.   In the ED, HR is mildly elevated at 118, BP elevated at 185/115, VS otherwise stable. CMP reveals hyponatremia at 128, hypokalemia at 3.3, DEREK with Cr of 2.53 and BUN 43, likely prerenal on top of CKD, otherwise unremarkable. Lipase normal. CBC unremarkable. She was given 4 mg IV zofran x2, 25 mg IV benadryl, 20 mg IV famotidine, 2.5 mg IV haldol and 1L LR bolus.    1. Acute on chronic kidney disease - baseline Cr is likely around 1.5 but has been quite labile recently. Cr was 1.98 on previous discharge, now 2.53 with BUN elevated at 43, likely prerenal component. In 7/2021 she was seen by nephrology and looks to have been evaluated for autoimmune processes and only found to have elevated ROBIN 1:160 prompting porphyrin and extractable nuclear antigen testing both of which came back wnl. Otherwise her DS DNA/free light chains/C4/C3/neutrophil cytoplasmic ab all negative or wnl. Received 1L LR in ED  -IVFs,  mL/hr x 10 hrs  -I&Os  -recheck labs in AM    2. Intractable nausea / vomiting, hx of cyclic vomiting - pt denies any specific trigger. Denies marijuana use for past 8 months. Was seen by neurology and started on nortriptyline for chronic abd pain. States sx are not improved from previous hospitalization, was able to keep water down until today.  -continue supportive cares and antiemetics  - ADAT    3. Hyponatremia / hypokalemia - due to N/V and kidney disease. Will give IVFs. K is only minimally low, likely improve with improved oral intake. Recheck labs in AM  4. HTN -  managed with amlodipine, pt has reported in the past that she does not take this at home and her BP is only elevated during episodes of N/V, she endorses taking it currently. Will resume here  5. Diet controlled DM - glucose 125, HgbA1c 6.4 on 8/24/21  6. Chronic abd pain - stress has been a trigger in the past, fam hx of migraines. Was seen by neurology and started on nortriptyline with instructions to titrate, now on 30 mg.     Covid-19 negative       Diet:   advance diet as tolerated  DVT Prophylaxis: Low Risk/Ambulatory with no VTE prophylaxis indicated  Gallagher Catheter: Not present  Central Lines: None  Code Status:   full code    Clinically Significant Risk Factors Present on Admission         # Hyponatremia: Na = 128 mmol/L (Ref range: 133 - 144 mmol/L) on admission, will monitor as appropriate           Disposition Plan   Expected discharge: 1-2 days recommended to prior living arrangement once tolerating oral intake.     The patient's care was discussed with the Bedside Nurse, Patient and ED physician, Dr. Conner.    Keyana Jones PA-C  [facility]  Securely message with the Vocera Web Console (learn more here)  Text page via [employer] Paging/Directory      ______________________________________________________________________    Chief Complaint   Nausea / vomiting    History is obtained from the patient    History of Present Illness   Kym Lemus is a 31 year old female with a PMHx significant for diet controlled DM, probable cyclic vomiting syndrome, hx of marijuana use raising concern for cannabinoid hyperemesis, chronic abdominal pain, CKD, and HTN who presents to the ED with ongoing nausea and vomiting and is concerned about her kidney function. She was recently admitted to the hospital and discharged on 8/26. She states that she was still nauseous at that time but her kidney function was better so they let her go home. She states she was able to tolerate water until  today, otherwise has not been able to keep anything else down. She denies any specific trigger, she denies marijuana use for the past 8 months. She endorses taking her amlodipine regularly as well as her nortriptyline. She denies fever, chills, cough, chest pain, shortness of breath, or diarrhea. She does not endorse dysuria and was recently treated for a UTI. The urine culture was negative.    Review of Systems    The 10 point Review of Systems is negative other than noted in the HPI or here.     Past Medical History    I have reviewed this patient's medical history and updated it with pertinent information if needed.   Past Medical History:   Diagnosis Date     Benign essential hypertension      Cannabinoid hyperemesis syndrome      Cervical intraepithelial glandular neoplasia     s/p LEEP     Diabetes mellitus type 2, diet-controlled (H)      Gastritis      Gastroparesis     and canabinoid hyperemesis cb DEREK       Past Surgical History   I have reviewed this patient's surgical history and updated it with pertinent information if needed.  Past Surgical History:   Procedure Laterality Date     LEEP TX, CERVICAL      EYAL !!       Social History   I have reviewed this patient's social history and updated it with pertinent information if needed.  Social History     Tobacco Use     Smoking status: Never Smoker     Smokeless tobacco: Never Used   Substance Use Topics     Alcohol use: Not Currently     Drug use: Yes     Types: Marijuana       Family History   I have reviewed this patient's family history and updated it with pertinent information if needed.  Family History   Problem Relation Age of Onset     Hypertension Mother      Diabetes Type 2  Mother      Cataracts Mother        Prior to Admission Medications   Prior to Admission Medications   Prescriptions Last Dose Informant Patient Reported? Taking?   amLODIPine (NORVASC) 10 MG tablet   No No   Sig: Take 1 tablet (10 mg) by mouth daily   hydrOXYzine (ATARAX) 25  MG tablet   No No   Sig: Take 1 tablet (25 mg) by mouth every 6 hours as needed for anxiety   latanoprost (XALATAN) 0.005 % ophthalmic solution   Yes No   Sig: Place 1 drop into both eyes At Bedtime   nortriptyline (PAMELOR) 10 MG capsule  Self Yes No   Sig: Take 30 mg by mouth At Bedtime    prochlorperazine (COMPAZINE) 10 MG tablet   No No   Sig: Take 1 tablet (10 mg) by mouth every 6 hours as needed for nausea or vomiting   sulfamethoxazole-trimethoprim (BACTRIM DS) 800-160 MG tablet   No No   Sig: Take 1 tablet by mouth 2 times daily   venlafaxine (EFFEXOR-ER) 37.5 MG 24 hr tablet   No No   Sig: Take 1 tablet (37.5 mg) by mouth daily (with breakfast)      Facility-Administered Medications: None     Allergies   Allergies   Allergen Reactions     Metformin Diarrhea       Physical Exam   Vital Signs: Temp: 98.6  F (37  C) Temp src: Oral BP: (!) 185/115 Pulse: 118   Resp: 16 SpO2: 98 %      Weight: 0 lbs 0 oz    GENERAL:  Comfortable.  PSYCH: pleasant, oriented, No acute distress.  HEENT:  Atraumatic, normocephalic. Normal conjunctiva, normal hearing, and oropharynx is normal.  NECK:  Supple, no neck vein distention  HEART:  Normal S1, S2 with no murmur, no pericardial rub, gallops or S3 or S4.  LUNGS:  Clear to auscultation, normal Respiratory effort. No wheezing, rales or ronchi.  GI:  Soft, normal bowel sounds. Upper abd tenderness, non distended.   EXTREMITIES:  No pedal edema, +2 pulses bilateral and equal.  SKIN:  Dry to touch, No rash, wound or ulcerations.  NEUROLOGIC:  CN 2-12 intact, BL 5/5 symmetric upper and lower extremity strength, sensation is intact with no focal deficits.      Data   Data reviewed today: I reviewed all medications, new labs and imaging results over the last 24 hours. I personally reviewed no images or EKG's today.    Most Recent 3 CBC's:Recent Labs   Lab Test 08/28/21  1210 08/25/21  0904 08/23/21  2331   WBC 10.4 11.9* 15.2*   HGB 11.8 11.5* 14.1   MCV 79 84 81   * 414 492*      Most Recent 3 BMP's:Recent Labs   Lab Test 08/28/21  1210 08/25/21  0904 08/25/21  0811 08/24/21  0453   * 136  --  132*   POTASSIUM 3.3* 3.7  --  3.6   CHLORIDE 93* 105  --  95   CO2 23 22  --  24   BUN 43* 38*  --  70*   CR 2.53* 1.98*  --  4.09*   ANIONGAP 12 9  --  13   DIMITRI 9.5 8.6  --  8.5   * 138* 136* 179*     Most Recent 2 LFT's:Recent Labs   Lab Test 08/28/21  1210 08/25/21  0904   AST 27 29   ALT 27 24   ALKPHOS 92 98   BILITOTAL 0.4 0.6     No results found for this or any previous visit (from the past 24 hour(s)).

## 2021-08-28 NOTE — ED NOTES
[facility]  ED Nurse Handoff Report    Kym Lemus is a 31 year old female   ED Chief complaint: Abdominal Pain  . ED Diagnosis:   Final diagnoses:   Acute kidney injury (H)   Intractable nausea and vomiting     Allergies:   Allergies   Allergen Reactions     Metformin Diarrhea       Code Status: Full Code  Activity level - Baseline/Home:  Independent. Activity Level - Current:   Assist X 1. Lift room needed: No. Bariatric: No   Needed: No   Isolation: No. Infection: Not Applicable.     Vital Signs:   Vitals:    08/28/21 1600 08/28/21 1630 08/28/21 1700 08/28/21 1710   BP: (!) 176/113 (!) 163/111 (!) 129/91    Pulse: 109 111 110    Resp:       Temp:       TempSrc:       SpO2: 94% 99% 99% 100%       Cardiac Rhythm:  ,      Pain level:    Patient confused: No. Patient Falls Risk: Yes.   Elimination Status: Has voided   Patient Report - Initial Complaint: Pt recently admitted for intractable vomiting, discharged home.  N/v returned withab pain for the last 3 days. . Focused Assessment:    Musculoskeletal - Musculoskeletal WDL: .WDL except (LUQ abdominal pain)       17:12 Gastrointestinal Gastrointestinal - Gastrointestinal WDL: .WDL except; nausea and vomiting (pt vomited upon arrival) Nausea/Vomiting Interventions: antiemetic; stimuli minimized   Nausea/Vomiting - Nausea/Vomiting Outcome: relief of signs/symptoms        Tests Performed: labs. Abnormal Results:   Labs Ordered and Resulted from Time of ED Arrival Up to the Time of Departure from the ED   BASIC METABOLIC PANEL - Abnormal; Notable for the following components:       Result Value    Sodium 128 (*)     Potassium 3.3 (*)     Chloride 93 (*)     Urea Nitrogen 43 (*)     Creatinine 2.53 (*)     Glucose 125 (*)     GFR Estimate 24 (*)     All other components within normal limits   CBC WITH PLATELETS AND DIFFERENTIAL - Abnormal; Notable for the following components:    Hematocrit 33.3 (*)     Platelet Count 458 (*)     Absolute  Immature Granulocytes 0.1 (*)     All other components within normal limits   HEPATIC FUNCTION PANEL - Abnormal; Notable for the following components:    Protein Total 9.1 (*)     All other components within normal limits   LIPASE - Normal   HCG QUALITATIVE PREGNANCY - Normal   CBC WITH PLATELETS & DIFFERENTIAL    Narrative:     The following orders were created for panel order CBC + differential.  Procedure                               Abnormality         Status                     ---------                               -----------         ------                     CBC with platelets and d...[[id number]]  Abnormal            Final result                 Please view results for these tests on the individual orders.   EXTRA BLUE TOP TUBE   EXTRA RED TOP TUBE   EXTRA GREEN TOP (LITHIUM HEPARIN) TUBE   EXTRA PURPLE TOP TUBE   EXTRA BLUE TOP TUBE   COVID-19 VIRUS (CORONAVIRUS) BY PCR   EXTRA TUBE    Narrative:     The following orders were created for panel order [place] Draw.  Procedure                               Abnormality         Status                     ---------                               -----------         ------                     Extra Blue Top Tube[[id number]]                                                         Extra Blue Top Tube[[id number]]                              Final result               Extra Red Top Tube[[id number]]                               Final result               Extra Green Top (Lithium...[[id number]]                      Final result               Extra Purple Top Tube[[id number]]                            Final result                 Please view results for these tests on the individual orders.     .   Treatments provided: see MAR  Family Comments: family not at bedside  OBS brochure/video discussed/provided to patient:  Yes  ED Medications:   Medications   ondansetron (ZOFRAN) injection 4 mg (4 mg Intravenous Given 8/28/21 1213)   lactated ringers BOLUS 1,000 mL (1,000 mLs  Intravenous New Bag 8/28/21 1542)   haloperidol lactate (HALDOL) injection 2.5 mg (2.5 mg Intravenous Given 8/28/21 1549)   ondansetron (ZOFRAN) injection 4 mg (4 mg Intravenous Given 8/28/21 1544)   diphenhydrAMINE (BENADRYL) injection 25 mg (25 mg Intravenous Given 8/28/21 1542)   famotidine (PEPCID) injection 20 mg (20 mg Intravenous Given 8/28/21 1546)     Drips infusing:  No  For the majority of the shift, the patient's behavior Green. Interventions performed were N/A.    Sepsis treatment initiated: No     Patient tested for COVID 19 prior to admission: YES    ED Nurse Name/Phone Number: Aleyda Jacob RN,   5:16 PM    RECEIVING UNIT ED HANDOFF REVIEW    Above ED Nurse Handoff Report was reviewed:yes  Reviewed by: Wendy Graves RN on August 28, 2021 at 5:48 PM

## 2021-08-28 NOTE — PROGRESS NOTES
ROOM # 201    Living Situation (if not independent, order SW consult): home alone   Facility name: NA  : brother     Activity level at baseline: indep  Activity level on admit: SBA      Patient registered to observation; given Patient Bill of Rights; given the opportunity to ask questions about observation status and their plan of care.  Patient has been oriented to the observation room, bathroom and call light is in place.    Discussed discharge goals and expectations with patient/family.

## 2021-08-28 NOTE — ED TRIAGE NOTES
Pt recently admitted for intractable vomiting, discharged home.  N/v returned withab pain for the last 3 days.

## 2021-08-28 NOTE — ED PROVIDER NOTES
History   Chief Complaint:  Abdominal Pain       HPI   Kym Lemus is a 31 year old female with history of hypertension, diabetes mellitus type 2, acute kidney injury, gastritis, and gastroparesis who presents with abdominal pain. Patient recently was recently admitted to the hospital 4 days ago for 1 day for nausea, vomiting, and abdominal pain. She was discharged on Compazine for her vomiting and Bactrim for her possible UTI. Patient notes she has been taking her medications she was discharged with but was still experiencing low appetite, generalized abdominal pain, flank pain, and nausea. Today, the patient has vomited 3 times and denies any marijuana use. She says she has had these episodes of nausea, vomiting, and abdominal pain before she started smoking cannabis in the past. Of note the patient has an upcoming appointment with her primary care next week and with her gastroenterologist in a month. Of note the patient reports multiple episodes of nausea, vomiting, and abdominal pain this summer.     Review of Systems   Gastrointestinal: Positive for abdominal pain, nausea and vomiting.   Genitourinary: Positive for flank pain.   All other systems reviewed and are negative.      Allergies:  Metformin    Medications:  Norvasc  Atarax  Pamelor  Compazine  Bactrim DS  Effexor-ER  Metamucil  Klor-con M20  Protonix  Zofran  Zyprexa  Lantus SoloStar  Apri    Past Medical History:    Hypertension  Cannabinoid hyperemesis syndrome  Cervical intraepithelial glandular neoplasia  Diabetes mellitus type 2  Gastritis  Gastroparesis  Dehydration  Acute on chronic renal insufficiency  DEREK  Hypokalemia  Hyponatremia  Adjustment disorder with mixed disturbance of emotions and conduct  Elevated lactic acid level     Past Surgical History:    Leep tx, cervical EYAL II    Family History:    Hypertension  Diabetes mellitus type 2  Cataracts  Glaucoma    Social History:  Patient presents with friend.     Physical Exam      Patient Vitals for the past 24 hrs:   BP Temp Temp src Pulse Resp SpO2   08/28/21 1630 (!) 163/111 -- -- 111 -- 99 %   08/28/21 1600 (!) 176/113 -- -- 109 -- 94 %   08/28/21 1550 (!) 172/152 -- -- -- -- --   08/28/21 1205 (!) 185/115 98.6  F (37  C) Oral 118 16 98 %       Physical Exam  Nursing note and vitals reviewed.  HENT:   Mouth/Throat: Moist mucous membranes.   Eyes: EOMI, nonicteric sclera  Cardiovascular: tachycardic, regular rhythm, no murmurs, rubs, or gallops  Pulmonary/Chest: Effort normal and breath sounds normal. No respiratory distress. No wheezes. No rales.   Abdominal: Soft. Mild diffuse TTP, nondistended, no guarding or rigidity.   Musculoskeletal: Normal range of motion.   Neurological: Alert. Moves all extremities spontaneously.   Skin: Skin is warm and dry. No rash noted.   Psychiatric: Normal mood and affect.       Emergency Department Course   Laboratory:  BMP: Sodium 128 (L), Potassium 3.3 (L), Chloride 93 (L), Urea Nitrogen 43 (H), Creatinine 2.53 (H), Glucose 125 (H), GFR Estimate 24 (L) o/w WNL   CBC: WBC 10.4, HGB 11.8,  (H)   Lipase: 106  Hepatic panel: Protein Total 9.1 (H) o/w WNL  HCG qualitative pregnancy: Negative   Asymptomatic COVID: Negative    Emergency Department Course:    Reviewed:  I reviewed nursing notes, vitals, past medical history and care everywhere    Assessments:  1518 I obtained history and examined the patient as noted above.   1600 I rechecked the patient and explained findings.     Consults:   1620 I spoke with Keyana Jones PA-C for Dr. Luna of the hospitalist service regarding patient's presentation, findings, and plan of care. They accepted the patient for admission.     Interventions:  1213 Zofran 4 mg IV  1542 Lactated ringers BOLUS 1,000 mL IV  1542 Benadryl 25 mg IV  1544 Zofran 4 mg IV  1546 Pepcid 20 mg IV  1549 Haldol 2.5 mg IV    Disposition:  The patient was admitted to the hospital under the care of Keyana Luna.        Impression & Plan       Medical Decision Making:  Patient presents with abdominal pain, nausea, vomiting.  This is a chronic complaint for the patient.  Differential includes cyclic vomiting, cannabinoid hyperemesis syndrome, viral gastroparesis, among other etiologies.  Patient was recently admitted for same symptoms.  Labs here suggests recurrent dehydration with hyponatremia as well as acute kidney injury.  Given patient's tenuous renal status, I think readmission is indicated for ongoing rehydration to ensure improvement.  Patient voices frustration with ongoing symptoms.  Expressed difficulty of diagnosis for longstanding problems from the emergency department and that she likely needs to follow with gastroenterology as planned.  Ultimately, patient admitted to our hospitalist service for reasons above.  ANA ROSA Jones, accepts patient for admission.        Covid-19  Kym PICKETT Early was evaluated during a global COVID-19 pandemic, which necessitated consideration that the patient might be at risk for infection with the SARS-CoV-2 virus that causes COVID-19.   Applicable protocols for evaluation were followed during the patient's care.   COVID-19 was considered as part of the patient's evaluation. The plan for testing is:  a test was obtained during this visit.    Diagnosis:    ICD-10-CM    1. Acute kidney injury (H)  N17.9    2. Intractable nausea and vomiting  R11.2        Scribe Disclosure:  I, Sharon Luna, am serving as a scribe at 3:18 PM on 8/28/2021 to document services personally performed by Maldonado Conner MD based on my observations and the provider's statements to me.            Maldonado Conner MD  08/28/21 2016

## 2021-08-29 VITALS
BODY MASS INDEX: 25.19 KG/M2 | WEIGHT: 151.4 LBS | SYSTOLIC BLOOD PRESSURE: 148 MMHG | TEMPERATURE: 97.2 F | OXYGEN SATURATION: 100 % | DIASTOLIC BLOOD PRESSURE: 104 MMHG | HEART RATE: 108 BPM | RESPIRATION RATE: 16 BRPM

## 2021-08-29 LAB
ANION GAP SERPL CALCULATED.3IONS-SCNC: 7 MMOL/L (ref 3–14)
BUN SERPL-MCNC: 28 MG/DL (ref 7–30)
CALCIUM SERPL-MCNC: 8.6 MG/DL (ref 8.5–10.1)
CHLORIDE BLD-SCNC: 104 MMOL/L (ref 94–109)
CO2 SERPL-SCNC: 24 MMOL/L (ref 20–32)
CREAT SERPL-MCNC: 1.82 MG/DL (ref 0.52–1.04)
ERYTHROCYTE [DISTWIDTH] IN BLOOD BY AUTOMATED COUNT: 11.7 % (ref 10–15)
GFR SERPL CREATININE-BSD FRML MDRD: 36 ML/MIN/1.73M2
GLUCOSE BLD-MCNC: 96 MG/DL (ref 70–99)
HCT VFR BLD AUTO: 29.8 % (ref 35–47)
HGB BLD-MCNC: 10.3 G/DL (ref 11.7–15.7)
MCH RBC QN AUTO: 27.6 PG (ref 26.5–33)
MCHC RBC AUTO-ENTMCNC: 34.6 G/DL (ref 31.5–36.5)
MCV RBC AUTO: 80 FL (ref 78–100)
PLATELET # BLD AUTO: 370 10E3/UL (ref 150–450)
POTASSIUM BLD-SCNC: 3.6 MMOL/L (ref 3.4–5.3)
RBC # BLD AUTO: 3.73 10E6/UL (ref 3.8–5.2)
SODIUM SERPL-SCNC: 135 MMOL/L (ref 133–144)
WBC # BLD AUTO: 8.2 10E3/UL (ref 4–11)

## 2021-08-29 PROCEDURE — 36415 COLL VENOUS BLD VENIPUNCTURE: CPT | Performed by: PHYSICIAN ASSISTANT

## 2021-08-29 PROCEDURE — G0378 HOSPITAL OBSERVATION PER HR: HCPCS

## 2021-08-29 PROCEDURE — 99217 PR OBSERVATION CARE DISCHARGE: CPT | Performed by: INTERNAL MEDICINE

## 2021-08-29 PROCEDURE — 250N000013 HC RX MED GY IP 250 OP 250 PS 637: Performed by: PHYSICIAN ASSISTANT

## 2021-08-29 PROCEDURE — 80048 BASIC METABOLIC PNL TOTAL CA: CPT | Performed by: PHYSICIAN ASSISTANT

## 2021-08-29 PROCEDURE — 85027 COMPLETE CBC AUTOMATED: CPT | Performed by: PHYSICIAN ASSISTANT

## 2021-08-29 RX ADMIN — AMLODIPINE BESYLATE 10 MG: 10 TABLET ORAL at 07:49

## 2021-08-29 RX ADMIN — VENLAFAXINE HYDROCHLORIDE 37.5 MG: 37.5 CAPSULE, EXTENDED RELEASE ORAL at 07:49

## 2021-08-29 NOTE — PLAN OF CARE
PRIMARY DIAGNOSIS: NAUSEA/VOMITING    OUTPATIENT/OBSERVATION GOALS TO BE MET BEFORE DISCHARGE  1. Orthostatic performed: N/A    2. Tolerating PO fluid and/or antibiotics (if applicable): Yes    3. Nausea/Vomiting/Diarrhea symptoms improved: Yes    4. Pain status: Pain free.    5. Return to near baseline physical activity: No    Pt is A&O x4. VSS. Denies pain, reports slight abdominal discomfort. Reports minimal nausea, declines intervention.  mL/hr. SBA. Tolerating clears. Pt is very sleepy. Will provide supportive care.    Discharge Planner Nurse   Safe discharge environment identified: Yes  Barriers to discharge: Yes       Entered by: Sally Burnette 08/28/2021 11:51 PM     Please review provider order for any additional goals.   Nurse to notify provider when observation goals have been met and patient is ready for discharge.

## 2021-08-29 NOTE — PROGRESS NOTES
Patient's After Visit Summary was reviewed with patient: YES  Patient verbalized understanding of After Visit Summary, recommended follow up and was given an opportunity to ask questions.   Discharge medications sent home with patient/family: NA  Discharged with significant other    OBSERVATION patient END time: 1882

## 2021-08-29 NOTE — PLAN OF CARE
PRIMARY DIAGNOSIS: NAUSEA/VOMITING     OUTPATIENT/OBSERVATION GOALS TO BE MET BEFORE DISCHARGE  1. Orthostatic performed: N/A     2. Tolerating PO fluid and/or antibiotics (if applicable): Yes     3. Nausea/Vomiting/Diarrhea symptoms improved: Yes     4. Pain status: Pain free.     5. Return to near baseline physical activity: Yes       BP (!) 148/104 (BP Location: Left arm)   Pulse 108   Temp 97.2  F (36.2  C) (Axillary)   Resp 16   Wt 68.7 kg (151 lb 6.4 oz)   LMP 08/17/2021 (Approximate)   SpO2 100%   BMI 25.19 kg/m      Vitals stable. Pt alert and oriented x4. Independently moving. Denies abdominal pain. Denies nausea. Tolerating regular diet. Taking a hot shower this am. Will continue to monitor and provide supportive cares.     Discharge Planner Nurse   Safe discharge environment identified: Yes  Barriers to discharge: Not once medically cleared       Entered by: Brigid Lo  Please review provider order for any additional goals.   Nurse to notify provider when observation goals have been met and patient is ready for discharge.

## 2021-08-29 NOTE — DISCHARGE SUMMARY
[facility]  Discharge Summary  Name: Kym Lemus    MRN: [id number]  YOB: 1990    Age: 31 year old  Date of Discharge:  8/29/2021  Date of Admission: 8/28/2021  Primary Care Provider: [facility]  Discharge Physician:  Carmelita Luna MD  Discharging Service:  Hospitalist      Discharge Diagnoses:  1.  Acute on chronic kidney disease  2.  Intractable nausea and vomiting  3.  Hyponatremia and hypokalemia  4.  Hypertension  5.  Diet-controlled type 2 diabetes  6.  Chronic abdominal pain     Follow-ups Needed After Discharge   Follow-up with PCP within 1 week, recommend repeat BMP at that time    Unresulted Labs Ordered in the Past 30 Days of this Admission     No orders found from 10/16/2018 to 12/16/2018.        Hospital Course:  Kym Lemus is a 31 year old female with a PMHx significant for diet controlled DM, probable cyclic vomiting syndrome, hx of marijuana use raising concern for cannabinoid hyperemesis (no recent use), chronic abdominal pain, CKD, and HTN who was admitted on 8/28/2021 with intractable nausea and vomiting and was found to have acute on chronic kidney disease, hyponatremia and hypokalemia.     1. Acute on chronic kidney disease - baseline Cr is likely around 1.5 but has been quite labile recently. Cr was 1.98 on previous discharge, now 2.53 with BUN elevated at 43, likely prerenal component. In 7/2021 she was seen by nephrology and looks to have been evaluated for autoimmune processes and only found to have elevated ROBIN 1:160 prompting porphyrin and extractable nuclear antigen testing both of which came back wnl. Otherwise her DS DNA/free light chains/C4/C3/neutrophil cytoplasmic ab all negative or wnl.  She was treated with IV fluids and on the morning of discharge her creatinine had improved to 1.82.  Recommend that she follow-up with her primary care doctor within 1 week with repeat labs.     2. Intractable nausea / vomiting, hx of  cyclic vomiting - pt denies any specific trigger. Denies marijuana use for past 8 months. Was seen by neurology and started on nortriptyline for chronic abd pain. States sx are not improved from previous hospitalization, was able to keep water down until the day of admission.  She is feeling much better on the day of discharge.  She was able to tolerate clear liquids as well as some soft foods.  She notes that the antiemetics do not seem to help.  Discussed trying sea bands or josef candies or drinks as a more natural way to help her nausea.     3. Hyponatremia / hypokalemia - due to N/V and kidney disease.  Replaced potassium and with IV fluids and oral intake her sodium also normalized.    4. HTN - managed with amlodipine, pt has reported in the past that she does not take this at home and her BP is only elevated during episodes of N/V, she endorses taking it currently and this was continued.    5. Diet controlled DM - glucose 125, HgbA1c 6.4 on 8/24/21.  Continue follow-up with primary care.    6. Chronic abd pain - stress has been a trigger in the past, fam hx of migraines. Was seen by neurology and started on nortriptyline with instructions to titrate, now on 30 mg.      Discharge Disposition:  Discharged to home     Allergies:  Allergies   Allergen Reactions     Metformin Diarrhea        Condition on Discharge:  Discharge condition: Stable   Discharge vitals: Blood pressure (!) 148/104, pulse 108, temperature 97.2  F (36.2  C), temperature source Axillary, resp. rate 16, weight 68.7 kg (151 lb 6.4 oz), last menstrual period 08/17/2021, SpO2 100 %, not currently breastfeeding.   Code status on discharge: Full Code     History of Illness:  See detailed admission note for full details.    Physical Exam:  Blood pressure (!) 148/104, pulse 108, temperature 97.2  F (36.2  C), temperature source Axillary, resp. rate 16, weight 68.7 kg (151 lb 6.4 oz), last menstrual period 08/17/2021, SpO2 100 %, not currently  breastfeeding.  Wt Readings from Last 1 Encounters:   08/28/21 68.7 kg (151 lb 6.4 oz)     General: Alert, awake, no acute distress.  HEENT: Normocephalic, atraumatic, eyes anicteric and without scleral injection, EOMI, MMM.  Cardiac: RRR, normal S1, S2.  No m/g/r. No LE edema.  Pulmonary: Normal chest rise, normal work of breathing.  Lungs CTAB without crackles or wheezing  Abdomen: soft, non-tender, non-distended.  Normoactive BS.  No guarding or rebound tenderness.  Extremities: no deformities.  Warm, well perfused.  Skin: no rashes or lesions noted.  Warm and Dry.  Neuro: No focal deficits noted.  Speech clear.  Coordination and strength grossly normal.  Psych: Appropriate affect. Alert and oriented x3    Procedures other than Imaging:  Phlebotomy     Imaging:  Results for orders placed or performed during the hospital encounter of 05/13/21   CT Abdomen Pelvis w/o Contrast    Narrative    EXAM: CT ABDOMEN PELVIS W/O CONTRAST  LOCATION: [facility]  DATE/TIME: 5/13/2021 7:37 PM    INDICATION: Abdominal pain, acute, nonlocalized  COMPARISON: 9/11/2018  TECHNIQUE: CT scan of the abdomen and pelvis was performed without IV contrast. Multiplanar reformats were obtained. Dose reduction techniques were used.  CONTRAST: None.    FINDINGS:   LOWER CHEST: Normal.    HEPATOBILIARY: Normal.    PANCREAS: Normal.    SPLEEN: Normal.    ADRENAL GLANDS: Normal.    KIDNEYS/BLADDER: Normal.    BOWEL: No obstruction or inflammatory change. Appendix normal.    LYMPH NODES: Normal.    VASCULATURE: Unremarkable.    PELVIC ORGANS: Vaginal collection device, surrounds cervix. No adnexal lesions. Small calcified uterine fibroids.    MUSCULOSKELETAL: No suspicious bony lesions. Significant interval weight loss.      Impression    IMPRESSION:   1.  No etiology for symptoms evident. Nothing obstructive or inflammatory.            Consultations:  Consultations This Hospital Stay   None     Recent Lab Results:  Recent Labs   Lab  08/29/21  0634 08/28/21  1210 08/25/21  0904   WBC 8.2 10.4 11.9*   HGB 10.3* 11.8 11.5*   HCT 29.8* 33.3* 34.3*   MCV 80 79 84    458* 414     Recent Labs   Lab 08/29/21  0634 08/28/21  1210 08/25/21  0904    128* 136   POTASSIUM 3.6 3.3* 3.7   CHLORIDE 104 93* 105   CO2 24 23 22   ANIONGAP 7 12 9   GLC 96 125* 138*   BUN 28 43* 38*   CR 1.82* 2.53* 1.98*   GFRESTIMATED 36* 24* 32*   DIMITRI 8.6 9.5 8.6          Pending Results:    Unresulted Labs Ordered in the Past 30 Days of this Admission     No orders found for last 31 day(s).           Discharge Instructions and Follow-Up:   Discharge Orders      Reason for your hospital stay    You were hospitalized for nausea and vomiting and were found to have low sodium and elevated kidney function which has improved with fluids.  You are now tolerating a diet and can discharge home.  You can try ginger or sea band bracelets to help with nausea and well.     Follow-up and recommended labs and tests     Follow up with primary care provider, [facility], within 7 days for hospital follow- up.  The following labs/tests are recommended: BMP.     Activity    Your activity upon discharge: activity as tolerated     Diet    Follow this diet upon discharge: Orders Placed This Encounter      Advance Diet as Tolerated: Regular Diet Adult; Regular Diet Adult     Discharge Medications   Current Discharge Medication List      CONTINUE these medications which have NOT CHANGED    Details   amLODIPine (NORVASC) 10 MG tablet Take 1 tablet (10 mg) by mouth daily  Qty: 30 tablet, Refills: 0    Associated Diagnoses: Essential hypertension      hydrOXYzine (ATARAX) 25 MG tablet Take 1 tablet (25 mg) by mouth every 6 hours as needed for anxiety  Qty: 60 tablet, Refills: 1    Associated Diagnoses: Adjustment disorder with mixed disturbance of emotions and conduct      nortriptyline (PAMELOR) 10 MG capsule Take 30 mg by mouth At Bedtime       prochlorperazine  (COMPAZINE) 10 MG tablet Take 1 tablet (10 mg) by mouth every 6 hours as needed for nausea or vomiting  Qty: 10 tablet, Refills: 0    Associated Diagnoses: Vomiting, intractability of vomiting not specified, presence of nausea not specified, unspecified vomiting type      sulfamethoxazole-trimethoprim (BACTRIM DS) 800-160 MG tablet Take 1 tablet by mouth 2 times daily  Qty: 10 tablet, Refills: 0    Associated Diagnoses: Urinary tract infection with hematuria, site unspecified      venlafaxine (EFFEXOR-ER) 37.5 MG 24 hr tablet Take 1 tablet (37.5 mg) by mouth daily (with breakfast)  Qty: 30 tablet, Refills: 0    Associated Diagnoses: Adjustment disorder with mixed disturbance of emotions and conduct      latanoprost (XALATAN) 0.005 % ophthalmic solution Place 1 drop into both eyes At Bedtime             Time Spent on this Encounter   I, Carmelita Luna MD, personally saw the patient today and spent greater than 30 minutes discharging this patient.    Carmelita Luna MD

## 2021-08-29 NOTE — PHARMACY-ADMISSION MEDICATION HISTORY
Admission medication history interview status for this patient is complete. See [facility] admission navigator for allergy information, prior to admission medications and immunization status.     Medication history interview done, indicate source(s): Patient  Medication history resources (including written lists, pill bottles, clinic record):[facility] list  Pharmacy: [facility]     Changes made to PTA medication list:  Added: none  Changed: none  Reported as Not Taking: none  Removed: none    Actions taken by pharmacist (provider contacted, etc):None     Additional medication history information:None    Medication reconciliation/reorder completed by provider prior to medication history?  Y   (Y/N)           Prior to Admission medications    Medication Sig Last Dose Taking? Auth Provider   amLODIPine (NORVASC) 10 MG tablet Take 1 tablet (10 mg) by mouth daily 8/28/2021 at am Yes Ty Arce MD   hydrOXYzine (ATARAX) 25 MG tablet Take 1 tablet (25 mg) by mouth every 6 hours as needed for anxiety 8/27/2021 at pm Yes Jerald Giordano APRN CNP   nortriptyline (PAMELOR) 10 MG capsule Take 30 mg by mouth At Bedtime  8/27/2021 at HS Yes Reported, Patient   prochlorperazine (COMPAZINE) 10 MG tablet Take 1 tablet (10 mg) by mouth every 6 hours as needed for nausea or vomiting 8/28/2021 at Unknown time Yes Jerald Giordano APRN CNP   sulfamethoxazole-trimethoprim (BACTRIM DS) 800-160 MG tablet Take 1 tablet by mouth 2 times daily 8/28/2021 at am Yes Jerald Giordano APRN CNP   venlafaxine (EFFEXOR-ER) 37.5 MG 24 hr tablet Take 1 tablet (37.5 mg) by mouth daily (with breakfast) 8/28/2021 at am Yes Jerald Giordano APRN CNP   latanoprost (XALATAN) 0.005 % ophthalmic solution Place 1 drop into both eyes At Bedtime 8/26/2021 at HS  Unknown, Entered By History

## 2021-08-29 NOTE — PLAN OF CARE
PRIMARY DIAGNOSIS: NAUSEA/VOMITING    OUTPATIENT/OBSERVATION GOALS TO BE MET BEFORE DISCHARGE  1. Orthostatic performed: N/A    2. Tolerating PO fluid and/or antibiotics (if applicable): Yes    3. Nausea/Vomiting/Diarrhea symptoms improved: Yes    4. Pain status: Pain free.    5. Return to near baseline physical activity: Yes    Pt is A&O x4. VSS. Denies pain.. Reports minimal nausea, declines intervention.  mL/hr. SBA. Tolerating clears. Pt has slept most of shift. Will provide supportive care.    Discharge Planner Nurse   Safe discharge environment identified: Yes  Barriers to discharge: Yes       Entered by: Sally Burnette 08/29/2021 4:57 AM     Please review provider order for any additional goals.   Nurse to notify provider when observation goals have been met and patient is ready for discharge.

## 2021-08-29 NOTE — PLAN OF CARE
PRIMARY DIAGNOSIS: NAUSEA/VOMITING    OUTPATIENT/OBSERVATION GOALS TO BE MET BEFORE DISCHARGE  1. Orthostatic performed: N/A    2. Tolerating PO fluid and/or antibiotics (if applicable): Yes    3. Nausea/Vomiting/Diarrhea symptoms improved: Yes    4. Pain status: Pain free. Slight abdominal discomfort    5. Return to near baseline physical activity: No    Pt is A&O x4. VSS. Denies pain, reports slight abdominal discomfort.  mL/hr. SBA. Tolerating clears. Pt is very sleepy. Will provide supportive care.    Discharge Planner Nurse   Safe discharge environment identified: Yes  Barriers to discharge: Yes       Entered by: Sally Burnette 08/28/2021 7:44 PM     Please review provider order for any additional goals.   Nurse to notify provider when observation goals have been met and patient is ready for discharge.

## 2021-09-26 ENCOUNTER — HOSPITAL ENCOUNTER (EMERGENCY)
Facility: CLINIC | Age: 31
Discharge: HOME | End: 2021-09-26
Attending: EMERGENCY MEDICINE | Admitting: EMERGENCY MEDICINE
Payer: COMMERCIAL

## 2021-09-26 VITALS
DIASTOLIC BLOOD PRESSURE: 106 MMHG | BODY MASS INDEX: 28.32 KG/M2 | OXYGEN SATURATION: 99 % | HEART RATE: 99 BPM | TEMPERATURE: 97.6 F | RESPIRATION RATE: 20 BRPM | HEIGHT: 65 IN | SYSTOLIC BLOOD PRESSURE: 161 MMHG | WEIGHT: 170 LBS

## 2021-09-26 DIAGNOSIS — G89.29 CHRONIC ABDOMINAL PAIN: ICD-10-CM

## 2021-09-26 DIAGNOSIS — R10.9 CHRONIC ABDOMINAL PAIN: ICD-10-CM

## 2021-09-26 DIAGNOSIS — R10.13 EPIGASTRIC PAIN: ICD-10-CM

## 2021-09-26 DIAGNOSIS — R11.15 CYCLIC VOMITING SYNDROME: ICD-10-CM

## 2021-09-26 LAB
ALBUMIN SERPL-MCNC: 4.5 G/DL (ref 3.4–5)
ALBUMIN UR-MCNC: 20 MG/DL
ALP SERPL-CCNC: 107 U/L (ref 40–150)
ALT SERPL W P-5'-P-CCNC: 16 U/L (ref 0–50)
ANION GAP SERPL CALCULATED.3IONS-SCNC: 11 MMOL/L (ref 3–14)
APPEARANCE UR: CLEAR
AST SERPL W P-5'-P-CCNC: 19 U/L (ref 0–45)
B-HCG FREE SERPL-ACNC: <5 IU/L (ref 0–5)
BASOPHILS # BLD AUTO: 0 10E3/UL (ref 0–0.2)
BASOPHILS NFR BLD AUTO: 0 %
BILIRUB SERPL-MCNC: 0.3 MG/DL (ref 0.2–1.3)
BILIRUB UR QL STRIP: NEGATIVE
BUN SERPL-MCNC: 25 MG/DL (ref 7–30)
CALCIUM SERPL-MCNC: 9.6 MG/DL (ref 8.5–10.1)
CHLORIDE BLD-SCNC: 107 MMOL/L (ref 94–109)
CO2 SERPL-SCNC: 21 MMOL/L (ref 20–32)
COLOR UR AUTO: ABNORMAL
CREAT SERPL-MCNC: 1.57 MG/DL (ref 0.52–1.04)
EOSINOPHIL # BLD AUTO: 0 10E3/UL (ref 0–0.7)
EOSINOPHIL NFR BLD AUTO: 0 %
ERYTHROCYTE [DISTWIDTH] IN BLOOD BY AUTOMATED COUNT: 12.6 % (ref 10–15)
GFR SERPL CREATININE-BSD FRML MDRD: 43 ML/MIN/1.73M2
GLUCOSE BLD-MCNC: 165 MG/DL (ref 70–99)
GLUCOSE BLDC GLUCOMTR-MCNC: 154 MG/DL (ref 70–99)
GLUCOSE UR STRIP-MCNC: 30 MG/DL
HCT VFR BLD AUTO: 35.7 % (ref 35–47)
HGB BLD-MCNC: 11.6 G/DL (ref 11.7–15.7)
HGB UR QL STRIP: NEGATIVE
HOLD SPECIMEN: NORMAL
HOLD SPECIMEN: NORMAL
IMM GRANULOCYTES # BLD: 0 10E3/UL
IMM GRANULOCYTES NFR BLD: 0 %
KETONES BLD-SCNC: 0.3 MMOL/L (ref 0–0.6)
KETONES UR STRIP-MCNC: 10 MG/DL
LACTATE SERPL-SCNC: 3.5 MMOL/L (ref 0.7–2)
LACTATE SERPL-SCNC: 3.7 MMOL/L (ref 0.7–2)
LEUKOCYTE ESTERASE UR QL STRIP: NEGATIVE
LIPASE SERPL-CCNC: 129 U/L (ref 73–393)
LYMPHOCYTES # BLD AUTO: 1.1 10E3/UL (ref 0.8–5.3)
LYMPHOCYTES NFR BLD AUTO: 13 %
MCH RBC QN AUTO: 28 PG (ref 26.5–33)
MCHC RBC AUTO-ENTMCNC: 32.5 G/DL (ref 31.5–36.5)
MCV RBC AUTO: 86 FL (ref 78–100)
MONOCYTES # BLD AUTO: 0.4 10E3/UL (ref 0–1.3)
MONOCYTES NFR BLD AUTO: 4 %
MUCOUS THREADS #/AREA URNS LPF: PRESENT /LPF
NEUTROPHILS # BLD AUTO: 7.1 10E3/UL (ref 1.6–8.3)
NEUTROPHILS NFR BLD AUTO: 83 %
NITRATE UR QL: NEGATIVE
NRBC # BLD AUTO: 0 10E3/UL
NRBC BLD AUTO-RTO: 0 /100
PH UR STRIP: 7 [PH] (ref 5–7)
PLATELET # BLD AUTO: 433 10E3/UL (ref 150–450)
POTASSIUM BLD-SCNC: 3.7 MMOL/L (ref 3.4–5.3)
PROT SERPL-MCNC: 8.7 G/DL (ref 6.8–8.8)
RBC # BLD AUTO: 4.15 10E6/UL (ref 3.8–5.2)
RBC URINE: 0 /HPF
SODIUM SERPL-SCNC: 139 MMOL/L (ref 133–144)
SP GR UR STRIP: 1.01 (ref 1–1.03)
SQUAMOUS EPITHELIAL: 3 /HPF
UROBILINOGEN UR STRIP-MCNC: NORMAL MG/DL
WBC # BLD AUTO: 8.6 10E3/UL (ref 4–11)
WBC URINE: <1 /HPF

## 2021-09-26 PROCEDURE — 250N000013 HC RX MED GY IP 250 OP 250 PS 637: Performed by: EMERGENCY MEDICINE

## 2021-09-26 PROCEDURE — 83690 ASSAY OF LIPASE: CPT | Performed by: EMERGENCY MEDICINE

## 2021-09-26 PROCEDURE — 82040 ASSAY OF SERUM ALBUMIN: CPT | Performed by: EMERGENCY MEDICINE

## 2021-09-26 PROCEDURE — 250N000011 HC RX IP 250 OP 636: Performed by: EMERGENCY MEDICINE

## 2021-09-26 PROCEDURE — 250N000009 HC RX 250: Performed by: EMERGENCY MEDICINE

## 2021-09-26 PROCEDURE — 99285 EMERGENCY DEPT VISIT HI MDM: CPT | Mod: 25

## 2021-09-26 PROCEDURE — 96375 TX/PRO/DX INJ NEW DRUG ADDON: CPT

## 2021-09-26 PROCEDURE — 82010 KETONE BODYS QUAN: CPT | Performed by: EMERGENCY MEDICINE

## 2021-09-26 PROCEDURE — 96374 THER/PROPH/DIAG INJ IV PUSH: CPT | Mod: 59

## 2021-09-26 PROCEDURE — 36415 COLL VENOUS BLD VENIPUNCTURE: CPT | Performed by: EMERGENCY MEDICINE

## 2021-09-26 PROCEDURE — 81001 URINALYSIS AUTO W/SCOPE: CPT | Performed by: EMERGENCY MEDICINE

## 2021-09-26 PROCEDURE — 258N000003 HC RX IP 258 OP 636: Performed by: EMERGENCY MEDICINE

## 2021-09-26 PROCEDURE — 96361 HYDRATE IV INFUSION ADD-ON: CPT

## 2021-09-26 PROCEDURE — 85025 COMPLETE CBC W/AUTO DIFF WBC: CPT | Performed by: EMERGENCY MEDICINE

## 2021-09-26 PROCEDURE — 83605 ASSAY OF LACTIC ACID: CPT | Performed by: EMERGENCY MEDICINE

## 2021-09-26 PROCEDURE — 84702 CHORIONIC GONADOTROPIN TEST: CPT

## 2021-09-26 PROCEDURE — 93005 ELECTROCARDIOGRAM TRACING: CPT

## 2021-09-26 RX ORDER — DROPERIDOL 2.5 MG/ML
1.25 INJECTION, SOLUTION INTRAMUSCULAR; INTRAVENOUS ONCE
Status: DISCONTINUED | OUTPATIENT
Start: 2021-09-26 | End: 2021-09-26

## 2021-09-26 RX ORDER — FAMOTIDINE 20 MG/1
20 TABLET, FILM COATED ORAL 2 TIMES DAILY
Qty: 30 TABLET | Refills: 0 | Status: ON HOLD | OUTPATIENT
Start: 2021-09-26 | End: 2021-10-07

## 2021-09-26 RX ORDER — CAPSAICIN 0.025 %
CREAM (GRAM) TOPICAL ONCE
Status: COMPLETED | OUTPATIENT
Start: 2021-09-26 | End: 2021-09-26

## 2021-09-26 RX ORDER — LABETALOL HYDROCHLORIDE 5 MG/ML
10 INJECTION, SOLUTION INTRAVENOUS ONCE
Status: DISCONTINUED | OUTPATIENT
Start: 2021-09-26 | End: 2021-09-26 | Stop reason: HOSPADM

## 2021-09-26 RX ORDER — ONDANSETRON 2 MG/ML
4 INJECTION INTRAMUSCULAR; INTRAVENOUS ONCE
Status: COMPLETED | OUTPATIENT
Start: 2021-09-26 | End: 2021-09-26

## 2021-09-26 RX ORDER — LORAZEPAM 2 MG/ML
0.5 INJECTION INTRAMUSCULAR
Status: COMPLETED | OUTPATIENT
Start: 2021-09-26 | End: 2021-09-26

## 2021-09-26 RX ORDER — SUCRALFATE 1 G/1
1 TABLET ORAL 4 TIMES DAILY
Qty: 30 TABLET | Refills: 0 | Status: ON HOLD | OUTPATIENT
Start: 2021-09-26 | End: 2021-10-07

## 2021-09-26 RX ORDER — ONDANSETRON 4 MG/1
4 TABLET, ORALLY DISINTEGRATING ORAL ONCE
Status: COMPLETED | OUTPATIENT
Start: 2021-09-26 | End: 2021-09-26

## 2021-09-26 RX ORDER — HYDROMORPHONE HYDROCHLORIDE 1 MG/ML
0.5 INJECTION, SOLUTION INTRAMUSCULAR; INTRAVENOUS; SUBCUTANEOUS
Status: DISCONTINUED | OUTPATIENT
Start: 2021-09-26 | End: 2021-09-26

## 2021-09-26 RX ADMIN — ONDANSETRON 4 MG: 2 INJECTION INTRAMUSCULAR; INTRAVENOUS at 16:00

## 2021-09-26 RX ADMIN — ONDANSETRON 4 MG: 4 TABLET, ORALLY DISINTEGRATING ORAL at 19:24

## 2021-09-26 RX ADMIN — SODIUM CHLORIDE 1000 ML: 9 INJECTION, SOLUTION INTRAVENOUS at 15:59

## 2021-09-26 RX ADMIN — LORAZEPAM 0.5 MG: 2 INJECTION INTRAMUSCULAR; INTRAVENOUS at 17:21

## 2021-09-26 RX ADMIN — HYDROMORPHONE HYDROCHLORIDE 0.5 MG: 1 INJECTION, SOLUTION INTRAMUSCULAR; INTRAVENOUS; SUBCUTANEOUS at 16:02

## 2021-09-26 RX ADMIN — SODIUM CHLORIDE 1000 ML: 9 INJECTION, SOLUTION INTRAVENOUS at 18:18

## 2021-09-26 RX ADMIN — CAPSAICIN 1 G: 0.25 CREAM TOPICAL at 17:16

## 2021-09-26 RX ADMIN — FAMOTIDINE 20 MG: 10 INJECTION, SOLUTION INTRAVENOUS at 16:06

## 2021-09-26 RX ADMIN — LIDOCAINE HYDROCHLORIDE 15 ML: 20 SOLUTION ORAL; TOPICAL at 19:16

## 2021-09-26 ASSESSMENT — ENCOUNTER SYMPTOMS
ABDOMINAL PAIN: 1
BACK PAIN: 1
NAUSEA: 1
VOMITING: 1

## 2021-09-26 ASSESSMENT — MIFFLIN-ST. JEOR: SCORE: 1476.99

## 2021-09-26 NOTE — ED PROVIDER NOTES
History     Chief Complaint:  Abdominal Pain      BITA Lemus is a 31 year old female with a medical history of type 2 diabetes mellitus, hypertension, cannabinoid hyperemesis syndrome, gastritis, gastroparesis, and DEREK who presents with abdominal pain. Last night (09/25/21), the patient had an onset of vomiting. Then today, she developed generalized abdominal pain that radiates to her back. Here, the patient is actively vomiting into an emesis bag. She denies any recent dialysis.     Review of Systems   Gastrointestinal: Positive for abdominal pain, nausea and vomiting.   Musculoskeletal: Positive for back pain.   All other systems reviewed and are negative.    Allergies:  Metformin      Medications:    amLODIPine (NORVASC) 10 MG tablet  hydrOXYzine (ATARAX) 25 MG tablet  latanoprost (XALATAN) 0.005 % ophthalmic solution  nortriptyline (PAMELOR) 10 MG capsule  prochlorperazine (COMPAZINE) 10 MG tablet  venlafaxine (EFFEXOR-ER) 37.5 MG 24 hr tablet      Past Medical History:    Benign essential hypertension   Cannabinoid hyperemesis syndrome   Cervical intraepithelial glandular neoplasia   Diabetes mellitus type 2, diet-controlled   Gastritis   Gastroparesis   Intractable nausea and vomiting   Abdominal pain, epigastric   Dehydration   Acute on chronic renal insufficiency   DEREK (acute kidney injury)  Vomiting, intractability of vomiting not specified, presence of nausea not specified, unspecified vomiting type   Hypokalemia   Hyponatremia   Non-intractable vomiting with nausea, unspecified vomiting type   Marijuana use   HTN (hypertension)   Adjustment disorder with mixed disturbance of emotions and conduct   Elevated lactic acid level    Past Surgical History:    LEEP TX, cervical     Family History:    Hypertension (mother)  Type 2 diabetes mellitus (mother)   Cataracts (mother)     Physical Exam     Patient Vitals for the past 24 hrs:   BP Temp Temp src Pulse Resp SpO2 Height Weight   09/26/21 1730  "(!) 187/111 -- -- -- -- 96 % -- --   09/26/21 1645 (!) 178/100 -- -- -- -- 99 % -- --   09/26/21 1630 (!) 180/110 -- -- -- -- 100 % -- --   09/26/21 1615 (!) 182/105 -- -- -- -- 100 % -- --   09/26/21 1600 (!) 194/120 -- -- -- -- 100 % -- --   09/26/21 1545 (!) 186/117 -- -- -- -- -- -- --   09/26/21 1531 (!) 201/120 97.6  F (36.4  C) Temporal 110 20 100 % 1.651 m (5' 5\") 77.1 kg (170 lb)       Physical Exam    Constitutional: Hyperventilating, moaning, clutching emesis bag in bed, but distractable.  HENT:    Nose: Nose normal.    Mouth/Throat: Oropharynx is clear, mucous membranes are moist  Eyes: EOM are normal, anicteric, conjugate gaze  CV: regular rate and rhythm; no murmurs  Chest: Effort normal and breath sounds clear without wheezing or rales, symmetric bilaterally   GI: Generalized abdominal pain. No distension. No guarding or rebound.    MSK: No LE edema, no tenderness to palpation of BLE.  Neurological: Alert, attentive, moving all extremities equally.   Skin: Skin is warm and dry.     Emergency Department Course   ECG:  ECG taken at 1620, ECG read at 1638  Normal sinus rhythm   Nonspecific T-wave abnormality   Abnormal ECG    No significant change as compared to prior, dated 07/16/21.  Rate 95 bpm. WI interval 168 ms. QRS duration 84 ms. QT/QTc 348/4337 ms. P-R-T axes 68 73 37.     Laboratory:  iStat HCG Quantitative Pregnancy, POCT: <5.0 WNL   Glucose by meter: 154 (high)   Ketone Beta Hydroxybutyrate Quantitative: 0.3 WNL   UA with Microscopic Reflex to Culture: Glucose 30 (abnormal), Ketones 10 (abnormal), Protein Albumin 20 (abnormal), Mucus Present (abnormal), Squamous Epithelials 3 (high), o/w WNL   1550: Lactic Acid: 3.5 (high)   1724: Lactic Acid: 3.7 (high)    CBC: HGB 11.6 (low), o/w WNL (WBC 8.6, )  CMP: Cr 1.57 (high), Glu 165 (high), GFR Estimate 43 (low), o/w WNL  Lipase: 129 WNL     Emergency Department Course:    Reviewed:  I reviewed nursing notes, vitals, past history and care " everywhere    Assessments:  1622 I obtained history and examined the patient as noted above.   1734 I rechecked the patient and explained findings.   1830      Interventions:  1559: 0.9% sodium chloride BOLUS 1,000 mL, IV  1600: ondansetron (ZOFRAN) injection 4 mg, IV  1602: HYDROmorphone (PF) (DILAUDID) injection 0.5 mg, IV  1606: famotidine (PEPCID) injection 20 mg, IV   1716: capsaicin (ZOSTRIX) cream 1 g, Topical   1721: LORazepam (ATIVAN) injection 1 mg, IV    Disposition:  Anticipate discharge home, signed out to Dr. Tanner    Impression & Plan    Medical Decision Makin-year-old woman past medical history significant for chronic abdominal pain, concern for cyclic vomiting, gastroparesis, diabetes was had multiple thorough work-ups including multiple CT [facility] presenting for evaluation of severe epigastric pain.  She was in significant distress on arrival, though was distractible in terms of palpation of her abdomen.  She was noted to be significantly hypertension which she has a history of when she presents like this.  She was given IV fluids, several doses of antiemetics, a dose of Dilaudid until it was revealed the chronic pain is on her problem list as well as a dose of Ativan for refractory vomiting along with Pepcid and capsaicin.  Her pain significantly improved to the point she was able to sleep comfortably however when awakened she remained drowsy.  Her labs are notable for elevated lactate however her bicarb is normal I do not suspect ischemic bowel nor hypoperfusion.  I suspect this is a type B lactic elevation.  She was given IV fluids, her labs are otherwise unremarkable and there is no evidence of DKA.  Pending p.o. challenge, patient was signed out to Dr. Tanner, anticipate discharge home.    Diagnosis:    ICD-10-CM    1. Cyclic vomiting syndrome  R11.15    2. Epigastric pain  R10.13    3. Chronic abdominal pain  R10.9     G89.29        Discharge  Medications:  New Prescriptions    FAMOTIDINE (PEPCID) 20 MG TABLET    Take 1 tablet (20 mg) by mouth 2 times daily    SUCRALFATE (CARAFATE) 1 GM TABLET    Take 1 tablet (1 g) by mouth 4 times daily     Eligio Salter MD  [employer]  6:39 PM 09/26/21       Scribe Disclosure:  I, Sandra Rice, am serving as a scribe at 3:34 PM on 9/26/2021 to document services personally performed by Eligio Salter MD based on my observations and the provider's statements to me.      Eligio Salter MD  09/26/21 2660

## 2021-09-26 NOTE — ED NOTES
Patient care signed out to me by Dr. Salter.  Please see his initial ED provider note for HPI, ROS, PE and MDM.    In brief patient is a 31-year-old female with a history of chronic abdominal pain, concern for cyclical vomiting in the setting of cannabis hyperemesis syndrome, gastroparesis, diabetes who presents with vomiting and severe epigastric pain. Patient was p.o. challenging at time of signout.    On recheck patient is extremely tearful. She still complaining of pain and questioning whether she would go home with any medications. I discussed that Dr. Salter had written for famotidine and Carafate. With all the other medication she is currently on it would not be safe to start opiate medications for chronic epigastric discomfort. She was able to tolerate a pack of crackers and water. She held this down even though she had one additional episode of vomiting prior to p.o. challenge. She would like to discharge home although she was offered observation admission at this time. After all questions answered and return precautions understood, discharged home.       ICD-10-CM    1. Cyclic vomiting syndrome  R11.15    2. Epigastric pain  R10.13    3. Chronic abdominal pain  R10.9     G89.29           Jerald Tanner MD  09/26/21 1903

## 2021-09-26 NOTE — ED TRIAGE NOTES
Pt c/o vomiting since last night. Today developed diffuse abdominal pain, that radiates to back. Pt denies dysuria. Normal BM yesterday.

## 2021-09-27 LAB
ATRIAL RATE - MUSE: 95 BPM
DIASTOLIC BLOOD PRESSURE - MUSE: NORMAL MMHG
INTERPRETATION ECG - MUSE: NORMAL
P AXIS - MUSE: 68 DEGREES
PR INTERVAL - MUSE: 168 MS
QRS DURATION - MUSE: 84 MS
QT - MUSE: 348 MS
QTC - MUSE: 437 MS
R AXIS - MUSE: 73 DEGREES
SYSTOLIC BLOOD PRESSURE - MUSE: NORMAL MMHG
T AXIS - MUSE: 37 DEGREES
VENTRICULAR RATE- MUSE: 95 BPM

## 2021-09-27 NOTE — ED NOTES
Had tolerated crackers and ice water earlier during stay in ED.  Encouraged and reinforced patient to keep taking small sips every few minutes t/o the day as has continued to vomit off and on. Zofran ODT given.

## 2021-10-07 ENCOUNTER — HOSPITAL ENCOUNTER (OUTPATIENT)
Facility: CLINIC | Age: 31
Setting detail: OBSERVATION
Discharge: HOME | End: 2021-10-08
Attending: NURSE PRACTITIONER | Admitting: INTERNAL MEDICINE
Payer: COMMERCIAL

## 2021-10-07 ENCOUNTER — APPOINTMENT (OUTPATIENT)
Dept: ULTRASOUND IMAGING | Facility: CLINIC | Age: 31
End: 2021-10-07
Attending: NURSE PRACTITIONER
Payer: COMMERCIAL

## 2021-10-07 DIAGNOSIS — R11.15 CYCLICAL VOMITING: ICD-10-CM

## 2021-10-07 DIAGNOSIS — N17.9 ACUTE KIDNEY FAILURE (H): ICD-10-CM

## 2021-10-07 DIAGNOSIS — R11.2 NAUSEA WITH VOMITING: ICD-10-CM

## 2021-10-07 DIAGNOSIS — R11.2 INTRACTABLE NAUSEA AND VOMITING: Primary | ICD-10-CM

## 2021-10-07 PROBLEM — R00.0 TACHYCARDIA: Status: ACTIVE | Noted: 2018-11-23

## 2021-10-07 PROBLEM — K29.70 GASTRITIS WITHOUT BLEEDING: Status: ACTIVE | Noted: 2018-09-14

## 2021-10-07 LAB
ALBUMIN SERPL-MCNC: 4.8 G/DL (ref 3.4–5)
ALBUMIN UR-MCNC: 30 MG/DL
ALP SERPL-CCNC: 113 U/L (ref 40–150)
ALT SERPL W P-5'-P-CCNC: 22 U/L (ref 0–50)
AMPHETAMINES UR QL SCN: ABNORMAL
ANION GAP SERPL CALCULATED.3IONS-SCNC: 12 MMOL/L (ref 3–14)
APPEARANCE UR: CLEAR
AST SERPL W P-5'-P-CCNC: 23 U/L (ref 0–45)
BARBITURATES UR QL: ABNORMAL
BENZODIAZ UR QL: ABNORMAL
BILIRUB SERPL-MCNC: 0.9 MG/DL (ref 0.2–1.3)
BILIRUB UR QL STRIP: NEGATIVE
BUN SERPL-MCNC: 43 MG/DL (ref 7–30)
CALCIUM SERPL-MCNC: 9.7 MG/DL (ref 8.5–10.1)
CANNABINOIDS UR QL SCN: ABNORMAL
CHLORIDE BLD-SCNC: 92 MMOL/L (ref 94–109)
CO2 SERPL-SCNC: 28 MMOL/L (ref 20–32)
COCAINE UR QL: ABNORMAL
COLOR UR AUTO: ABNORMAL
CREAT SERPL-MCNC: 2.26 MG/DL (ref 0.52–1.04)
ERYTHROCYTE [DISTWIDTH] IN BLOOD BY AUTOMATED COUNT: 12.4 % (ref 10–15)
GFR SERPL CREATININE-BSD FRML MDRD: 28 ML/MIN/1.73M2
GLUCOSE BLD-MCNC: 163 MG/DL (ref 70–99)
GLUCOSE UR STRIP-MCNC: NEGATIVE MG/DL
HCO3 BLDV-SCNC: 27 MMOL/L (ref 21–28)
HCT VFR BLD AUTO: 37.4 % (ref 35–47)
HGB BLD-MCNC: 12.5 G/DL (ref 11.7–15.7)
HGB UR QL STRIP: NEGATIVE
KETONES BLD-SCNC: 0.4 MMOL/L (ref 0–0.6)
KETONES UR STRIP-MCNC: NEGATIVE MG/DL
LACTATE BLD-SCNC: 2.8 MMOL/L
LACTATE SERPL-SCNC: 1.2 MMOL/L (ref 0.7–2)
LACTATE SERPL-SCNC: 2.6 MMOL/L (ref 0.7–2)
LEUKOCYTE ESTERASE UR QL STRIP: NEGATIVE
LIPASE SERPL-CCNC: 132 U/L (ref 73–393)
MCH RBC QN AUTO: 28.2 PG (ref 26.5–33)
MCHC RBC AUTO-ENTMCNC: 33.4 G/DL (ref 31.5–36.5)
MCV RBC AUTO: 84 FL (ref 78–100)
NITRATE UR QL: NEGATIVE
OPIATES UR QL SCN: ABNORMAL
PCO2 BLDV: 33 MM HG (ref 40–50)
PH BLDV: 7.52 [PH] (ref 7.32–7.43)
PH UR STRIP: 7 [PH] (ref 5–7)
PLATELET # BLD AUTO: 463 10E3/UL (ref 150–450)
PO2 BLDV: 30 MM HG (ref 25–47)
POTASSIUM BLD-SCNC: 3.6 MMOL/L (ref 3.4–5.3)
PROT SERPL-MCNC: 9.4 G/DL (ref 6.8–8.8)
RBC # BLD AUTO: 4.43 10E6/UL (ref 3.8–5.2)
RBC URINE: 0 /HPF
SAO2 % BLDV: 65 % (ref 94–100)
SODIUM SERPL-SCNC: 132 MMOL/L (ref 133–144)
SODIUM UR-SCNC: 91 MMOL/L
SP GR UR STRIP: 1.02 (ref 1–1.03)
SQUAMOUS EPITHELIAL: 3 /HPF
UROBILINOGEN UR STRIP-MCNC: NORMAL MG/DL
WBC # BLD AUTO: 13.1 10E3/UL (ref 4–11)
WBC URINE: 1 /HPF

## 2021-10-07 PROCEDURE — 81001 URINALYSIS AUTO W/SCOPE: CPT | Mod: XU | Performed by: NURSE PRACTITIONER

## 2021-10-07 PROCEDURE — 250N000011 HC RX IP 250 OP 636: Performed by: NURSE PRACTITIONER

## 2021-10-07 PROCEDURE — 36415 COLL VENOUS BLD VENIPUNCTURE: CPT | Performed by: NURSE PRACTITIONER

## 2021-10-07 PROCEDURE — 250N000009 HC RX 250: Performed by: NURSE PRACTITIONER

## 2021-10-07 PROCEDURE — 80053 COMPREHEN METABOLIC PANEL: CPT | Performed by: NURSE PRACTITIONER

## 2021-10-07 PROCEDURE — 96375 TX/PRO/DX INJ NEW DRUG ADDON: CPT

## 2021-10-07 PROCEDURE — 83690 ASSAY OF LIPASE: CPT | Performed by: NURSE PRACTITIONER

## 2021-10-07 PROCEDURE — 36415 COLL VENOUS BLD VENIPUNCTURE: CPT | Performed by: INTERNAL MEDICINE

## 2021-10-07 PROCEDURE — 96361 HYDRATE IV INFUSION ADD-ON: CPT

## 2021-10-07 PROCEDURE — 99285 EMERGENCY DEPT VISIT HI MDM: CPT | Mod: 25

## 2021-10-07 PROCEDURE — 258N000003 HC RX IP 258 OP 636: Performed by: INTERNAL MEDICINE

## 2021-10-07 PROCEDURE — 250N000013 HC RX MED GY IP 250 OP 250 PS 637: Performed by: INTERNAL MEDICINE

## 2021-10-07 PROCEDURE — 83605 ASSAY OF LACTIC ACID: CPT | Performed by: NURSE PRACTITIONER

## 2021-10-07 PROCEDURE — 84300 ASSAY OF URINE SODIUM: CPT | Performed by: INTERNAL MEDICINE

## 2021-10-07 PROCEDURE — 80307 DRUG TEST PRSMV CHEM ANLYZR: CPT | Performed by: NURSE PRACTITIONER

## 2021-10-07 PROCEDURE — 82803 BLOOD GASES ANY COMBINATION: CPT

## 2021-10-07 PROCEDURE — 85027 COMPLETE CBC AUTOMATED: CPT | Performed by: NURSE PRACTITIONER

## 2021-10-07 PROCEDURE — U0003 INFECTIOUS AGENT DETECTION BY NUCLEIC ACID (DNA OR RNA); SEVERE ACUTE RESPIRATORY SYNDROME CORONAVIRUS 2 (SARS-COV-2) (CORONAVIRUS DISEASE [COVID-19]), AMPLIFIED PROBE TECHNIQUE, MAKING USE OF HIGH THROUGHPUT TECHNOLOGIES AS DESCRIBED BY CMS-2020-01-R: HCPCS | Performed by: NURSE PRACTITIONER

## 2021-10-07 PROCEDURE — 99223 1ST HOSP IP/OBS HIGH 75: CPT | Mod: AI | Performed by: INTERNAL MEDICINE

## 2021-10-07 PROCEDURE — C9803 HOPD COVID-19 SPEC COLLECT: HCPCS

## 2021-10-07 PROCEDURE — 76705 ECHO EXAM OF ABDOMEN: CPT

## 2021-10-07 PROCEDURE — 96374 THER/PROPH/DIAG INJ IV PUSH: CPT

## 2021-10-07 PROCEDURE — 82010 KETONE BODYS QUAN: CPT | Performed by: NURSE PRACTITIONER

## 2021-10-07 PROCEDURE — 250N000013 HC RX MED GY IP 250 OP 250 PS 637: Performed by: NURSE PRACTITIONER

## 2021-10-07 PROCEDURE — 258N000003 HC RX IP 258 OP 636: Performed by: NURSE PRACTITIONER

## 2021-10-07 PROCEDURE — 120N000001 HC R&B MED SURG/OB

## 2021-10-07 PROCEDURE — 83605 ASSAY OF LACTIC ACID: CPT | Performed by: INTERNAL MEDICINE

## 2021-10-07 RX ORDER — LATANOPROST 50 UG/ML
1 SOLUTION/ DROPS OPHTHALMIC AT BEDTIME
Status: DISCONTINUED | OUTPATIENT
Start: 2021-10-07 | End: 2021-10-08 | Stop reason: HOSPADM

## 2021-10-07 RX ORDER — HYDROXYZINE HYDROCHLORIDE 25 MG/1
25 TABLET, FILM COATED ORAL EVERY 6 HOURS PRN
Status: DISCONTINUED | OUTPATIENT
Start: 2021-10-07 | End: 2021-10-07

## 2021-10-07 RX ORDER — SUCRALFATE 1 G/1
1 TABLET ORAL 4 TIMES DAILY
Status: DISCONTINUED | OUTPATIENT
Start: 2021-10-07 | End: 2021-10-07

## 2021-10-07 RX ORDER — NORTRIPTYLINE HCL 10 MG
30 CAPSULE ORAL AT BEDTIME
Status: DISCONTINUED | OUTPATIENT
Start: 2021-10-07 | End: 2021-10-08 | Stop reason: HOSPADM

## 2021-10-07 RX ORDER — PROCHLORPERAZINE 25 MG
25 SUPPOSITORY, RECTAL RECTAL EVERY 12 HOURS PRN
Status: DISCONTINUED | OUTPATIENT
Start: 2021-10-07 | End: 2021-10-08 | Stop reason: HOSPADM

## 2021-10-07 RX ORDER — HALOPERIDOL 5 MG/ML
2.5 INJECTION INTRAMUSCULAR ONCE
Status: COMPLETED | OUTPATIENT
Start: 2021-10-07 | End: 2021-10-07

## 2021-10-07 RX ORDER — FAMOTIDINE 20 MG/1
20 TABLET, FILM COATED ORAL 2 TIMES DAILY
Status: DISCONTINUED | OUTPATIENT
Start: 2021-10-08 | End: 2021-10-07

## 2021-10-07 RX ORDER — LIDOCAINE 40 MG/G
CREAM TOPICAL
Status: DISCONTINUED | OUTPATIENT
Start: 2021-10-07 | End: 2021-10-08 | Stop reason: HOSPADM

## 2021-10-07 RX ORDER — FAMOTIDINE 20 MG/1
20 TABLET, FILM COATED ORAL DAILY
Status: DISCONTINUED | OUTPATIENT
Start: 2021-10-08 | End: 2021-10-08 | Stop reason: HOSPADM

## 2021-10-07 RX ORDER — SUCRALFATE ORAL 1 G/10ML
1 SUSPENSION ORAL ONCE
Status: COMPLETED | OUTPATIENT
Start: 2021-10-07 | End: 2021-10-07

## 2021-10-07 RX ORDER — AMLODIPINE BESYLATE 10 MG/1
10 TABLET ORAL DAILY
Status: DISCONTINUED | OUTPATIENT
Start: 2021-10-07 | End: 2021-10-08 | Stop reason: HOSPADM

## 2021-10-07 RX ORDER — VENLAFAXINE HYDROCHLORIDE 37.5 MG/1
37.5 CAPSULE, EXTENDED RELEASE ORAL
Status: DISCONTINUED | OUTPATIENT
Start: 2021-10-08 | End: 2021-10-08 | Stop reason: HOSPADM

## 2021-10-07 RX ORDER — ACETAMINOPHEN 325 MG/1
975 TABLET ORAL EVERY 8 HOURS
Status: DISCONTINUED | OUTPATIENT
Start: 2021-10-07 | End: 2021-10-08 | Stop reason: HOSPADM

## 2021-10-07 RX ORDER — ONDANSETRON 4 MG/1
4 TABLET, ORALLY DISINTEGRATING ORAL EVERY 6 HOURS PRN
Status: DISCONTINUED | OUTPATIENT
Start: 2021-10-07 | End: 2021-10-08 | Stop reason: HOSPADM

## 2021-10-07 RX ORDER — ONDANSETRON 2 MG/ML
4 INJECTION INTRAMUSCULAR; INTRAVENOUS EVERY 6 HOURS PRN
Status: DISCONTINUED | OUTPATIENT
Start: 2021-10-07 | End: 2021-10-08 | Stop reason: HOSPADM

## 2021-10-07 RX ORDER — FAMOTIDINE 20 MG/1
20 TABLET, FILM COATED ORAL 2 TIMES DAILY
Status: DISCONTINUED | OUTPATIENT
Start: 2021-10-07 | End: 2021-10-07

## 2021-10-07 RX ORDER — SODIUM CHLORIDE 9 MG/ML
INJECTION, SOLUTION INTRAVENOUS CONTINUOUS
Status: DISCONTINUED | OUTPATIENT
Start: 2021-10-07 | End: 2021-10-08 | Stop reason: HOSPADM

## 2021-10-07 RX ORDER — PROCHLORPERAZINE MALEATE 5 MG
10 TABLET ORAL EVERY 6 HOURS PRN
Status: DISCONTINUED | OUTPATIENT
Start: 2021-10-07 | End: 2021-10-08 | Stop reason: HOSPADM

## 2021-10-07 RX ADMIN — SODIUM CHLORIDE 1000 ML: 9 INJECTION, SOLUTION INTRAVENOUS at 20:18

## 2021-10-07 RX ADMIN — SODIUM CHLORIDE: 9 INJECTION, SOLUTION INTRAVENOUS at 22:45

## 2021-10-07 RX ADMIN — SODIUM CHLORIDE 1000 ML: 9 INJECTION, SOLUTION INTRAVENOUS at 18:39

## 2021-10-07 RX ADMIN — AMLODIPINE BESYLATE 10 MG: 10 TABLET ORAL at 22:45

## 2021-10-07 RX ADMIN — FAMOTIDINE 20 MG: 10 INJECTION, SOLUTION INTRAVENOUS at 19:31

## 2021-10-07 RX ADMIN — SUCRALFATE 1 G: 1 SUSPENSION ORAL at 18:56

## 2021-10-07 RX ADMIN — ACETAMINOPHEN 975 MG: 325 TABLET, FILM COATED ORAL at 22:45

## 2021-10-07 RX ADMIN — LATANOPROST 1 DROP: 50 SOLUTION OPHTHALMIC at 22:45

## 2021-10-07 RX ADMIN — HALOPERIDOL LACTATE 2.5 MG: 5 INJECTION, SOLUTION INTRAMUSCULAR at 18:52

## 2021-10-07 ASSESSMENT — ENCOUNTER SYMPTOMS
CHILLS: 0
VOMITING: 1
NAUSEA: 1
ABDOMINAL PAIN: 1
FEVER: 0
SHORTNESS OF BREATH: 0
DYSURIA: 0

## 2021-10-07 ASSESSMENT — MIFFLIN-ST. JEOR: SCORE: 1425.28

## 2021-10-07 NOTE — LETTER
Transition Communication Hand-off for Care Transitions to Next Level of Care Provider    Name: Kym PICKETT Early  : 1990  MRN #: [id number]  Primary Care Provider: [facility]     Primary Clinic: [address] [place] [id number]     Reason for Hospitalization:  Nausea with vomiting [R11.2]  Cyclical vomiting [R11.15]  Acute kidney failure (H) [N17.9]  Admit Date/Time: 10/7/2021  6:18 PM  Discharge Date: 10/8/21  Payor Source: Payor: PREFERREDONE / Plan: PREFERREDONE HMO / Product Type: PPO /            Reason for Communication Hand-off Referral: Fragility  Multiple providers/specialties    Discharge Plan: Home       Concern for non-adherence with plan of care:   No  Discharge Needs Assessment:  Needs      Most Recent Value   Equipment Currently Used at Home  none   PAS Number  -- [N/A]          Already enrolled in Tele-monitoring program and name of program:  NA  Follow-up specialty is recommended: No    Follow-up plan:  No future appointments.    Any outstanding tests or procedures:  None            Key Recommendations:    Pt admitted with nausea with vomiting, noted to have unplanned readmission risk.   SW met with patient and aware that the pt is currently using marijuana which may be exacerbating her symptoms and medical diagnoses, potentially causing frequent hospitalizations (per physician notes).  The pt's bedside nurse discussed with sw the potential for the pt to do outpatient fluid infusions to help decrease the number of hospitalizations.  Due to the pt's nausea and vomiting, she becomes dehydrated and makes her symptoms worse.  Seema discussed with the pt that the physician wants her to follow-up with her PCP on Monday 10/11 for a follow-up lab.  The pt said that she sees Dr. Mccormick at the [facility].  She said that she will call for the lab appointment on Monday and go to a closer clinic.  Seema suggested that she speak with her PCP  "about doing an outpatient fluid infusion because they will be the one who arranges that.  The pt said that she will talk with Dr. Mccormick regarding the fluid infusion.  Sw discussed her marijuana use and she said that she began using it to \"curb the pain\" that she has.  She said that she has had the pain since 2018.  She smokes marijuana and also cooks with it.  Sw encouraged her to stop using and offered resources, explaining that it will likely increase her hospitalizations if she continues to use marijuana.  The pt confirmed that she is employed full-time.  Her boyfriend and brother are very supportive.  She said that when she is not feeling well, they will come and stay with her.  The pt declined CD resources and states she knows she needs to quit using the marijuana.     Please follow up post hospitalization and assist with coordinating outpatient infusion as needed and ordered by PCP.    Bernice Johns RN Case Manager  Inpatient Care Coordination  [facility]   [phone]    AVS/Discharge Summary is the source of truth; this is a helpful guide for improved communication of patient story          "

## 2021-10-07 NOTE — ED PROVIDER NOTES
History   Chief Complaint:  Abdominal Pain       The history is provided by the patient.      Kym Lemus is a 31 year old female with history of hypertension, diabetes mellitus type two, and gastroparesis who presents with abdominal pain. Patient complains of abdominal pain that started three days ago. She has been vomiting and is not able to keep anything down. She is supposed to see a GI doctor next month. No history of abdominal surgeries. No new medications. She stopped smoking marijuana in March.     Review of Systems   Constitutional: Negative for chills and fever.   Respiratory: Negative for shortness of breath.    Cardiovascular: Negative for chest pain.   Gastrointestinal: Positive for abdominal pain, nausea and vomiting.   Genitourinary: Negative for dysuria.   All other systems reviewed and are negative.    Allergies:  Metformin    Medications:  Norvasc  Pepcid  Atarax  Pamelor  Compazine  Carafate  Effexor-er    Past Medical History:     Benign essential hypertension  Cannabinoid hyperemesis syndrome  Cervical intraepithelial glandular neoplasia  Diabetes mellitus type two  Gastritis  Gastroparesis  Hyponatremia  Acute kidney injury  Hypokalemia  Adjustment disorder  Acute on chronic renal insufficiency  Irregular menses  Marijuana use    Past Surgical History:    Leep tx, cervical      Family History:    Mother: hypertension, diabetes type two, cataracts, glaucoma     Social History:  Presents to ED alone    Physical Exam     Patient Vitals for the past 24 hrs:   BP Temp Temp src Pulse Resp SpO2   10/07/21 1915 (!) 161/99 -- -- 108 20 100 %   10/07/21 1900 (!) 192/112 -- -- 120 19 100 %   10/07/21 1845 (!) 171/137 -- -- (!) 141 24 99 %   10/07/21 1819 (!) 142/111 -- -- -- -- --   10/07/21 1816 -- 98  F (36.7  C) Oral 73 24 94 %       Physical Exam  General: Alert, Severe discomfort, well kept, not sitting still in bed  Eyes: PERRL, conjunctivae pink no scleral icterus or conjunctival  injection  ENT:   Moist mucus membranes, posterior oropharynx clear without erythema or exudates, No lymphadenopathy, Normal voice  Resp:  Lungs clear to auscultation bilaterally, no crackles/rubs/wheezes. Good air movement  CV:  Normal rate and rhythm, no murmurs/rubs/gallops  GI:  Abdomen soft and non-distended.  Normoactive BS.  Epigastric tenderness, No guarding or rebound, No masses  Skin:  Warm, dry.  No rashes or petechiae  Musculoskeletal: No peripheral edema or calf tenderness, Normal gross ROM   Neuro: Alert and oriented to person/place/time, normal sensation  Psychiatric: Anxious, cooperative, good eye contact    Emergency Department Course     Imaging:  US Abdomen Limited       1.  Normal limited abdominal ultrasound.   As per radiology.     Laboratory:  iStat Gases (lactate) venous, POCT: LACTW 2.8(H), O2 Sat 65(L), pCO2V 33(L), pH 7.52(H), o/w WNL    Ketone Beta-Hydroxybutyrate Quantitative: 0.4    Lactic acid (result time 1852): 2.6(H)    CMP: Sodium 132(L), Chloride 92(L), Urea Nitrogen 43(H), Creatinine 2.26(H), Glucose 163(H), Protein Total 9.4(H), GFR 28(L), o/w WNL     Lipase: 132    CBC: WBC 13.1(H), HGB 12.5, (H)     UA with microscopic: Protein Albumin 30(A), Squamous Epithelials 3(H), o/w WNL     Asymptomatic COVID-19 Virus (Coronavirus) by PCR Nasopharyngeal: Pending    Drug abuse screen 1 urine: Cannabinoids: Positive(A), o/w all negative    Emergency Department Course:  Reviewed:  I reviewed nursing notes, vitals, past medical history and Care Everywhere    Assessments:  1837 I obtained history and examined the patient as noted above.   1930 I rechecked the patient and explained findings.     Consults:  1937 I spoke with Dr. Mario from the Hospitalist service regarding patient's presentation, findings, and plan of care.     Interventions:  1839 NS, 1L, IV  1852 Haldol, 2.5 mg, IV  1856 Carafate, 1 g, PO  1931 Pepcid, 20 mg, IV  2018 NS, 1L, IV    Disposition:  The patient was  admitted to the hospital under the care of Dr. Mario.     Impression & Plan     Medical Decision Making:  Kym Lemus is a 31 year old female who presents today for evaluation of nausea and vomiting.  Patient has an ongoing history of cyclic vomiting syndrome.  Has had marijuana abuse in the past.  She states she has not used marijuana for some time.  Her evaluation is consistent with either cyclic vomiting consistent with cannabis abuse or gastroparesis.  She does have a history of both.  Her evaluation shows dehydration with a elevated lactic acid and acute kidney injury with a creatinine of 2.26.  This is up approximately 1.5 from less than a month ago.  Mildly elevated white blood cell count.  Ultrasound was obtained which shows no acute findings.  No indication for further imaging.  Plan will be to admit patient to the hospital service for continued monitoring evaluation and rehydration.  I discussed the case with the hospitalist who does accept patient to his service.      Covid-19  Kym Lemus was evaluated during a global COVID-19 pandemic, which necessitated consideration that the patient might be at risk for infection with the SARS-CoV-2 virus that causes COVID-19.   Applicable protocols for evaluation were followed during the patient's care.   COVID-19 was considered as part of the patient's evaluation. The plan for testing is:  a test was obtained during this visit.    Diagnosis:    ICD-10-CM    1. Nausea with vomiting  R11.2    2. Acute kidney failure (H)  N17.9    3. Cyclical vomiting  R11.15        Scribe Disclosure:  Jack OGDEN, am serving as a scribe at 6:21 PM on 10/7/2021 to document services personally performed by Jeovanny Diggs APRN CNP based on my observations and the provider's statements to me.            Jeovanny Diggs APRN CNP  10/07/21 0760

## 2021-10-07 NOTE — ED TRIAGE NOTES
A&O x4.  ABC's intact.      Pt arrives with c/o abdominal and back pain for a long time. Has been vomiting.

## 2021-10-08 VITALS
BODY MASS INDEX: 26.01 KG/M2 | DIASTOLIC BLOOD PRESSURE: 86 MMHG | WEIGHT: 156.1 LBS | TEMPERATURE: 98.4 F | RESPIRATION RATE: 20 BRPM | OXYGEN SATURATION: 99 % | HEART RATE: 106 BPM | HEIGHT: 65 IN | SYSTOLIC BLOOD PRESSURE: 131 MMHG

## 2021-10-08 LAB
ANION GAP SERPL CALCULATED.3IONS-SCNC: 6 MMOL/L (ref 3–14)
BUN SERPL-MCNC: 29 MG/DL (ref 7–30)
CALCIUM SERPL-MCNC: 8.4 MG/DL (ref 8.5–10.1)
CHLORIDE BLD-SCNC: 102 MMOL/L (ref 94–109)
CO2 SERPL-SCNC: 24 MMOL/L (ref 20–32)
CREAT SERPL-MCNC: 1.73 MG/DL (ref 0.52–1.04)
ERYTHROCYTE [DISTWIDTH] IN BLOOD BY AUTOMATED COUNT: 12.3 % (ref 10–15)
GFR SERPL CREATININE-BSD FRML MDRD: 38 ML/MIN/1.73M2
GLUCOSE BLD-MCNC: 151 MG/DL (ref 70–99)
HCT VFR BLD AUTO: 33 % (ref 35–47)
HGB BLD-MCNC: 10.9 G/DL (ref 11.7–15.7)
LACTATE SERPL-SCNC: 0.9 MMOL/L (ref 0.7–2)
MCH RBC QN AUTO: 28.5 PG (ref 26.5–33)
MCHC RBC AUTO-ENTMCNC: 33 G/DL (ref 31.5–36.5)
MCV RBC AUTO: 86 FL (ref 78–100)
PLATELET # BLD AUTO: 357 10E3/UL (ref 150–450)
POTASSIUM BLD-SCNC: 3.2 MMOL/L (ref 3.4–5.3)
POTASSIUM BLD-SCNC: 3.5 MMOL/L (ref 3.4–5.3)
RBC # BLD AUTO: 3.83 10E6/UL (ref 3.8–5.2)
SARS-COV-2 RNA RESP QL NAA+PROBE: NEGATIVE
SODIUM SERPL-SCNC: 132 MMOL/L (ref 133–144)
WBC # BLD AUTO: 9.8 10E3/UL (ref 4–11)

## 2021-10-08 PROCEDURE — 250N000013 HC RX MED GY IP 250 OP 250 PS 637: Performed by: INTERNAL MEDICINE

## 2021-10-08 PROCEDURE — 258N000003 HC RX IP 258 OP 636: Performed by: INTERNAL MEDICINE

## 2021-10-08 PROCEDURE — 85027 COMPLETE CBC AUTOMATED: CPT | Performed by: INTERNAL MEDICINE

## 2021-10-08 PROCEDURE — 36415 COLL VENOUS BLD VENIPUNCTURE: CPT | Performed by: INTERNAL MEDICINE

## 2021-10-08 PROCEDURE — 83605 ASSAY OF LACTIC ACID: CPT | Performed by: INTERNAL MEDICINE

## 2021-10-08 PROCEDURE — 96361 HYDRATE IV INFUSION ADD-ON: CPT

## 2021-10-08 PROCEDURE — G0378 HOSPITAL OBSERVATION PER HR: HCPCS

## 2021-10-08 PROCEDURE — 84132 ASSAY OF SERUM POTASSIUM: CPT | Mod: 91 | Performed by: INTERNAL MEDICINE

## 2021-10-08 PROCEDURE — 99239 HOSP IP/OBS DSCHRG MGMT >30: CPT | Performed by: INTERNAL MEDICINE

## 2021-10-08 PROCEDURE — 80048 BASIC METABOLIC PNL TOTAL CA: CPT | Performed by: INTERNAL MEDICINE

## 2021-10-08 RX ORDER — ONDANSETRON 4 MG/1
4 TABLET, ORALLY DISINTEGRATING ORAL EVERY 6 HOURS PRN
Qty: 20 TABLET | Refills: 0 | Status: SHIPPED | OUTPATIENT
Start: 2021-10-08 | End: 2021-10-28

## 2021-10-08 RX ORDER — POTASSIUM CHLORIDE 1500 MG/1
40 TABLET, EXTENDED RELEASE ORAL ONCE
Status: COMPLETED | OUTPATIENT
Start: 2021-10-08 | End: 2021-10-08

## 2021-10-08 RX ADMIN — POTASSIUM CHLORIDE 40 MEQ: 1500 TABLET, EXTENDED RELEASE ORAL at 09:31

## 2021-10-08 RX ADMIN — NORTRIPTYLINE HYDROCHLORIDE 30 MG: 10 CAPSULE ORAL at 00:30

## 2021-10-08 RX ADMIN — FAMOTIDINE 20 MG: 20 TABLET ORAL at 08:48

## 2021-10-08 RX ADMIN — SODIUM CHLORIDE: 9 INJECTION, SOLUTION INTRAVENOUS at 08:41

## 2021-10-08 RX ADMIN — VENLAFAXINE HYDROCHLORIDE 37.5 MG: 37.5 CAPSULE, EXTENDED RELEASE ORAL at 08:49

## 2021-10-08 RX ADMIN — AMLODIPINE BESYLATE 10 MG: 10 TABLET ORAL at 08:48

## 2021-10-08 ASSESSMENT — ACTIVITIES OF DAILY LIVING (ADL)
ADLS_ACUITY_SCORE: 6
ADLS_ACUITY_SCORE: 6
ADLS_ACUITY_SCORE: 15

## 2021-10-08 ASSESSMENT — MIFFLIN-ST. JEOR: SCORE: 1413.94

## 2021-10-08 NOTE — PHARMACY-ADMISSION MEDICATION HISTORY
Admission medication history interview status for this patient is complete. See [facility] admission navigator for allergy information, prior to admission medications and immunization status.     Medication history interview done, indicate source(s): Patient  Medication history resources (including written lists, pill bottles, clinic record):None      Changes made to PTA medication list:  Added: none  Changed: none  Reported as Not Taking: none  Removed: pepcid, atarax, xalatan Opth, compazine, carafate    Actions taken by pharmacist (provider contacted, etc):None     Additional medication history information:None    Medication reconciliation/reorder completed by provider prior to medication history?  y   (Y/N)     For patients on insulin therapy:   Do you use sliding scale insulin based on blood sugars?   What is your pre-meal insulin coverage?    Do you typically eat three meals a day?   How many times do you check your blood glucose per day?   How many episodes of hypoglycemia do you typically have per month?   Do you have a Continuous Glucose Monitor (CGM)?      Prior to Admission medications    Medication Sig Last Dose Taking? Auth Provider   amLODIPine (NORVASC) 10 MG tablet Take 1 tablet (10 mg) by mouth daily 10/6/2021 at Unknown time Yes Ty Arce MD   nortriptyline (PAMELOR) 10 MG capsule Take 30 mg by mouth At Bedtime  10/6/2021 at Unknown time Yes Reported, Patient   venlafaxine (EFFEXOR-ER) 37.5 MG 24 hr tablet Take 1 tablet (37.5 mg) by mouth daily (with breakfast) 10/6/2021 at Unknown time Yes Jerald Giordano APRN CNP

## 2021-10-08 NOTE — PROGRESS NOTES
Transition Communication Hand-off for Care Transitions to Next Level of Care Provider    Name: Kym PICKETT Early  : 1990  MRN #: [id number]  Primary Care Provider: [facility]     Primary Clinic: [address] [place] [address]     Reason for Hospitalization:  Nausea with vomiting [R11.2]  Cyclical vomiting [R11.15]  Acute kidney failure (H) [N17.9]  Admit Date/Time: 10/7/2021  6:18 PM  Discharge Date: 10/8/21  Payor Source: Payor: PREFERREDONE / Plan: PREFERREDONE HMO / Product Type: PPO /            Reason for Communication Hand-off Referral: Fragility  Multiple providers/specialties    Discharge Plan: Home       Concern for non-adherence with plan of care:   No  Discharge Needs Assessment:  Needs      Most Recent Value   Equipment Currently Used at Home  none   PAS Number  -- [N/A]          Already enrolled in Tele-monitoring program and name of program:  NA  Follow-up specialty is recommended: No    Follow-up plan:  No future appointments.    Any outstanding tests or procedures:  None            Key Recommendations:    Pt admitted with nausea with vomiting, noted to have unplanned readmission risk.   SW met with patient and aware that the pt is currently using marijuana which may be exacerbating her symptoms and medical diagnoses, potentially causing frequent hospitalizations (per physician notes).  The pt's bedside nurse discussed with sw the potential for the pt to do outpatient fluid infusions to help decrease the number of hospitalizations.  Due to the pt's nausea and vomiting, she becomes dehydrated and makes her symptoms worse.  Seema discussed with the pt that the physician wants her to follow-up with her PCP on Monday 10/11 for a follow-up lab.  The pt said that she sees Dr. Mccormick at the [facility].  She said that she will call for the lab appointment on Monday and go to a closer clinic.  Seema suggested that she speak with her PCP  "about doing an outpatient fluid infusion because they will be the one who arranges that.  The pt said that she will talk with Dr. Mccormick regarding the fluid infusion.  Sw discussed her marijuana use and she said that she began using it to \"curb the pain\" that she has.  She said that she has had the pain since 2018.  She smokes marijuana and also cooks with it.  Sw encouraged her to stop using and offered resources, explaining that it will likely increase her hospitalizations if she continues to use marijuana.  The pt confirmed that she is employed full-time.  Her boyfriend and brother are very supportive.  She said that when she is not feeling well, they will come and stay with her.  The pt declined CD resources and states she knows she needs to quit using the marijuana.     Please follow up post hospitalization and assist with coordinating outpatient infusion as needed and ordered by PCP.    Bernice Johns RN Case Manager  Inpatient Care Coordination  [facility]   [phone]    AVS/Discharge Summary is the source of truth; this is a helpful guide for improved communication of patient story  "

## 2021-10-08 NOTE — H&P
Admitted: 10/07/2021    CHIEF COMPLAINT:  Abdominal pain, nausea and vomiting.    HISTORY OF PRESENT ILLNESS:  Kym Lemus is a 31-year-old female with past medical history significant for cyclical vomiting syndrome, previous history of marijuana use, stated her last use was in 03/2021, chronic abdominal pain, chronic kidney disease, hypertension, who presents to the Emergency Room today with a chief complaint of abdominal pain, nausea and vomiting of 4 days' duration with significant decreased oral intake and being admitted to the hospital for symptom control.  The patient states that she has been vomiting and not able to keep anything down for the last 4 days.  She is supposed to see a GI specialist next month.  She has no previous history of abdominal surgeries.  No new medication.  She states that she stopped smoking marijuana in 03/2021, but her urine was positive in 05/2021.  It is noted the last 2 urine toxicology were negative for marijuana.  In the Emergency Room, she was evaluated, discussed with Jeovanny Diggs from ED, who gave her bolus of IV fluid, nausea medications and IV Pepcid, and the patient felt better.    PAST MEDICAL HISTORY:    1.  Cannabinoid hyperemesis syndrome.  2.  Diabetes mellitus type 2, diet controlled.  3.  Gastritis.  4.  Gastroparesis.  5.  Hyponatremia.  6.  Acute kidney injury.  7.  Chronic kidney disease.  8.  Adjustment disorder.  9.  Intractable nausea and vomiting.    PAST SURGICAL HISTORY:  Cervical LEEP treatment.    SOCIAL HISTORY:  The patient lives in [place] alone.  She stopped using marijuana.  She does not smoke.  She does not drink alcohol.    FAMILY HISTORY:  Reviewed.  Mother with hypertension, diabetes mellitus.  Otherwise, none significant.    HOME MEDICATIONS:  Reviewed.    ALLERGIES:  METFORMIN.    PHYSICAL EXAMINATION:    GENERAL:  The patient is awake, alert, looks comfortable, not in distress.  VITAL SIGNS:  Blood pressure 160/99, pulse rate 115, temperature  98, oxygen saturation 100% on room air.  HEENT:  Pink and anicteric.  Extraocular muscle movement intact.  NECK:  Supple.  No JVD, no thyromegaly.  CHEST:  Good air entry bilaterally.  No wheezing, crackles or rales.  CARDIOVASCULAR SYSTEM:  S1 and S2 well heard, tachycardic, regular.  ABDOMEN:  Soft.  Mild tenderness epigastric to the left side.  Positive bowel sounds.  EXTREMITIES:  No edema, cyanosis or clubbing.  Peripheral pulses are palpable.  NEUROLOGIC:  Cranial nerves II-XII grossly intact.  Bilateral lower extremity strength is intact.  PSYCHIATRIC:  Normal mood and affect, cooperative, slow to respond to questions.  Keeps eye contact.    DIAGNOSTIC TESTS OF INTEREST:  Sodium 132, potassium 3.6, BUN 43, creatinine 2.26.  Her latest creatinine on 09/26/2021 was 1.57.  Troponin negative.  Lactate 2.8.  WBC 13.1, hemoglobin 12.5, platelets 463.  Urinalysis is negative.  Urine toxicology is positive for marijuana again.  Ultrasound of the abdomen normal; limited abdominal ultrasound.    ASSESSMENT AND PLAN:  Kym Lemus is a 31-year-old female with history of hypertension, diabetes mellitus type 2 diet-controlled, gastroparesis, cannabinoid hyperemesis syndrome, who came in today with 4 days of nausea, vomiting, abdominal pain and significantly decreased oral intake and being admitted to the hospital for further evaluation and management.  1.  Acute renal failure on chronic kidney disease:  Her baseline creatinine is 1.5, but this time her creatinine increased to 2.26.  BUN is also elevated to 43.  This is likely due to dehydration in the setting of decreased oral intake, nausea and vomiting.  The patient will be on IV fluids.  Monitor input and output.  Check blood work in the morning.  2.  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  It seems that she is not forthcoming  with her history.  Probably this is related to her marijuana use and underlying gastroparesis as well, and will follow up with urologist as an outpatient.  Need to consult with her physician over drug use.  She will be on nausea and vomiting medication, on Pepcid as well.  She will be on regular diet as tolerated.  3.  Hyponatremia:  This is also secondary to dehydration.  I expect to improve with IV fluids.  Potassium is borderline low.  We will keep an eye on that and check in the morning the blood work.  4.  Hypertension:  This is due to underlying stress level as well as nausea and vomiting.  The patient is on Norvasc at baseline, which will be continued.  We will add hydralazine with parameters.  5.  Chronic abdominal pain:  This has been triggered in the past.   6.  She also has family history of migraine.  A neurologist who saw her in the past and started on nortriptyline, which will be continued.  7.  Lactic acidemia:  This is secondary to dehydration, nausea and vomiting.    8.  She has also leukocytosis, which is reactive.  There is no sign of infection.  Urinalysis is negative, and no need for further evaluation as to possibility of infection at this point.     I discussed with the patient the plan of care.  All her questions and concerns addressed, showed understanding.    CODE STATUS:  The patient is full code.    Sergio Mario MD        D: 10/07/2021   T: 10/07/2021   MT: ANNIE    Name:     MILTON MOLINA  MRN:      [id number]        Account:     [id number]   :      1990           Admitted:    10/07/2021       Document: [id number]

## 2021-10-08 NOTE — DISCHARGE SUMMARY
[facility]  Discharge Summary  Hospitalist      Date of Admission:  10/7/2021  Date of Discharge:  10/8/2021  Provider:  Jaswinder Gill MD. [facility]  Date of Service (when I last saw the patient): 10/08/21      [facility]          Discharge Diagnosis:     Discharge Diagnoses   Hyperemesis syndrome intractable nausea and vomiting  Acute kidney injury  Chronic abdominal pain      Other medical issues:  Past Medical History:   Diagnosis Date     Benign essential hypertension      Cannabinoid hyperemesis syndrome      Cervical intraepithelial glandular neoplasia     s/p LEEP     Diabetes mellitus type 2, diet-controlled (H)      Gastritis      Gastroparesis     and canabinoid hyperemesis cb DEREK         Please see the admission history and physical for full details.     Hospital Course     Kym Lemus was admitted on 10/7/2021.  The following problems were addressed during her hospitalization:  31-year-old female with history of hypertension, diabetes mellitus type 2 diet-controlled, gastroparesis, cannabinoid hyperemesis syndrome, who came in today with 4 days of nausea, vomiting, abdominal pain and significantly decreased oral intake and being admitted to the hospital for further evaluation and management.  Patient had acute kidney injury on admission with a creatinine of 2.26 which is above her baseline she has been 1.5-1.8 recently, after IV hydration her kidney this morning decreased to 1.7, she did have hypokalemia and hyponatremia, her hyponatremia improved with IV fluid and potassium was replaced.  Her nausea significantly improved she was able to tolerate breakfast and denied any nausea since this morning, ready for discharge.  She initially denied weed use however seems to admit to edible cannabinoids, her urine is also positive.  I told her that she has history of hyperemesis with marijuana and she may stop using it otherwise she will end  up in the hospital more frequently.  She seems to understand the consequence of ongoing marijuana use.  I highly suspect this was the contributing factor to her symptoms specially that she quickly improved after admission, obviously she has diabetes diet-controlled and gastroparesis which can be the cause also.    Pending Results   Unresulted Labs Ordered in the Past 30 Days of this Admission     No orders found for last 31 day(s).          Discharge Orders      Reason for your hospital stay    Intractable nausea and vomiting     Follow-up and recommended labs and tests     Follow-up with primary care physician on Monday with repeat BMP     Activity    Your activity upon discharge: activity as tolerated     Diet    Follow this diet upon discharge: Orders Placed This Encounter      Combination Diet diabetic diet       Code Status   Full Code       Primary Care Physician   [facility]    Physical Exam   Temp: 98.4  F (36.9  C) Temp src: Oral BP: 131/86 Pulse: 106   Resp: 20 SpO2: 99 % O2 Device: None (Room air)    Vitals:    10/07/21 2132 10/08/21 0626   Weight: 71.9 kg (158 lb 9.6 oz) 70.8 kg (156 lb 1.6 oz)     Vital Signs with Ranges  Temp:  [96.7  F (35.9  C)-98.6  F (37  C)] 98.4  F (36.9  C)  Pulse:  [] 106  Resp:  [9-24] 20  BP: (114-192)/() 131/86  SpO2:  [94 %-100 %] 99 %  I/O last 3 completed shifts:  In: 1000 [IV Piggyback:1000]  Out: -     Constitutional:  alert, cooperative, no apparent distress  Respiratory: No increased work of breathing, good air exchange, no crackles or wheezing.  Cardiovascular: apical impulse,normal S1 and S2  GI: bowel sounds present, soft, non-distended, non-tender      Discharge Disposition   Discharged to home    Consultations This Hospital Stay   None    Time Spent on this Encounter   I, Jaswinder Gill MD, personally saw the patient today and spent greater than 30 minutes discharging this patient.      Discharge Medications   Current  Discharge Medication List      START taking these medications    Details   ondansetron (ZOFRAN-ODT) 4 MG ODT tab Take 1 tablet (4 mg) by mouth every 6 hours as needed for nausea  Qty: 20 tablet, Refills: 0    Associated Diagnoses: Intractable nausea and vomiting         CONTINUE these medications which have NOT CHANGED    Details   amLODIPine (NORVASC) 10 MG tablet Take 1 tablet (10 mg) by mouth daily  Qty: 30 tablet, Refills: 0    Associated Diagnoses: Essential hypertension      nortriptyline (PAMELOR) 10 MG capsule Take 30 mg by mouth At Bedtime       venlafaxine (EFFEXOR-ER) 37.5 MG 24 hr tablet Take 1 tablet (37.5 mg) by mouth daily (with breakfast)  Qty: 30 tablet, Refills: 0    Associated Diagnoses: Adjustment disorder with mixed disturbance of emotions and conduct         STOP taking these medications       latanoprost (XALATAN) 0.005 % ophthalmic solution Comments:   Reason for Stopping:             Allergies   Allergies   Allergen Reactions     Metformin Diarrhea     Data   Most Recent 3 CBC's:Recent Labs   Lab Test 10/08/21  0637 10/07/21  1838 09/26/21  1550   WBC 9.8 13.1* 8.6   HGB 10.9* 12.5 11.6*   MCV 86 84 86    463* 433      Most Recent 3 BMP's:  Recent Labs   Lab Test 10/08/21  0917 10/08/21  0637 10/07/21  1838 09/26/21  1550 09/26/21  1550   NA  --  132* 132*  --  139   POTASSIUM 3.5 3.2* 3.6   < > 3.7   CHLORIDE  --  102 92*  --  107   CO2  --  24 28  --  21   BUN  --  29 43*  --  25   CR  --  1.73* 2.26*  --  1.57*   ANIONGAP  --  6 12  --  11   DIMITRI  --  8.4* 9.7  --  9.6   GLC  --  151* 163*  --  165*    < > = values in this interval not displayed.     Most Recent 2 LFT's:  Recent Labs   Lab Test 10/07/21  1838 09/26/21  1550   AST 23 19   ALT 22 16   ALKPHOS 113 107   BILITOTAL 0.9 0.3     Most Recent INR's and Anticoagulation Dosing History:  Anticoagulation Dose History    There is no flowsheet data to display.       Most Recent 3 Troponin's:  Recent Labs   Lab Test 05/13/21  1458    TROPI <0.015     Most Recent Cholesterol Panel:No lab results found.  Most Recent 6 Bacteria Isolates From Any Culture (See EPIC Reports for Culture Details):No lab results found.  Most Recent TSH, T4 and A1c Labs:  Recent Labs   Lab Test 08/24/21  0453 05/13/21  1451   TSH  --  0.93   A1C 6.4* 6.2*     Results for orders placed or performed during the hospital encounter of 10/07/21   US Abdomen Limited    Narrative    EXAM: US ABDOMEN LIMITED  LOCATION: [facility]  DATE/TIME: 10/7/2021 7:50 PM    INDICATION: pain  COMPARISON: 05/13/2021  TECHNIQUE: Limited abdominal ultrasound.    FINDINGS:    GALLBLADDER: Normal. No gallstones, wall thickening, or pericholecystic fluid. Negative sonographic Roque's sign.    BILE DUCTS: No biliary dilatation. The common duct measures 4 mm.    LIVER: Normal parenchyma with smooth contour. No focal mass.    RIGHT KIDNEY: No hydronephrosis.    PANCREAS: The visualized portions are normal.    Normal caliber of the upper abdominal aorta. Patent intrahepatic IVC.       Impression    IMPRESSION:  1.  Normal limited abdominal ultrasound.                 Disclaimer: This note consists of symbols derived from keyboarding, dictation and/or voice recognition software. As a result, there may be errors in the script that have gone undetected. Please consider this when interpreting information found in this chart.

## 2021-10-08 NOTE — PLAN OF CARE
VSS except tachy. A&O but drowsy. LS clear. SBA with ambulation. Pt c/o of back pain and stated it has been there for years, denied medication. NS 100ml/hr. Pt denied nausea or vomiting. Sepsis protocol triggered. Lactic ordered 0.9 result.

## 2021-10-08 NOTE — PLAN OF CARE
Arrived to unit at 2130. A&O but drowsy. 7/10 abd pain, scheduled tylenol given. VSS ex tachy HR and htn, scheduled norvasc given. NS running at 100ml/hr through PIV. SBA. Renal diet. Collected urine sodium, sent to lab. Cell phone set home with boyfriend, pt is ok with this. Will continue POC    Heladio Rosas RN on 10/7/2021 at 11:23 PM

## 2021-10-08 NOTE — CONSULTS
"Care Management Discharge Note    Discharge Date: 10/08/2021  Expected Time of Departure: 1200    Discharge Disposition: Home - lives alone    Discharge Services: None    Discharge DME: None    Discharge Transportation: family or friend will provide    Private pay costs discussed: Not applicable    PAS Confirmation Code:  (N/A)  Patient/family educated on Medicare website which has current facility and service quality ratings:  (N/A)    Education Provided on the Discharge Plan:  yes  Persons Notified of Discharge Plans: Pt, pt's bedside nurse  Patient/Family in Agreement with the Plan: yes    Handoff Referral Completed: Yes    Additional Information:  Sw met with the pt to discuss discharge resources and follow-up to confirm that she has a PCP.  Sw is also aware that the pt is currently using marijuana which may be exacerbating her symptoms and medical diagnoses, potentially causing frequent hospitalizations (per physician notes).  The pt's bedside nurse discussed with sw the potential for the pt to do outpatient fluid infusions to help decrease the number of hospitalizations.  Due to the pt's nausea and vomiting, she becomes dehydrated and makes her symptoms worse.  Sw discussed with the pt that the physician wants her to follow-up with her PCP on Monday for a follow-up lab.  The pt said that she sees Dr. Mccormick at the [facility].  She said that she will call for the lab appointment on Monday and go to a closer clinic.  Sw suggested that she speak with her PCP about doing an outpatient fluid infusion because they will be the one who arranges that.  The pt said that she will talk with Dr. Mccormick regarding the fluid infusion.  Sw discussed her marijuana use and she said that she began using it to \"curb the pain\" that she has.  She said that she has had the pain since 2018.  She smokes marijuana and also cooks with it.  Seema encouraged her to stop using and offered resources, explaining that " it will likely increase her hospitalizations if she continues to use marijuana.  The pt confirmed that she is employed full-time.  Her boyfriend and brother are very supportive.  She said that when she is not feeling well, they will come and stay with her.  The pt declined CD resources and states she knows she needs to quit using the marijuana.    Pt admitted with nausea with vomiting, noted to have unplanned readmission risk of 36%.      Handoff was given to PCP clinic at discharge by the RN CC.    The pt declined any sw needs at this time.    Sw will continue to be available as needed until discharge.      DEJUAN Dave, [facility]  Inpatient Care Coordination  [facility]  [phone]

## 2021-10-08 NOTE — ED NOTES
[facility]  ED Nurse Handoff Report    Kym Lemus is a 31 year old female   ED Chief complaint: Abdominal Pain  . ED Diagnosis:   Final diagnoses:   Nausea with vomiting   Acute kidney failure (H)   Cyclical vomiting     Allergies:   Allergies   Allergen Reactions     Metformin Diarrhea       Code Status: Full Code  Activity level - Baseline/Home:  Independent. Activity Level - Current:   Independent. Lift room needed: No. Bariatric: No   Needed: No   Isolation: No. Infection: Not Applicable.     Vital Signs:   Vitals:    10/07/21 1819 10/07/21 1845 10/07/21 1900 10/07/21 1915   BP: (!) 142/111 (!) 171/137 (!) 192/112 (!) 161/99   Pulse:  (!) 141 120 108   Resp:  24 19 20   Temp:       TempSrc:       SpO2:  99% 100% 100%       Cardiac Rhythm:  ,      Pain level:    Patient confused: No. Patient Falls Risk: No.   Elimination Status: Unable to void   Patient Report - Initial Complaint: Abdominal Pain. Focused Assessment: Kym Lemus is a 31 year old female with history of hypertension, diabetes mellitus type two, and gastroparesis who presents with abdominal pain. Patient complains of abdominal pain that started three days ago. She has been vomiting and is not able to keep anything down. She is supposed to see a GI doctor next month. No history of abdominal surgeries. No new medications. She stopped smoking marijuana in March.    Tests Performed: CBC, Lactic . Abnormal Results:   Labs Ordered and Resulted from Time of ED Arrival Up to the Time of Departure from the ED   COMPREHENSIVE METABOLIC PANEL - Abnormal; Notable for the following components:       Result Value    Sodium 132 (*)     Chloride 92 (*)     Urea Nitrogen 43 (*)     Creatinine 2.26 (*)     Glucose 163 (*)     Protein Total 9.4 (*)     GFR Estimate 28 (*)     All other components within normal limits   CBC WITH PLATELETS - Abnormal; Notable for the following components:    WBC Count 13.1 (*)     Platelet Count 463  (*)     All other components within normal limits   LACTIC ACID WHOLE BLOOD - Abnormal; Notable for the following components:    Lactic Acid 2.6 (*)     All other components within normal limits   ISTAT GASES LACTATE VENOUS POCT - Abnormal; Notable for the following components:    Lactic Acid POCT 2.8 (*)     O2 Sat, Venous POCT 65 (*)     pCO2V Venous POCT 33 (*)     pH Venous POCT 7.52 (*)     All other components within normal limits   LIPASE - Normal   KETONE BETA-HYDROXYBUTYRATE QUANTITATIVE, RAPID - Normal   ISTAT GASES LACTATE VENOUS POCT   ROUTINE UA WITH MICROSCOPIC REFLEX TO CULTURE   COVID-19 VIRUS (CORONAVIRUS) BY PCR   MAY SALINE LOCK IV   URINE DRUGS OF ABUSE SCREEN     US Abdomen Limited    (Results Pending)   .   Treatments provided: NA  Family Comments: NA  OBS brochure/video discussed/provided to patient:  No  ED Medications:   Medications   0.9% sodium chloride BOLUS (has no administration in time range)   0.9% sodium chloride BOLUS (1,000 mLs Intravenous New Bag 10/7/21 1839)   haloperidol lactate (HALDOL) injection 2.5 mg (2.5 mg Intravenous Given 10/7/21 1852)   famotidine (PEPCID) injection 20 mg (20 mg Intravenous Given 10/7/21 1931)   sucralfate (CARAFATE) suspension 1 g (1 g Oral Given 10/7/21 1856)     Drips infusing:  Yes  For the majority of the shift, the patient's behavior Green. Interventions performed were NA.    Sepsis treatment initiated: No     Patient tested for COVID 19 prior to admission: YES    ED Nurse Name/Phone Number: Salud Puckett RN,   7:41 PM    RECEIVING UNIT ED HANDOFF REVIEW    Above ED Nurse Handoff Report was reviewed: Yes  Reviewed by: Heladio Rosas RN on October 7, 2021 at 9:12 PM

## 2021-10-08 NOTE — PLAN OF CARE
"/86 (BP Location: Right arm)   Pulse 106   Temp 98.4  F (36.9  C) (Oral)   Resp 20   Ht 1.651 m (5' 5\")   Wt 70.8 kg (156 lb 1.6 oz)   LMP 09/29/2021   SpO2 99%   BMI 25.98 kg/m       9:11 AM MD updated: Pt potassium is 3.2. Can I get an order for RN replacement protocol. Thank you!    A/Ox4. SBA. LS clear Denies. NS 100ml/hr. Pt denied nausea or vomiting. Creat: 1.73. K:3.2- 40 mEq given with AM recheck ordered. Renal diet. Plan to discharge back to home today. Pt advised to follow up with PCP to set up fluid infusions as needed. AVS reviewed with patient. Patient is in stable condition, VSS, no co pain/cp/sob. All discharge education reviewed with pt in regards to: diet, activity, safety, s/s to report, medications and rx, follow up appointments/care.  All questions answered. Pt denies any further questions or concerns. PIV removed. No complications. Telemetry monitor removed. All belongings returned. Pt escorted to front door by [place] staff.  "

## 2021-10-09 DIAGNOSIS — Z71.89 OTHER SPECIFIED COUNSELING: ICD-10-CM

## 2021-10-11 ENCOUNTER — PATIENT OUTREACH (OUTPATIENT)
Dept: CARE COORDINATION | Facility: CLINIC | Age: 31
End: 2021-10-11

## 2021-10-11 NOTE — PROGRESS NOTES
"Clinic Care Coordination Contact  [facility]: Post-Discharge Note  SITUATION                                                      Admission:    Admission Date: 10/07/21   Reason for Admission: nausea, vomiting, abdominal pain  Discharge:   Discharge Date: 10/08/21  Discharge Diagnosis: nausea, vomiting, abdominal pain    BACKGROUND                                                      Kym Lemus is a 31-year-old female with past medical history significant for cyclical vomiting syndrome, previous history of marijuana use, stated her last use was in 03/2021, chronic abdominal pain, chronic kidney disease, hypertension, who presents to the Emergency Room today with a chief complaint of abdominal pain, nausea and vomiting of 4 days' duration with significant decreased oral intake and being admitted to the hospital for symptom control.  The patient states that she has been vomiting and not able to keep anything down for the last 4 days.  She is supposed to see a GI specialist next month.  She has no previous history of abdominal surgeries.  No new medication.  She states that she stopped smoking marijuana in 03/2021, but her urine was positive in 05/2021.  It is noted the last 2 urine toxicology were negative for marijuana.  In the Emergency Room, she was evaluated, discussed with Jeovanny Diggs from ED, who gave her bolus of IV fluid, nausea medications and IV Pepcid, and the patient felt better.    ASSESSMENT           Discharge Assessment  How are you doing now that you are home?: \"I'm doing ok, just set up an appointment for Wednesday and taking some time off\"  How are your symptoms? (Red Flag symptoms escalate to triage hotline per guidelines): Improved  Do you feel your condition is stable enough to be safe at home until your provider visit?: Yes  Does the patient have their discharge instructions? : Yes  Does the patient have questions regarding their discharge instructions? : No  Were you started on any " new medications or were there changes to any of your previous medications? : Yes  Does the patient have all of their medications?: Yes  Do you have questions regarding any of your medications? : No  Do you have all of your needed medical supplies or equipment (DME)?  (i.e. oxygen tank, CPAP, cane, etc.): Yes  Discharge follow-up appointment scheduled within 14 calendar days? : Yes  Discharge Follow Up Appointment Date: 10/13/21                  PLAN                                                      Outpatient Plan:    Follow-up with primary care physician on Monday with repeat BMP         No future appointments.      For any urgent concerns, please contact our 24 hour nurse triage line: [phone] ([phone])         Daiana Washburn  Community Health Worker  [facility]  [phone]

## 2021-10-28 ENCOUNTER — HOSPITAL ENCOUNTER (OUTPATIENT)
Facility: CLINIC | Age: 31
Setting detail: OBSERVATION
Discharge: HOME | End: 2021-10-28
Attending: EMERGENCY MEDICINE | Admitting: HOSPITALIST
Payer: COMMERCIAL

## 2021-10-28 VITALS
HEART RATE: 117 BPM | DIASTOLIC BLOOD PRESSURE: 117 MMHG | RESPIRATION RATE: 20 BRPM | SYSTOLIC BLOOD PRESSURE: 183 MMHG | TEMPERATURE: 97 F | OXYGEN SATURATION: 99 %

## 2021-10-28 DIAGNOSIS — R11.2 INTRACTABLE NAUSEA AND VOMITING: ICD-10-CM

## 2021-10-28 DIAGNOSIS — K31.84 GASTROPARESIS: ICD-10-CM

## 2021-10-28 DIAGNOSIS — R11.15 CYCLICAL VOMITING: ICD-10-CM

## 2021-10-28 LAB
ALBUMIN SERPL-MCNC: 4.6 G/DL (ref 3.4–5)
ALP SERPL-CCNC: 114 U/L (ref 40–150)
ALT SERPL W P-5'-P-CCNC: 24 U/L (ref 0–50)
ANION GAP SERPL CALCULATED.3IONS-SCNC: 13 MMOL/L (ref 3–14)
AST SERPL W P-5'-P-CCNC: 21 U/L (ref 0–45)
ATRIAL RATE - MUSE: 100 BPM
BASOPHILS # BLD AUTO: 0 10E3/UL (ref 0–0.2)
BASOPHILS NFR BLD AUTO: 0 %
BILIRUB SERPL-MCNC: 0.5 MG/DL (ref 0.2–1.3)
BUN SERPL-MCNC: 23 MG/DL (ref 7–30)
CALCIUM SERPL-MCNC: 10.5 MG/DL (ref 8.5–10.1)
CHLORIDE BLD-SCNC: 103 MMOL/L (ref 94–109)
CO2 SERPL-SCNC: 20 MMOL/L (ref 20–32)
CREAT SERPL-MCNC: 1.73 MG/DL (ref 0.52–1.04)
DIASTOLIC BLOOD PRESSURE - MUSE: NORMAL MMHG
EOSINOPHIL # BLD AUTO: 0 10E3/UL (ref 0–0.7)
EOSINOPHIL NFR BLD AUTO: 0 %
ERYTHROCYTE [DISTWIDTH] IN BLOOD BY AUTOMATED COUNT: 12.4 % (ref 10–15)
GFR SERPL CREATININE-BSD FRML MDRD: 38 ML/MIN/1.73M2
GLUCOSE BLD-MCNC: 212 MG/DL (ref 70–99)
HCG SERPL QL: NEGATIVE
HCT VFR BLD AUTO: 38.5 % (ref 35–47)
HGB BLD-MCNC: 12.5 G/DL (ref 11.7–15.7)
HOLD SPECIMEN: NORMAL
IMM GRANULOCYTES # BLD: 0 10E3/UL
IMM GRANULOCYTES NFR BLD: 0 %
INTERPRETATION ECG - MUSE: NORMAL
KETONES BLD-SCNC: 0.3 MMOL/L (ref 0–0.6)
LACTATE SERPL-SCNC: 2.4 MMOL/L (ref 0.7–2)
LACTATE SERPL-SCNC: 3.6 MMOL/L (ref 0.7–2)
LIPASE SERPL-CCNC: 172 U/L (ref 73–393)
LYMPHOCYTES # BLD AUTO: 1.6 10E3/UL (ref 0.8–5.3)
LYMPHOCYTES NFR BLD AUTO: 16 %
MCH RBC QN AUTO: 27.8 PG (ref 26.5–33)
MCHC RBC AUTO-ENTMCNC: 32.5 G/DL (ref 31.5–36.5)
MCV RBC AUTO: 86 FL (ref 78–100)
MONOCYTES # BLD AUTO: 0.5 10E3/UL (ref 0–1.3)
MONOCYTES NFR BLD AUTO: 5 %
NEUTROPHILS # BLD AUTO: 7.5 10E3/UL (ref 1.6–8.3)
NEUTROPHILS NFR BLD AUTO: 79 %
NRBC # BLD AUTO: 0 10E3/UL
NRBC BLD AUTO-RTO: 0 /100
P AXIS - MUSE: 73 DEGREES
PLATELET # BLD AUTO: 444 10E3/UL (ref 150–450)
POTASSIUM BLD-SCNC: 3.3 MMOL/L (ref 3.4–5.3)
PR INTERVAL - MUSE: 166 MS
PROT SERPL-MCNC: 9.3 G/DL (ref 6.8–8.8)
QRS DURATION - MUSE: 84 MS
QT - MUSE: 344 MS
QTC - MUSE: 443 MS
R AXIS - MUSE: 67 DEGREES
RBC # BLD AUTO: 4.49 10E6/UL (ref 3.8–5.2)
SARS-COV-2 RNA RESP QL NAA+PROBE: NEGATIVE
SODIUM SERPL-SCNC: 136 MMOL/L (ref 133–144)
SYSTOLIC BLOOD PRESSURE - MUSE: NORMAL MMHG
T AXIS - MUSE: 49 DEGREES
VENTRICULAR RATE- MUSE: 100 BPM
WBC # BLD AUTO: 9.7 10E3/UL (ref 4–11)

## 2021-10-28 PROCEDURE — 258N000001 HC RX 258: Performed by: EMERGENCY MEDICINE

## 2021-10-28 PROCEDURE — 87635 SARS-COV-2 COVID-19 AMP PRB: CPT | Performed by: EMERGENCY MEDICINE

## 2021-10-28 PROCEDURE — 84703 CHORIONIC GONADOTROPIN ASSAY: CPT | Performed by: EMERGENCY MEDICINE

## 2021-10-28 PROCEDURE — 250N000011 HC RX IP 250 OP 636: Performed by: EMERGENCY MEDICINE

## 2021-10-28 PROCEDURE — 99220 PR INITIAL OBSERVATION CARE,LEVEL III: CPT | Performed by: HOSPITALIST

## 2021-10-28 PROCEDURE — 96366 THER/PROPH/DIAG IV INF ADDON: CPT

## 2021-10-28 PROCEDURE — 85025 COMPLETE CBC W/AUTO DIFF WBC: CPT | Performed by: EMERGENCY MEDICINE

## 2021-10-28 PROCEDURE — 36415 COLL VENOUS BLD VENIPUNCTURE: CPT | Performed by: EMERGENCY MEDICINE

## 2021-10-28 PROCEDURE — 93005 ELECTROCARDIOGRAM TRACING: CPT

## 2021-10-28 PROCEDURE — 96365 THER/PROPH/DIAG IV INF INIT: CPT

## 2021-10-28 PROCEDURE — 83605 ASSAY OF LACTIC ACID: CPT | Performed by: EMERGENCY MEDICINE

## 2021-10-28 PROCEDURE — 96374 THER/PROPH/DIAG INJ IV PUSH: CPT

## 2021-10-28 PROCEDURE — 258N000003 HC RX IP 258 OP 636: Performed by: EMERGENCY MEDICINE

## 2021-10-28 PROCEDURE — 96361 HYDRATE IV INFUSION ADD-ON: CPT

## 2021-10-28 PROCEDURE — 96368 THER/DIAG CONCURRENT INF: CPT

## 2021-10-28 PROCEDURE — C9803 HOPD COVID-19 SPEC COLLECT: HCPCS

## 2021-10-28 PROCEDURE — G0378 HOSPITAL OBSERVATION PER HR: HCPCS

## 2021-10-28 PROCEDURE — 83690 ASSAY OF LIPASE: CPT | Performed by: EMERGENCY MEDICINE

## 2021-10-28 PROCEDURE — 250N000013 HC RX MED GY IP 250 OP 250 PS 637: Performed by: HOSPITALIST

## 2021-10-28 PROCEDURE — 250N000011 HC RX IP 250 OP 636: Performed by: HOSPITALIST

## 2021-10-28 PROCEDURE — 80053 COMPREHEN METABOLIC PANEL: CPT | Performed by: EMERGENCY MEDICINE

## 2021-10-28 PROCEDURE — 96376 TX/PRO/DX INJ SAME DRUG ADON: CPT

## 2021-10-28 PROCEDURE — 96375 TX/PRO/DX INJ NEW DRUG ADDON: CPT

## 2021-10-28 PROCEDURE — 99285 EMERGENCY DEPT VISIT HI MDM: CPT | Mod: 25

## 2021-10-28 PROCEDURE — 82010 KETONE BODYS QUAN: CPT | Performed by: EMERGENCY MEDICINE

## 2021-10-28 PROCEDURE — 96360 HYDRATION IV INFUSION INIT: CPT

## 2021-10-28 RX ORDER — NICOTINE POLACRILEX 4 MG
15-30 LOZENGE BUCCAL
Status: DISCONTINUED | OUTPATIENT
Start: 2021-10-28 | End: 2021-10-28 | Stop reason: HOSPADM

## 2021-10-28 RX ORDER — AMLODIPINE BESYLATE 5 MG/1
10 TABLET ORAL DAILY
Status: DISCONTINUED | OUTPATIENT
Start: 2021-10-28 | End: 2021-10-28 | Stop reason: HOSPADM

## 2021-10-28 RX ORDER — ONDANSETRON 4 MG/1
4 TABLET, ORALLY DISINTEGRATING ORAL EVERY 6 HOURS PRN
Qty: 10 TABLET | Refills: 0 | Status: SHIPPED | OUTPATIENT
Start: 2021-10-28 | End: 2022-07-30

## 2021-10-28 RX ORDER — ONDANSETRON 4 MG/1
4 TABLET, ORALLY DISINTEGRATING ORAL EVERY 8 HOURS PRN
Qty: 10 TABLET | Refills: 0 | Status: SHIPPED | OUTPATIENT
Start: 2021-10-28 | End: 2021-10-28

## 2021-10-28 RX ORDER — METOCLOPRAMIDE HYDROCHLORIDE 5 MG/ML
10 INJECTION INTRAMUSCULAR; INTRAVENOUS EVERY 6 HOURS PRN
Status: DISCONTINUED | OUTPATIENT
Start: 2021-10-28 | End: 2021-10-28 | Stop reason: HOSPADM

## 2021-10-28 RX ORDER — PROCHLORPERAZINE 25 MG
25 SUPPOSITORY, RECTAL RECTAL EVERY 12 HOURS PRN
Status: DISCONTINUED | OUTPATIENT
Start: 2021-10-28 | End: 2021-10-28 | Stop reason: HOSPADM

## 2021-10-28 RX ORDER — SODIUM CHLORIDE AND POTASSIUM CHLORIDE 150; 900 MG/100ML; MG/100ML
INJECTION, SOLUTION INTRAVENOUS CONTINUOUS
Status: DISCONTINUED | OUTPATIENT
Start: 2021-10-28 | End: 2021-10-28 | Stop reason: HOSPADM

## 2021-10-28 RX ORDER — ONDANSETRON 2 MG/ML
4 INJECTION INTRAMUSCULAR; INTRAVENOUS EVERY 6 HOURS PRN
Status: DISCONTINUED | OUTPATIENT
Start: 2021-10-28 | End: 2021-10-28 | Stop reason: HOSPADM

## 2021-10-28 RX ORDER — VENLAFAXINE HYDROCHLORIDE 37.5 MG/1
37.5 TABLET, EXTENDED RELEASE ORAL
Status: DISCONTINUED | OUTPATIENT
Start: 2021-10-29 | End: 2021-10-28 | Stop reason: HOSPADM

## 2021-10-28 RX ORDER — DEXTROSE MONOHYDRATE 25 G/50ML
25-50 INJECTION, SOLUTION INTRAVENOUS
Status: DISCONTINUED | OUTPATIENT
Start: 2021-10-28 | End: 2021-10-28 | Stop reason: HOSPADM

## 2021-10-28 RX ORDER — NORTRIPTYLINE HCL 10 MG
30 CAPSULE ORAL AT BEDTIME
Status: DISCONTINUED | OUTPATIENT
Start: 2021-10-28 | End: 2021-10-28 | Stop reason: HOSPADM

## 2021-10-28 RX ORDER — ACETAMINOPHEN 325 MG/1
650 TABLET ORAL EVERY 4 HOURS PRN
Status: DISCONTINUED | OUTPATIENT
Start: 2021-10-28 | End: 2021-10-28 | Stop reason: HOSPADM

## 2021-10-28 RX ORDER — PROCHLORPERAZINE MALEATE 10 MG
10 TABLET ORAL EVERY 6 HOURS PRN
Status: DISCONTINUED | OUTPATIENT
Start: 2021-10-28 | End: 2021-10-28 | Stop reason: HOSPADM

## 2021-10-28 RX ORDER — HALOPERIDOL 5 MG/ML
2.5 INJECTION INTRAMUSCULAR ONCE
Status: COMPLETED | OUTPATIENT
Start: 2021-10-28 | End: 2021-10-28

## 2021-10-28 RX ORDER — LABETALOL HYDROCHLORIDE 5 MG/ML
10 INJECTION, SOLUTION INTRAVENOUS EVERY 4 HOURS PRN
Status: DISCONTINUED | OUTPATIENT
Start: 2021-10-28 | End: 2021-10-28 | Stop reason: HOSPADM

## 2021-10-28 RX ORDER — LORAZEPAM 2 MG/ML
1 INJECTION INTRAMUSCULAR ONCE
Status: COMPLETED | OUTPATIENT
Start: 2021-10-28 | End: 2021-10-28

## 2021-10-28 RX ORDER — ACETAMINOPHEN 650 MG/1
650 SUPPOSITORY RECTAL EVERY 4 HOURS PRN
Status: DISCONTINUED | OUTPATIENT
Start: 2021-10-28 | End: 2021-10-28 | Stop reason: HOSPADM

## 2021-10-28 RX ORDER — CAPSAICIN 0.025 %
CREAM (GRAM) TOPICAL 3 TIMES DAILY
Status: DISCONTINUED | OUTPATIENT
Start: 2021-10-28 | End: 2021-10-28 | Stop reason: HOSPADM

## 2021-10-28 RX ORDER — PROCHLORPERAZINE 25 MG
25 SUPPOSITORY, RECTAL RECTAL EVERY 12 HOURS PRN
Qty: 10 SUPPOSITORY | Refills: 0 | Status: SHIPPED | OUTPATIENT
Start: 2021-10-28 | End: 2021-10-28

## 2021-10-28 RX ORDER — ONDANSETRON 4 MG/1
4 TABLET, ORALLY DISINTEGRATING ORAL EVERY 6 HOURS PRN
Status: DISCONTINUED | OUTPATIENT
Start: 2021-10-28 | End: 2021-10-28 | Stop reason: HOSPADM

## 2021-10-28 RX ORDER — DIPHENHYDRAMINE HYDROCHLORIDE 50 MG/ML
25 INJECTION INTRAMUSCULAR; INTRAVENOUS ONCE
Status: COMPLETED | OUTPATIENT
Start: 2021-10-28 | End: 2021-10-28

## 2021-10-28 RX ORDER — PROCHLORPERAZINE 25 MG
25 SUPPOSITORY, RECTAL RECTAL EVERY 12 HOURS PRN
Qty: 10 SUPPOSITORY | Refills: 0 | Status: SHIPPED | OUTPATIENT
Start: 2021-10-28

## 2021-10-28 RX ORDER — HALOPERIDOL 5 MG/ML
2.5 INJECTION INTRAMUSCULAR
Status: COMPLETED | OUTPATIENT
Start: 2021-10-28 | End: 2021-10-28

## 2021-10-28 RX ORDER — ONDANSETRON 2 MG/ML
4 INJECTION INTRAMUSCULAR; INTRAVENOUS ONCE
Status: COMPLETED | OUTPATIENT
Start: 2021-10-28 | End: 2021-10-28

## 2021-10-28 RX ORDER — KETOROLAC TROMETHAMINE 15 MG/ML
10 INJECTION, SOLUTION INTRAMUSCULAR; INTRAVENOUS ONCE
Status: DISCONTINUED | OUTPATIENT
Start: 2021-10-28 | End: 2021-10-28

## 2021-10-28 RX ORDER — HYDROMORPHONE HCL IN WATER/PF 6 MG/30 ML
0.2 PATIENT CONTROLLED ANALGESIA SYRINGE INTRAVENOUS EVERY 4 HOURS PRN
Status: DISCONTINUED | OUTPATIENT
Start: 2021-10-28 | End: 2021-10-28 | Stop reason: HOSPADM

## 2021-10-28 RX ADMIN — SODIUM CHLORIDE, POTASSIUM CHLORIDE, SODIUM LACTATE AND CALCIUM CHLORIDE 1000 ML: 600; 310; 30; 20 INJECTION, SOLUTION INTRAVENOUS at 09:45

## 2021-10-28 RX ADMIN — POTASSIUM CHLORIDE AND SODIUM CHLORIDE: 900; 150 INJECTION, SOLUTION INTRAVENOUS at 16:36

## 2021-10-28 RX ADMIN — AMLODIPINE BESYLATE 10 MG: 5 TABLET ORAL at 17:57

## 2021-10-28 RX ADMIN — ONDANSETRON 4 MG: 2 INJECTION INTRAMUSCULAR; INTRAVENOUS at 11:42

## 2021-10-28 RX ADMIN — DIPHENHYDRAMINE HYDROCHLORIDE 25 MG: 50 INJECTION INTRAMUSCULAR; INTRAVENOUS at 07:58

## 2021-10-28 RX ADMIN — LORAZEPAM 1 MG: 2 INJECTION INTRAMUSCULAR; INTRAVENOUS at 07:58

## 2021-10-28 RX ADMIN — PROCHLORPERAZINE EDISYLATE 10 MG: 5 INJECTION INTRAMUSCULAR; INTRAVENOUS at 13:38

## 2021-10-28 RX ADMIN — HALOPERIDOL LACTATE 2.5 MG: 5 INJECTION, SOLUTION INTRAMUSCULAR at 08:32

## 2021-10-28 RX ADMIN — SODIUM CHLORIDE, POTASSIUM CHLORIDE, SODIUM LACTATE AND CALCIUM CHLORIDE 1000 ML: 600; 310; 30; 20 INJECTION, SOLUTION INTRAVENOUS at 08:00

## 2021-10-28 RX ADMIN — DEXTROSE AND SODIUM CHLORIDE: 5; 450 INJECTION, SOLUTION INTRAVENOUS at 14:16

## 2021-10-28 RX ADMIN — HALOPERIDOL LACTATE 2.5 MG: 5 INJECTION, SOLUTION INTRAMUSCULAR at 09:46

## 2021-10-28 ASSESSMENT — ENCOUNTER SYMPTOMS
DIARRHEA: 0
VOMITING: 1
FEVER: 0
DYSURIA: 0
ABDOMINAL PAIN: 1

## 2021-10-28 NOTE — ED PROVIDER NOTES
History     Chief Complaint:  Abdominal Pain     The history is provided by the patient and a friend.      Kym Lemus is a 31 year old female with history of cannabinoid hyperemesis syndrome, type II diabetes mellitus, hypertension, gastroparesis who presents with epigastric pain and vomiting since yesterday. She reports that she has had approximately 12 episodes of yellow-colored emesis in the past 24 hours. She has been taking hot showers with some improvement of symptoms. She was recently admitted to the hospital on 10/7 and was found to have cyclical vomiting syndrome. After her discharge, she was feeling improved before her symptoms started again. She has been taking her medications as prescribed and took nortriptyline this morning prior to arrival here. She denies sick contacts and does not know what triggered this episode. Kym denies diarrhea, fever, dysuria.    Review of Systems   Constitutional: Negative for fever.   Gastrointestinal: Positive for abdominal pain and vomiting. Negative for diarrhea.   Genitourinary: Negative for dysuria.   All other systems reviewed and are negative.    Allergies:  Metformin    Medications:  Amlodipine  Nortriptyline  Zofran  Effexor  Compazine  Protonix  Zyprexa  Insulin  Hydroxyzine  Venlafaxine  Valacyclovir    Past Medical History:     Hypertension  Cannabinoid hyperemesis syndrome   Cervical intraepithelial glandular neoplasia   Diabetes mellitus type 2  Gastritis   Gastroparesis   Hyponatremia  Kidney failure  Hypokalemia  Marijuana use  Adjustment disorder  Chronic renal insufficiency    Past Surgical History:    Cervical LEEP    Family History:    Mother: type II diabetes mellitus, hypertension, cataracts, glaucoma    Social History:  Presents with a friend.  Presents via car.    Physical Exam     Patient Vitals for the past 24 hrs:   BP Temp Temp src Pulse Resp SpO2   10/28/21 1330 (!) 183/114 -- -- 116 -- --   10/28/21 1315 (!) 169/112 -- -- (!) 140  -- 98 %   10/28/21 1300 (!) 169/114 -- -- (!) 122 -- 100 %   10/28/21 1245 105/64 -- -- 103 -- 100 %   10/28/21 1200 136/83 -- -- 102 -- 100 %   10/28/21 1145 (!) 188/124 -- -- (!) 136 -- 100 %   10/28/21 1130 (!) 184/105 -- -- 112 -- 100 %   10/28/21 1115 (!) 188/111 -- -- 106 -- 100 %   10/28/21 1100 -- -- -- -- -- 99 %   10/28/21 1000 (!) 193/120 -- -- 107 -- 100 %   10/28/21 0900 (!) 173/111 -- -- 113 -- 100 %   10/28/21 0800 (!) 174/119 -- -- 110 -- --   10/28/21 0728 (!) 191/124 -- -- -- -- --   10/28/21 0725 -- 97  F (36.1  C) Temporal 115 20 100 %       Physical Exam  Gen: Comfortable appearing, dry heaving  HENT:  mmm, no rhinorrhea  Eyes: periorbital tissues and sclera normal   Neck: supple, no abnormal swelling  Lungs:  CTAB,  no resp distress  CV: rrr, no m/r/g, ppi  Abd: soft, no localizing tenderness palpation, nondistended, no rebound/masses/guarding/hsm  Ext: no peripheral edema  Skin: warm, dry, well perfused, no rashes/bruising/lesions on exposed skin  Neuro: alert, MAEE, no gross motor or sensory deficits, gait stable  Psych: Normal mood, normal affect    Emergency Department Course     ECG  ECG taken at 0809, ECG read at 0812  Normal sinus rhythm  Nonspecific T wave abnormality  Abnormal ECG  No significant change as compared to prior, dated 9/26/21.  Rate 100 bpm. HI interval 166 ms. QRS duration 84 ms. QT/QTc 344/443 ms. P-R-T axes 73 67 49.     Laboratory:  Labs Ordered and Resulted from Time of ED Arrival to Time of ED Departure   COMPREHENSIVE METABOLIC PANEL - Abnormal       Result Value    Sodium 136      Potassium 3.3 (*)     Chloride 103      Carbon Dioxide (CO2) 20      Anion Gap 13      Urea Nitrogen 23      Creatinine 1.73 (*)     Calcium 10.5 (*)     Glucose 212 (*)     Alkaline Phosphatase 114      AST 21      ALT 24      Protein Total 9.3 (*)     Albumin 4.6      Bilirubin Total 0.5      GFR Estimate 38 (*)    LACTIC ACID WHOLE BLOOD - Abnormal    Lactic Acid 3.6 (*)    LACTIC ACID  WHOLE BLOOD - Abnormal    Lactic Acid 2.4 (*)    LIPASE - Normal    Lipase 172     KETONE BETA-HYDROXYBUTYRATE QUANTITATIVE, RAPID - Normal    Ketone (Beta-Hydroxybutyrate) Quantitative 0.3     HCG QUALITATIVE PREGNANCY - Normal    hCG Serum Qualitative Negative     CBC WITH PLATELETS AND DIFFERENTIAL    WBC Count 9.7      RBC Count 4.49      Hemoglobin 12.5      Hematocrit 38.5      MCV 86      MCH 27.8      MCHC 32.5      RDW 12.4      Platelet Count 444      % Neutrophils 79      % Lymphocytes 16      % Monocytes 5      % Eosinophils 0      % Basophils 0      % Immature Granulocytes 0      NRBCs per 100 WBC 0      Absolute Neutrophils 7.5      Absolute Lymphocytes 1.6      Absolute Monocytes 0.5      Absolute Eosinophils 0.0      Absolute Basophils 0.0      Absolute Immature Granulocytes 0.0      Absolute NRBCs 0.0     ROUTINE UA WITH MICROSCOPIC   COVID-19 VIRUS (CORONAVIRUS) BY PCR     Emergency Department Course:  Reviewed:  I reviewed nursing notes, vitals, past medical history and Care Everywhere    Assessments:  0742 I obtained history and examined the patient as noted above.   1002 I rechecked the patient and explained findings.   1136 I rechecked the patient.  1215 I rechecked the patient.    Consults:  1127 I spoke with Dr. Ellis from the hospitalist service regarding the patient's presentation, findings here in the ED, and plan of care.    Interventions:  0758 Ativan 1 mg IV  0758 Benadryl 25 mg IV  0800 Lactated ringers 1 L IV  0832 Haldol 2.5 mg IV  0945 Lactated ringers 1 L IV  0946 Haldol 2.5 mg IV  1142 Zofran 4 mg IV    Disposition:  The patient was admitted to the hospital under the care of Dr. Ellis.     Impression & Plan     Medical Decision Making:  Generalized abdominal pain associated with nonbilious nonbloody emesis likely representing flare of cyclical vomiting syndrome.  Low suspicion for surgical vascular or infectious emergency.  No advanced imaging indicated.    Unfortunately unable to  get her to transition to adequate p.o. intake despite multiple antiemetics here in the ED.  Plan will be admission to the observation unit for continued evaluation and management.  Currently boarding in ED d/t bed capacity, if able to tolerate PO intake prior to teagan being available, DC home.     Diagnosis:    ICD-10-CM    1. Cyclical vomiting  R11.15    2. Intractable nausea and vomiting  R11.2    3. Gastroparesis  K31.84        Scribe Disclosure:  MELVI, Yessy Tanner, am serving as a scribe at 7:42 AM on 10/28/2021 to document services personally performed by Ramos Escalera MD based on my observations and the provider's statements to me.      Ramos Escalera MD  10/28/21 2042

## 2021-10-28 NOTE — H&P
[facility]    History and Physical - Hospitalist Service       Date of Admission:  10/28/2021    Assessment & Plan      Kym Lemus is a 31 year old with known history of recurrent issues of nausea, vomiting and abdominal pain initially felt in the past secondary to underlying diabetic gastroparesis now suspected cannabis hyperemesis syndrome female, CKD, DM (diet controlled), hypertension, with also concerns for getting fractured care as she has several hospitalizations, ED visits and subspecialty follow-up in multiple different healthcare system as documented on her last discharge summary. She presented in the emergency room 10/28/2021 for nausea, vomiting, abdominal pain    Nausea, vomiting, abdominal pain     - likely due to cannabinoid hyperemesis syndrome    - supportive care: IVF, anti-emetics, pain control, capsaicin cream    Hypokalemia    - replace and add to fluid    CKD    - at her baseline    HTN    - resume previous amlodipine    - PRN labetalol    DM    - diet controlled    - ISS     Full Code     Diet:  clears  DVT Prophylaxis: Low Risk/Ambulatory with no VTE prophylaxis indicated  Gallagher Catheter: Not present  Central Lines: None  Code Status:   Full    Clinically Significant Risk Factors Present on Admission          # Hypercalcemia: Ca = 10.5 mg/dL (Ref range: 8.5 - 10.1 mg/dL) and/or iCa = N/A on admission, will monitor as appropriate          Disposition Plan   Expected discharge:  1 day recommended to prior living arrangement once tolerating PO.     The patient's care was discussed with the Bedside Nurse, Patient, Patient's Family and ED Provider.    Mac Ellis MD  [facility]  Securely message with the [employer] Web Console (learn more here)  Text page via [employer] Paging/Directory        ______________________________________________________________________    Chief Complaint   Nausea, vomiting, abdominal pain    History is obtained from the  patient    History of Present Illness   Kym Lemus is a 31 year old with known history of recurrent issues of nausea, vomiting and abdominal pain initially felt in the past secondary to underlying diabetic gastroparesis now suspected cannabis hyperemesis syndrome female, CKD, DM (diet controlled), hypertension, with also concerns for getting fractured care as she has several hospitalizations, ED visits and subspecialty follow-up in multiple different healthcare system as documented on her last discharge summary. She presented in the emergency room 10/28/2021 for nausea, vomiting, abdominal pain.  Patient states on Wednesday she started having nausea vomiting and abdominal pain.  This is similar to her previous episodes for which she has been hospitalized for.  She has vomited about 12 times since her symptoms started.  She states she cannot keep anything down.  Her abdominal pain is generalized.  No chest pain, shortness of breath, diarrhea.  No cough, runny nose, sore throat.  No fevers or chills.  No urinary symptoms.  She states that she stopped using marijuana in September prior to her previous hospitalization.  She states hot showers have been helping.      Review of Systems    The 10 point Review of Systems is negative other than noted in the HPI or here.     Past Medical History    I have reviewed this patient's medical history and updated it with pertinent information if needed.   Past Medical History:   Diagnosis Date     Benign essential hypertension      Cannabinoid hyperemesis syndrome      Cervical intraepithelial glandular neoplasia     s/p LEEP     Diabetes mellitus type 2, diet-controlled (H)      Gastritis      Gastroparesis     and canabinoid hyperemesis cb DEREK       Past Surgical History   I have reviewed this patient's surgical history and updated it with pertinent information if needed.  Past Surgical History:   Procedure Laterality Date     LEEP TX, CERVICAL      EYAL !!       Social  History   I have reviewed this patient's social history and updated it with pertinent information if needed.  Social History     Tobacco Use     Smoking status: Never Smoker     Smokeless tobacco: Never Used   Substance Use Topics     Alcohol use: Not Currently     Drug use: Yes     Types: Marijuana       Family History   I have reviewed this patient's family history and updated it with pertinent information if needed.  Family History   Problem Relation Age of Onset     Hypertension Mother      Diabetes Type 2  Mother      Cataracts Mother        Prior to Admission Medications   Prior to Admission Medications   Prescriptions Last Dose Informant Patient Reported? Taking?   amLODIPine (NORVASC) 10 MG tablet 10/27/2021 at am  No Yes   Sig: Take 1 tablet (10 mg) by mouth daily   nortriptyline (PAMELOR) 10 MG capsule 10/27/2021 at pm Self Yes Yes   Sig: Take 30 mg by mouth At Bedtime    ondansetron (ZOFRAN-ODT) 4 MG ODT tab  at no recent usage  No No   Sig: Take 1 tablet (4 mg) by mouth every 6 hours as needed for nausea   venlafaxine (EFFEXOR-ER) 37.5 MG 24 hr tablet 10/27/2021 at am  No Yes   Sig: Take 1 tablet (37.5 mg) by mouth daily (with breakfast)      Facility-Administered Medications: None     Allergies   Allergies   Allergen Reactions     Metformin Diarrhea       Physical Exam   Vital Signs: Temp: 97  F (36.1  C) Temp src: Temporal BP: (!) 176/134 Pulse: 103   Resp: 20 SpO2: 100 % O2 Device: None (Room air)    Weight: 0 lbs 0 oz    Constitutional: tired. Appropriate.   Eyes: Lids and lashes normal, pupils equal, round and reactive to light, extra ocular muscles intact, sclera clear, conjunctiva normal  ENT: Normocephalic, without obvious abnormality, atraumatic, sinuses nontender on palpation, external ears without lesions, oral pharynx with moist mucous membranes, tonsils without erythema or exudates, gums normal and good dentition.  Respiratory: No increased work of breathing, good air exchange, clear to  auscultation bilaterally, no crackles or wheezing  Cardiovascular: Normal apical impulse, regular rate and rhythm, normal S1 and S2, no S3 or S4, and no murmur noted  GI: No scars, normal bowel sounds, soft, non-distended, non-tender, no masses palpated, no hepatosplenomegally. Not tender  Skin: no bruising or bleeding    Data   Data reviewed today: I reviewed all medications, new labs and imaging results over the last 24 hours. I personally reviewed no images or EKG's today.    Most Recent 3 CBC's:Recent Labs   Lab Test 10/28/21  0738 10/08/21  0637 10/07/21  1838   WBC 9.7 9.8 13.1*   HGB 12.5 10.9* 12.5   MCV 86 86 84    357 463*     Most Recent 3 BMP's:Recent Labs   Lab Test 10/28/21  0738 10/08/21  0917 10/08/21  0637 10/07/21  1838 10/07/21  1838     --  132*  --  132*   POTASSIUM 3.3* 3.5 3.2*   < > 3.6   CHLORIDE 103  --  102  --  92*   CO2 20  --  24  --  28   BUN 23  --  29  --  43*   CR 1.73*  --  1.73*  --  2.26*   ANIONGAP 13  --  6  --  12   DIMITRI 10.5*  --  8.4*  --  9.7   *  --  151*  --  163*    < > = values in this interval not displayed.     Most Recent 2 LFT's:Recent Labs   Lab Test 10/28/21  0738 10/07/21  1838   AST 21 23   ALT 24 22   ALKPHOS 114 113   BILITOTAL 0.5 0.9     No results found for this or any previous visit (from the past 24 hour(s)).

## 2021-10-28 NOTE — ED TRIAGE NOTES
Patient c/o epigastric pain that started yesterday. Vomited multiple times. Denies diarrhea. Denies fevers. Denies any surgeries on abdomen. Denies any chance of pregnancy. ABCs intact in triage.

## 2021-10-28 NOTE — PHARMACY-ADMISSION MEDICATION HISTORY
Admission medication history interview status for this patient is complete. See [facility] admission navigator for allergy information, prior to admission medications and immunization status.     Medication history interview source(s):Patient  Medication history resources (including written lists, pill bottles, clinic record):[facility] list from 10/8/21 discharge  [facility]    Changes made to [facility] medication list:  Added: ----  Deleted: ----  Changed: ----    Actions taken by pharmacist (provider contacted, etc):None     Additional medication history information:None    Medication reconciliation/reorder completed by provider prior to medication history? No      For patients on insulin therapy:N  Prior to Admission medications    Medication Sig Last Dose Taking? Auth Provider   amLODIPine (NORVASC) 10 MG tablet Take 1 tablet (10 mg) by mouth daily 10/27/2021 at am Yes Ty Arce MD   nortriptyline (PAMELOR) 10 MG capsule Take 30 mg by mouth At Bedtime  10/27/2021 at pm Yes Reported, Patient   ondansetron (ZOFRAN-ODT) 4 MG ODT tab Take 1 tablet (4 mg) by mouth every 6 hours as needed for nausea  at no recent usage Yes Jaswinder Gill MD   venlafaxine (EFFEXOR-ER) 37.5 MG 24 hr tablet Take 1 tablet (37.5 mg) by mouth daily (with breakfast) 10/27/2021 at am Yes Jerald Giordano APRN CNP

## 2021-10-28 NOTE — ED NOTES
[facility]  ED Nurse Handoff Report    Kym Lemus is a 31 year old female   ED Chief complaint: Abdominal Pain  . ED Diagnosis:   Final diagnoses:   Cyclical vomiting   Intractable nausea and vomiting   Gastroparesis     Allergies:   Allergies   Allergen Reactions     Metformin Diarrhea       Code Status: Full Code  Activity level - Baseline/Home:  Independent. Activity Level - Current:   Stand by Assist. Lift room needed: No. Bariatric: No   Needed: No   Isolation: No. Infection: Not Applicable  COVID r/o and special precautions.     Vital Signs:   Vitals:    10/28/21 1315 10/28/21 1330 10/28/21 1345 10/28/21 1353   BP: (!) 169/112 (!) 183/114 (!) 176/134    Pulse: (!) 140 116 103    Resp:       Temp:       TempSrc:       SpO2: 98%  100% 100%       Cardiac Rhythm:  ,      Pain level:    Patient confused: No. Patient Falls Risk: Yes.   Elimination Status: Has voided   Patient Report - Initial Complaint: Abdominal pain, vomiting. Focused Assessment: Euphoric, nausea, vomiting, sweating, pacing, generalized weakness   Tests Performed:   Labs Ordered and Resulted from Time of ED Arrival to Time of ED Departure   COMPREHENSIVE METABOLIC PANEL - Abnormal       Result Value    Sodium 136      Potassium 3.3 (*)     Chloride 103      Carbon Dioxide (CO2) 20      Anion Gap 13      Urea Nitrogen 23      Creatinine 1.73 (*)     Calcium 10.5 (*)     Glucose 212 (*)     Alkaline Phosphatase 114      AST 21      ALT 24      Protein Total 9.3 (*)     Albumin 4.6      Bilirubin Total 0.5      GFR Estimate 38 (*)    LACTIC ACID WHOLE BLOOD - Abnormal    Lactic Acid 3.6 (*)    LACTIC ACID WHOLE BLOOD - Abnormal    Lactic Acid 2.4 (*)    LIPASE - Normal    Lipase 172     KETONE BETA-HYDROXYBUTYRATE QUANTITATIVE, RAPID - Normal    Ketone (Beta-Hydroxybutyrate) Quantitative 0.3     HCG QUALITATIVE PREGNANCY - Normal    hCG Serum Qualitative Negative     CBC WITH PLATELETS AND DIFFERENTIAL    WBC Count 9.7       RBC Count 4.49      Hemoglobin 12.5      Hematocrit 38.5      MCV 86      MCH 27.8      MCHC 32.5      RDW 12.4      Platelet Count 444      % Neutrophils 79      % Lymphocytes 16      % Monocytes 5      % Eosinophils 0      % Basophils 0      % Immature Granulocytes 0      NRBCs per 100 WBC 0      Absolute Neutrophils 7.5      Absolute Lymphocytes 1.6      Absolute Monocytes 0.5      Absolute Eosinophils 0.0      Absolute Basophils 0.0      Absolute Immature Granulocytes 0.0      Absolute NRBCs 0.0     ROUTINE UA WITH MICROSCOPIC   COVID-19 VIRUS (CORONAVIRUS) BY PCR     No orders to display       . Abnormal Results: See above.   Treatments provided: Antiemetics, haldol, saline  Family Comments: N/A  OBS brochure/video discussed/provided to patient:  Yes  ED Medications:   Medications   dextrose 5% and 0.45% NaCl infusion (has no administration in time range)   lactated ringers BOLUS 1,000 mL (1,000 mLs Intravenous New Bag 10/28/21 0800)   haloperidol lactate (HALDOL) injection 2.5 mg (2.5 mg Intravenous Given 10/28/21 0832)   diphenhydrAMINE (BENADRYL) injection 25 mg (25 mg Intravenous Given 10/28/21 0758)   LORazepam (ATIVAN) injection 1 mg (1 mg Intravenous Given 10/28/21 0758)   lactated ringers BOLUS 1,000 mL (1,000 mLs Intravenous New Bag 10/28/21 0945)   haloperidol lactate (HALDOL) injection 2.5 mg (2.5 mg Intravenous Given 10/28/21 0946)   ondansetron (ZOFRAN) injection 4 mg (4 mg Intravenous Given 10/28/21 1142)   prochlorperazine (COMPAZINE) injection 10 mg (10 mg Intravenous Given 10/28/21 1338)     Drips infusing:  No  For the majority of the shift, the patient's behavior Green. Interventions performed were N/A.    Sepsis treatment initiated: No     Patient tested for COVID 19 prior to admission: YES    ED Nurse Name/Phone Number: Jorge Alberto Vazquez RN,   2:11 PM

## 2021-10-29 ENCOUNTER — PATIENT OUTREACH (OUTPATIENT)
Dept: CARE COORDINATION | Facility: CLINIC | Age: 31
End: 2021-10-29

## 2021-10-29 DIAGNOSIS — Z71.89 OTHER SPECIFIED COUNSELING: ICD-10-CM

## 2021-10-29 NOTE — PROGRESS NOTES
Clinic Care Coordination Contact  [facility]/Voicemail       Clinical Data: Care Coordinator Outreach  Outreach attempted x 1.  Left message on patient's voicemail with call back information and requested return call.  Plan:  Care Coordinator will try to reach patient again in 1-2 business days.    Sumit Mcfarlane  Community Health Worker  [facility]  Ph:[phone]

## 2021-10-30 NOTE — PROGRESS NOTES
Clinic Care Coordination Contact  [facility]/Voicemail       Clinical Data: Care Coordinator Outreach  Outreach attempted x 2.  Left message on patient's voicemail with call back information and requested return call.  Plan:  Care Coordinator will do no further outreaches at this time.      BRAYDON Rios  [phone]  [facility]

## 2022-03-08 NOTE — PROVIDER NOTIFICATION
Admission pager texted: New admit this evening. UA and drug screen urine results back. Also has new CT results that are normal but pt reports no BM since Saturday. Not eating for last four days though. Tele shows ST with inverted T waves. Not sure of significance.   None

## 2022-07-30 ENCOUNTER — HOSPITAL ENCOUNTER (INPATIENT)
Facility: CLINIC | Age: 32
LOS: 1 days | Discharge: HOME | DRG: 682 | End: 2022-07-30
Attending: EMERGENCY MEDICINE | Admitting: STUDENT IN AN ORGANIZED HEALTH CARE EDUCATION/TRAINING PROGRAM
Payer: COMMERCIAL

## 2022-07-30 VITALS
HEART RATE: 115 BPM | SYSTOLIC BLOOD PRESSURE: 143 MMHG | OXYGEN SATURATION: 95 % | RESPIRATION RATE: 8 BRPM | TEMPERATURE: 98.4 F | DIASTOLIC BLOOD PRESSURE: 123 MMHG

## 2022-07-30 DIAGNOSIS — E86.0 DEHYDRATION: ICD-10-CM

## 2022-07-30 DIAGNOSIS — N17.9 ACUTE KIDNEY INJURY (H): ICD-10-CM

## 2022-07-30 DIAGNOSIS — R11.2 NON-INTRACTABLE VOMITING WITH NAUSEA, UNSPECIFIED VOMITING TYPE: ICD-10-CM

## 2022-07-30 LAB
ANION GAP SERPL CALCULATED.3IONS-SCNC: 21 MMOL/L (ref 7–15)
BASE EXCESS BLDV CALC-SCNC: 2.3 MMOL/L (ref -7.7–1.9)
BASOPHILS # BLD AUTO: 0 10E3/UL (ref 0–0.2)
BASOPHILS NFR BLD AUTO: 0 %
BUN SERPL-MCNC: 43.7 MG/DL (ref 6–20)
CALCIUM SERPL-MCNC: 10.3 MG/DL (ref 8.6–10)
CHLORIDE SERPL-SCNC: 95 MMOL/L (ref 98–107)
CREAT SERPL-MCNC: 2.8 MG/DL (ref 0.51–0.95)
CRP SERPL-MCNC: <3 MG/L
DEPRECATED HCO3 PLAS-SCNC: 22 MMOL/L (ref 22–29)
EOSINOPHIL # BLD AUTO: 0 10E3/UL (ref 0–0.7)
EOSINOPHIL NFR BLD AUTO: 0 %
ERYTHROCYTE [DISTWIDTH] IN BLOOD BY AUTOMATED COUNT: 12.7 % (ref 10–15)
GFR SERPL CREATININE-BSD FRML MDRD: 22 ML/MIN/1.73M2
GLUCOSE SERPL-MCNC: 183 MG/DL (ref 70–99)
HCO3 BLDV-SCNC: 28 MMOL/L (ref 21–28)
HCT VFR BLD AUTO: 38.3 % (ref 35–47)
HGB BLD-MCNC: 12.6 G/DL (ref 11.7–15.7)
HOLD SPECIMEN: NORMAL
IMM GRANULOCYTES # BLD: 0.1 10E3/UL
IMM GRANULOCYTES NFR BLD: 0 %
KETONES BLD-SCNC: 0.5 MMOL/L (ref 0–0.6)
LACTATE SERPL-SCNC: 1.2 MMOL/L (ref 0.7–2)
LYMPHOCYTES # BLD AUTO: 1.5 10E3/UL (ref 0.8–5.3)
LYMPHOCYTES NFR BLD AUTO: 9 %
MAGNESIUM SERPL-MCNC: 2.3 MG/DL (ref 1.7–2.3)
MCH RBC QN AUTO: 27.3 PG (ref 26.5–33)
MCHC RBC AUTO-ENTMCNC: 32.9 G/DL (ref 31.5–36.5)
MCV RBC AUTO: 83 FL (ref 78–100)
MONOCYTES # BLD AUTO: 1.2 10E3/UL (ref 0–1.3)
MONOCYTES NFR BLD AUTO: 7 %
NEUTROPHILS # BLD AUTO: 13.4 10E3/UL (ref 1.6–8.3)
NEUTROPHILS NFR BLD AUTO: 84 %
NRBC # BLD AUTO: 0 10E3/UL
NRBC BLD AUTO-RTO: 0 /100
O2/TOTAL GAS SETTING VFR VENT: 99 %
PCO2 BLDV: 47 MM HG (ref 40–50)
PH BLDV: 7.39 [PH] (ref 7.32–7.43)
PLATELET # BLD AUTO: 444 10E3/UL (ref 150–450)
PO2 BLDV: 36 MM HG (ref 25–47)
POTASSIUM SERPL-SCNC: 4 MMOL/L (ref 3.4–5.3)
RBC # BLD AUTO: 4.61 10E6/UL (ref 3.8–5.2)
SARS-COV-2 RNA RESP QL NAA+PROBE: POSITIVE
SODIUM SERPL-SCNC: 138 MMOL/L (ref 136–145)
WBC # BLD AUTO: 16.2 10E3/UL (ref 4–11)

## 2022-07-30 PROCEDURE — 83735 ASSAY OF MAGNESIUM: CPT | Performed by: EMERGENCY MEDICINE

## 2022-07-30 PROCEDURE — 83605 ASSAY OF LACTIC ACID: CPT | Performed by: STUDENT IN AN ORGANIZED HEALTH CARE EDUCATION/TRAINING PROGRAM

## 2022-07-30 PROCEDURE — 99285 EMERGENCY DEPT VISIT HI MDM: CPT | Mod: 25

## 2022-07-30 PROCEDURE — 96361 HYDRATE IV INFUSION ADD-ON: CPT

## 2022-07-30 PROCEDURE — C9803 HOPD COVID-19 SPEC COLLECT: HCPCS

## 2022-07-30 PROCEDURE — 99207 PR NO BILLABLE SERVICE THIS VISIT: CPT | Performed by: STUDENT IN AN ORGANIZED HEALTH CARE EDUCATION/TRAINING PROGRAM

## 2022-07-30 PROCEDURE — 93005 ELECTROCARDIOGRAM TRACING: CPT

## 2022-07-30 PROCEDURE — 258N000003 HC RX IP 258 OP 636: Performed by: EMERGENCY MEDICINE

## 2022-07-30 PROCEDURE — 36415 COLL VENOUS BLD VENIPUNCTURE: CPT | Performed by: EMERGENCY MEDICINE

## 2022-07-30 PROCEDURE — U0003 INFECTIOUS AGENT DETECTION BY NUCLEIC ACID (DNA OR RNA); SEVERE ACUTE RESPIRATORY SYNDROME CORONAVIRUS 2 (SARS-COV-2) (CORONAVIRUS DISEASE [COVID-19]), AMPLIFIED PROBE TECHNIQUE, MAKING USE OF HIGH THROUGHPUT TECHNOLOGIES AS DESCRIBED BY CMS-2020-01-R: HCPCS | Performed by: EMERGENCY MEDICINE

## 2022-07-30 PROCEDURE — 258N000003 HC RX IP 258 OP 636: Performed by: STUDENT IN AN ORGANIZED HEALTH CARE EDUCATION/TRAINING PROGRAM

## 2022-07-30 PROCEDURE — 36415 COLL VENOUS BLD VENIPUNCTURE: CPT | Performed by: STUDENT IN AN ORGANIZED HEALTH CARE EDUCATION/TRAINING PROGRAM

## 2022-07-30 PROCEDURE — 86140 C-REACTIVE PROTEIN: CPT | Performed by: STUDENT IN AN ORGANIZED HEALTH CARE EDUCATION/TRAINING PROGRAM

## 2022-07-30 PROCEDURE — 99239 HOSP IP/OBS DSCHRG MGMT >30: CPT | Performed by: INTERNAL MEDICINE

## 2022-07-30 PROCEDURE — 96374 THER/PROPH/DIAG INJ IV PUSH: CPT

## 2022-07-30 PROCEDURE — 120N000001 HC R&B MED SURG/OB

## 2022-07-30 PROCEDURE — 250N000011 HC RX IP 250 OP 636: Performed by: EMERGENCY MEDICINE

## 2022-07-30 PROCEDURE — 82310 ASSAY OF CALCIUM: CPT | Performed by: EMERGENCY MEDICINE

## 2022-07-30 PROCEDURE — 82803 BLOOD GASES ANY COMBINATION: CPT | Performed by: STUDENT IN AN ORGANIZED HEALTH CARE EDUCATION/TRAINING PROGRAM

## 2022-07-30 PROCEDURE — 82010 KETONE BODYS QUAN: CPT | Performed by: STUDENT IN AN ORGANIZED HEALTH CARE EDUCATION/TRAINING PROGRAM

## 2022-07-30 PROCEDURE — G0378 HOSPITAL OBSERVATION PER HR: HCPCS

## 2022-07-30 PROCEDURE — 85025 COMPLETE CBC W/AUTO DIFF WBC: CPT | Performed by: EMERGENCY MEDICINE

## 2022-07-30 RX ORDER — BRIMONIDINE TARTRATE AND TIMOLOL MALEATE 2; 5 MG/ML; MG/ML
1 SOLUTION OPHTHALMIC 2 TIMES DAILY
COMMUNITY

## 2022-07-30 RX ORDER — SENNOSIDES 8.6 MG
1 TABLET ORAL DAILY PRN
COMMUNITY

## 2022-07-30 RX ORDER — ONDANSETRON 4 MG/1
4 TABLET, ORALLY DISINTEGRATING ORAL EVERY 6 HOURS PRN
Status: DISCONTINUED | OUTPATIENT
Start: 2022-07-30 | End: 2022-07-30 | Stop reason: HOSPADM

## 2022-07-30 RX ORDER — SODIUM CHLORIDE, SODIUM LACTATE, POTASSIUM CHLORIDE, CALCIUM CHLORIDE 600; 310; 30; 20 MG/100ML; MG/100ML; MG/100ML; MG/100ML
INJECTION, SOLUTION INTRAVENOUS CONTINUOUS
Status: DISCONTINUED | OUTPATIENT
Start: 2022-07-30 | End: 2022-07-30 | Stop reason: HOSPADM

## 2022-07-30 RX ORDER — PROCHLORPERAZINE MALEATE 10 MG
10 TABLET ORAL EVERY 6 HOURS PRN
Status: DISCONTINUED | OUTPATIENT
Start: 2022-07-30 | End: 2022-07-30 | Stop reason: HOSPADM

## 2022-07-30 RX ORDER — PROCHLORPERAZINE 25 MG
25 SUPPOSITORY, RECTAL RECTAL EVERY 12 HOURS PRN
Status: DISCONTINUED | OUTPATIENT
Start: 2022-07-30 | End: 2022-07-30 | Stop reason: HOSPADM

## 2022-07-30 RX ORDER — DROPERIDOL 2.5 MG/ML
1.25 INJECTION, SOLUTION INTRAMUSCULAR; INTRAVENOUS ONCE
Status: COMPLETED | OUTPATIENT
Start: 2022-07-30 | End: 2022-07-30

## 2022-07-30 RX ORDER — LATANOPROST 50 UG/ML
1 SOLUTION/ DROPS OPHTHALMIC AT BEDTIME
COMMUNITY

## 2022-07-30 RX ORDER — ONDANSETRON 2 MG/ML
4 INJECTION INTRAMUSCULAR; INTRAVENOUS EVERY 6 HOURS PRN
Status: DISCONTINUED | OUTPATIENT
Start: 2022-07-30 | End: 2022-07-30 | Stop reason: HOSPADM

## 2022-07-30 RX ORDER — VALACYCLOVIR HYDROCHLORIDE 500 MG/1
500 TABLET, FILM COATED ORAL 2 TIMES DAILY
COMMUNITY

## 2022-07-30 RX ADMIN — DROPERIDOL 1.25 MG: 2.5 INJECTION, SOLUTION INTRAMUSCULAR; INTRAVENOUS at 03:48

## 2022-07-30 RX ADMIN — SODIUM CHLORIDE 1000 ML: 9 INJECTION, SOLUTION INTRAVENOUS at 03:48

## 2022-07-30 RX ADMIN — SODIUM CHLORIDE, POTASSIUM CHLORIDE, SODIUM LACTATE AND CALCIUM CHLORIDE: 600; 310; 30; 20 INJECTION, SOLUTION INTRAVENOUS at 06:22

## 2022-07-30 RX ADMIN — SODIUM CHLORIDE, POTASSIUM CHLORIDE, SODIUM LACTATE AND CALCIUM CHLORIDE 1000 ML: 600; 310; 30; 20 INJECTION, SOLUTION INTRAVENOUS at 08:09

## 2022-07-30 ASSESSMENT — ENCOUNTER SYMPTOMS
NAUSEA: 1
VOMITING: 1
ABDOMINAL PAIN: 1

## 2022-07-30 ASSESSMENT — ACTIVITIES OF DAILY LIVING (ADL)
ADLS_ACUITY_SCORE: 35

## 2022-07-30 NOTE — ED TRIAGE NOTES
Pt states vomiting for past 24 hours, hx of cyclical vomiting syndrome. ABCs intact GCS 15     Triage Assessment     Row Name 07/30/22 0203       Triage Assessment (Adult)    Airway WDL WDL       Respiratory WDL    Respiratory WDL WDL       Skin Circulation/Temperature WDL    Skin Circulation/Temperature WDL WDL       Cardiac WDL    Cardiac WDL WDL       Peripheral/Neurovascular WDL    Peripheral Neurovascular WDL WDL       Cognitive/Neuro/Behavioral WDL    Cognitive/Neuro/Behavioral WDL WDL

## 2022-07-30 NOTE — ED PROVIDER NOTES
History   Chief Complaint:  Vomiting       The history is provided by a significant other. The history is limited by the condition of the patient.      Kym Lemus is a 32 year old female with history of diabetes type II who presents with abdominal pain, nausea, and vomiting for the past 4/5 days ago. Her  reports that she has been vomiting profusely, filling 3 small trash bags over the last 3 days. He also reports that she has been afraid to eat or drink recently because she is afraid that it will make her vomit again. He adds that she has only gotten 1-2 hours of uninterrupted sleep over the past 3 days. She has had such episodes of cyclic vomiting in the past. These are sometimes triggered by stress, but she does not think that this is the cause this time.     Review of Systems   Unable to perform ROS: Acuity of condition   Gastrointestinal: Positive for abdominal pain, nausea and vomiting.     Allergies:  Metformin     Medications:  Norvasc  Pamelor   Zofran   Compazine   Effexor   Valtrex   Venlafaxine   Imitrex   Pamelor   Metamucil   Protonix   Zyprexa   Xalatan   Shawner     Past Medical History:     Abdominal pain   Hyponatremia   DEREK   Non-intractable vomiting with nausea   Abdominal pain, LUQ  Marijuana use   Hypertension   Gastroparesis   Elevated lactic acid  Adjustment disorder with mixed disturbance of emotions and conduct  Diabetes type II  Gastritis without bleeding   Tachycardia   Dyspepsia   Cyclic vomiting syndrome   Glaucoma suspect of right eye   Marijuana use   Obesity     Past Surgical History:    The patient's  denies any surgical history.      Family History:    Mother: hypertension, diabetes type II, cataracts, glaucoma     Social History:  Patient came from home.  Patient is accompanied in the ED.    Physical Exam     Patient Vitals for the past 24 hrs:   BP Temp Temp src Pulse Resp SpO2   07/30/22 0445 (!) 145/97 -- -- (!) 129 13 100 %   07/30/22 0430 (!)  160/116 -- -- (!) 121 9 97 %   07/30/22 0415 (!) 162/113 -- -- (!) 123 9 96 %   07/30/22 0400 -- -- -- 115 13 100 %   07/30/22 0202 (!) 153/107 98.4  F (36.9  C) Oral 119 20 99 %       Physical Exam   Constitutional: Vital signs reviewed as above.   HENT:               Head: No external signs of trauma noted.              Eyes: Conjunctivae are normal. Pupils are equal, round, and reactive to light.   Cardiovascular:               Tachycardic rate, regular rhythm and normal heart sounds.                Exam reveals no friction rub.                No murmur heard.  Pulmonary/Chest:               Effort normal and breath sounds normal.               No respiratory distress.               There are no wheezes.               There are no rales.   Gastrointestinal:               Soft.               Bowel sounds normal.               There is no distension.               There is no tenderness on my exam.               There is no rebound or guarding.   Musculoskeletal:               Normal range of motion.               Normal Tone  Neurological: Patient is alert and oriented to person, place, and time.   Skin: Skin is warm and dry. Patient is not diaphoretic  Psychiatric: The patient appears anxious and uncomfortable      Emergency Department Course   ECG  ECG:  ECG taken at 0353, ECG read at 0357  Sinus tachycardia   Right atrial enlargement   Borderline ECG  Rate 130 bpm. WI interval 134 ms. QRS duration 74 ms. QT/QTc 308/453 ms. P-R-T axes 84 88 75.  Compared to EKG dated 10/28/2021:  Tachycardia  Nonspecific T wave abnormality has since resolved     Imaging:  No orders to display     Report per radiology    Laboratory:  Labs Ordered and Resulted from Time of ED Arrival to Time of ED Departure   BASIC METABOLIC PANEL - Abnormal       Result Value    Creatinine 2.80 (*)     Sodium 138      Potassium 4.0      Urea Nitrogen 43.7 (*)     Chloride 95 (*)     Carbon Dioxide (CO2) 22      Anion Gap 21 (*)     Glucose 183  "(*)     GFR Estimate 22 (*)     Calcium 10.3 (*)    CBC WITH PLATELETS AND DIFFERENTIAL - Abnormal    WBC Count 16.2 (*)     RBC Count 4.61      Hemoglobin 12.6      Hematocrit 38.3      MCV 83      MCH 27.3      MCHC 32.9      RDW 12.7      Platelet Count 444      % Neutrophils 84      % Lymphocytes 9      % Monocytes 7      % Eosinophils 0      % Basophils 0      % Immature Granulocytes 0      NRBCs per 100 WBC 0      Absolute Neutrophils 13.4 (*)     Absolute Lymphocytes 1.5      Absolute Monocytes 1.2      Absolute Eosinophils 0.0      Absolute Basophils 0.0      Absolute Immature Granulocytes 0.1      Absolute NRBCs 0.0     MAGNESIUM - Normal    Magnesium 2.3     COVID-19 VIRUS (CORONAVIRUS) BY PCR        Emergency Department Course:         Reviewed:  I reviewed nursing notes, vitals, past medical history and Care Everywhere    Assessments/consults:  ED Course as of 07/30/22 0505   Sat Jul 30, 2022   0321 Dr. Howard' evaluation   0432 Rechecked and updated. Patient notes she feels better. She is now able to lay still in bed and is not pacing or rocking back and forth.   0447 Updated regarding labs and plan for admission.   0501 D/W Dr. Yip. Would start the patient as obs.     Interventions:  Medications   droperidol (INAPSINE) injection 1.25 mg (1.25 mg Intravenous Given 7/30/22 0348)   0.9% sodium chloride BOLUS (1,000 mLs Intravenous New Bag 7/30/22 0348)     Disposition:  The patient was placed in observation under the care of Dr. Yip.     Impression & Plan     CMS Diagnoses: None      Medical Decision Making:  This 32-year-old female patient presents to the ED due to abdominal pain and vomiting.  Please see the HPI and exam for specifics.  The patient does note a history of \"cyclic vomiting\".  She states that stress sometimes causes her symptoms to flareup but she does not feel that was the case recently.  The visitor with her notes that she is filled up \"3 trash bags\" of vomitus over the last few days.  " He indicates these are smaller trash bags but both he and the patient were concerned about her ongoing symptoms.     A broad differential was considered including cyclic vomiting, electrolyte dysfunction, viral sources, and even bowel obstruction.  The above work-up was undertaken.     Laboratory studies are notable for  leukocytosis but more importantly acute kidney injury elevated anion gap, & mild hyperglycemia.     Fortunately, the patient improved after the administration of droperidol.  IV fluid administration was started.  I discussed case with the hospitalist and after discussion, we will start the patient as an observation patient though if her symptoms persist she may need to be changed to an admission.       Critical Care Time: was 0 minutes for this patient excluding procedures    Diagnosis:    ICD-10-CM    1. Acute kidney injury (H)  N17.9    2. Non-intractable vomiting with nausea, unspecified vomiting type  R11.2    3. Dehydration  E86.0        Discharge Medications:  New Prescriptions    No medications on file       Scribe Disclosure:  Iqra OGDEN Ismail, am serving as a scribe at 3:16 AM on 7/30/2022 to document services personally performed by Zain Howard DO based on my observations and the provider's statements to me.          Zain Howard DO  07/30/22 2449

## 2022-07-30 NOTE — PHARMACY-ADMISSION MEDICATION HISTORY
Admission medication history interview status for this patient is complete. See [facility] admission navigator for allergy information, prior to admission medications and immunization status.     Medication history interview done, indicate source(s): Patient  Medication history resources (including written lists, pill bottles, clinic record):SureScripts, care [facility]  [facility]    Changes made to PTA medication list:  Added: latanoprost, Combigan, senna  Changed: nortriptyline to 50 mg  Reported as Not Taking: prochlorperazine supps  Removed: amlodipine, Zofran    Actions taken by pharmacist (provider contacted, etc):None     Additional medication history information:None    Medication reconciliation/reorder completed by provider prior to medication history?  N   (Y/N)     ?      Prior to Admission medications    Medication Sig Last Dose Taking? Auth Provider Long Term End Date   brimonidine-timolol (COMBIGAN) 0.2-0.5 % ophthalmic solution Place 1 drop into both eyes 2 times daily 7/29/2022 at am Yes Unknown, Entered By History     latanoprost (XALATAN) 0.005 % ophthalmic solution Place 1 drop into both eyes At Bedtime 7/28/2022 Yes Unknown, Entered By History No    nortriptyline (PAMELOR) 10 MG capsule Take 50 mg by mouth At Bedtime 7/29/2022 at pm Yes Reported, Patient Yes    sennosides (SENOKOT) 8.6 MG tablet Take 1 tablet by mouth daily as needed for constipation Past Week at Unknown time Yes Unknown, Entered By History     valACYclovir (VALTREX) 500 MG tablet Take 500 mg by mouth 2 times daily For 3 days for cold sores More than a month at Unknown time Yes Unknown, Entered By History Yes    prochlorperazine (COMPAZINE) 25 MG suppository Place 1 suppository (25 mg) rectally every 12 hours as needed for nausea  Patient not taking: Reported on 7/30/2022 Not Taking at Unknown time  Maldonado Conner MD     venlafaxine (EFFEXOR-ER) 37.5 MG 24 hr tablet Take 1 tablet (37.5 mg) by mouth daily (with  breakfast) 7/27/2022 at am  Jerald Giordano APRN CNP Yes

## 2022-07-30 NOTE — ED NOTES
Pt had a brief episode of HR up to 140s.  notified. Pt stated she was feeling very anxious. EKG done showing Sinus tach. Pt expressed anxiety about being admitted to the hospital and having covid. Calming techniques used. Deep breathing done with patient. HR improved. 100s-1 teens. New IV US placed and fluids restarted. Pt resting in bed. Call light within reach.

## 2022-07-30 NOTE — ED NOTES
"At conclusion of triage after I informed pt and her visitor of her stable appearance and need to wait for a treatment room. After I informed the pt and the visitor about the need to wait to be seen the visitor engaged me in an extensive conversation stating things such as \"Do you even care about her bro or is this all transactional?\" And \"How can you say she is not sick when she has lost 40 percent of her body weight throwing up today?\" I had extensive conversation with visitor about the varied nature of medical emergencies and how vital signs and multiple other factors are utilized along with clinical knowledge and expertise to determine if a patient needs to be prioritized ahead of other patient's who arrived before them. The visitor then stated \"So when she dies out here will you finally care and do something about it?\" I again explained to the visitor that the patient's vital signs and other factors did not indicate she was in imminent danger of loss of life, limb or vision at this time. At multiple times during the conversation the visitor talked over this writer while the writer was speaking and rejected attempts at educating visitor on the wide ranging presentations of possible medical emergencies.  "

## 2022-07-30 NOTE — H&P
[facility]    History and Physical - Hospitalist Service       Date of Admission:  7/30/2022    Assessment & Plan      Kym Lemus is a 32 year old female with past medical history significant for cyclic vomiting syndrome, chronic kidney injury, type 2 diabetes mellitus, and hypertension who was admitted on 7/30/2022 with intractable nausea/vomiting and was found to have acute kidney injury.    Intractable Nausea & Vomiting  Hx of Cyclic Vomiting Syndrome  2 day history of intractable vomiting and poor oral intake. Does not know what triggers her cyclic vomiting. Provided with droperidol in emergency department, which was helpful. Will provide zofran + compazine PRN.   -mIVF @ 100/hr + bolus PRN  -Zofran + Compazine PRN  -ADAT    Acute on Chronic Kidney Injury  Creatinine 2.8, increased from baseline 1.7-1.9. Likely due to hypovolemia in setting of intractable vomiting for 2 days. Giving IVF as above. Recheck BMP in morning.  -mIVF  -BMP in morning  -Avoid nephrotoxins    Anion Gap Metabolic Acidosis  Anion gap 21. Bicarb 22. Checking lactic acid, ketones, VBG. Providing fluids as above.    COVID-19 Infection  Presented with nausea and vomiting and found to be COVID-19 positive. Suspect this as potential trigger for symptoms. No hypoxia or shortness of breath. Does not qualify for dexamethasone or remdesivir. Checking CRP.    Sinus Tachycardia  Rates to 140s. Suspect due to illness and dehydration. Fluids as above. Monitor.    Leukocytosis: WBC 16.2. Possibly related to stress demargination. Recheck in morning.  Hypercalcemia: Calcium 10.3. Suspect 2/2 dehydration. IVF as above. Recheck in morning.  Type 2 Diabetes Mellitus: MDSSI.  Hypertension: Pending med rec.     Diet:   ADAT  DVT Prophylaxis: Low Risk/Ambulatory with no VTE prophylaxis indicated  Gallagher Catheter: Not present  Central Lines: None  Cardiac Monitoring: None  Code Status:   Full     The patient's care was discussed with  the Bedside Nurse and Patient.    Shyam Yip MD  Hospitalist Service  [facility]  ______________________________________________________________________    Chief Complaint   Vomiting    History is obtained from the patient    History of Present Illness   Kym Lemus is a 32 year old female with past medical history significant for cyclic vomiting syndrome, chronic kidney injury, type 2 diabetes mellitus, and hypertension who presented to [facility] on 7/30/2022 with 2 days of intractable nausea and vomiting.    Patient stated that she began having nausea and intractable vomiting on Thursday, July 28.  Since that time she has not been able to keep any food and very little drink down.  She does have a history of cyclic vomiting syndrome, though she does not know what triggers this.  Her most recent flare was in early July, but prior to that she had not had an episode since November 2021.  She stated that she is feeling quite tired, and she has had some heart racing over the course of the last 2 days as well.  She denies any fevers, chills.  She denies any diarrhea, or urinary symptoms.    In the emergency department, she was afebrile and sating well on room air. She was tachycardic to 122 and hypertensive to 161/109. Laboratory studies were obtained showing Sodium 138, potassium 4.0, chloride 95, carbon dioxide 22, BUN 43.7, creatinine 2.8.  Her calcium was 10.3.  Her anion gap was 21.  CBC revealed a white blood cell count of 16.2, hemoglobin 12.6, platelet count of 444.  Her COVID test incidentally returned positive.  She continued to have nausea and vomiting, which improved after 1.25mg of droperidol. She received 1L of IV fluids. She is being admitted for further treatment of intractable vomiting and acute kidney injury.    Review of Systems    A full 10+ point review of systems was obtained and found to be negative with the exception of those items noted in the  HPI.    Past Medical History    I have reviewed this patient's medical history and updated it with pertinent information if needed.   Past Medical History:   Diagnosis Date     Benign essential hypertension      Cannabinoid hyperemesis syndrome      Cervical intraepithelial glandular neoplasia     s/p LEEP     Diabetes mellitus type 2, diet-controlled (H)      Gastritis      Gastroparesis     and canabinoid hyperemesis cb DEREK       Past Surgical History   I have reviewed this patient's surgical history and updated it with pertinent information if needed.  Past Surgical History:   Procedure Laterality Date     LEEP TX, CERVICAL      EYAL !!       Social History   I have reviewed this patient's social history and updated it with pertinent information if needed.  Social History     Tobacco Use     Smoking status: Never Smoker     Smokeless tobacco: Never Used   Substance Use Topics     Alcohol use: Not Currently     Drug use: Yes     Types: Marijuana       Family History   I have reviewed this patient's family history and updated it with pertinent information if needed.  Family History   Problem Relation Age of Onset     Hypertension Mother      Diabetes Type 2  Mother      Cataracts Mother        Prior to Admission Medications   Prior to Admission Medications   Prescriptions Last Dose Informant Patient Reported? Taking?   amLODIPine (NORVASC) 10 MG tablet   No No   Sig: Take 1 tablet (10 mg) by mouth daily   nortriptyline (PAMELOR) 10 MG capsule  Self Yes No   Sig: Take 30 mg by mouth At Bedtime    ondansetron (ZOFRAN ODT) 4 MG ODT tab   No No   Sig: Take 1 tablet (4 mg) by mouth every 6 hours as needed for nausea or vomiting   prochlorperazine (COMPAZINE) 25 MG suppository   No No   Sig: Place 1 suppository (25 mg) rectally every 12 hours as needed for nausea   venlafaxine (EFFEXOR-ER) 37.5 MG 24 hr tablet   No No   Sig: Take 1 tablet (37.5 mg) by mouth daily (with breakfast)      Facility-Administered Medications:  "None     Allergies   Allergies   Allergen Reactions     Metformin Diarrhea       Physical Exam   Vital Signs: Temp: 98.4  F (36.9  C) Temp src: Oral BP: (!) 145/97 Pulse: (!) 129   Resp: 13 SpO2: 100 %      Weight: 0 lbs 0 oz    Temp: 98.4  F (36.9  C) Temp src: Oral BP: (!) 161/109 Pulse: (!) 122   Resp: 20 SpO2: 100 %          Estimated body mass index is 25.98 kg/m  as calculated from the following:    Height as of 10/7/21: 1.651 m (5' 5\").    Weight as of 10/8/21: 70.8 kg (156 lb 1.6 oz).    General: Very pleasant female resting comfortably in hospital bed.  Awake, alert, interactive. Appears to feel unwell.  HEENT: Normocephalic, atraumatic.  PERRL, EOMI.  Conjunctiva clear, sclerae anicteric.  Mucous membranes moist.  Cardiac: Tachycardic. Regular rhythm without murmur, gallop, or rub.  No peripheral edema.  Respiratory: Normal work of breathing.  Clear to auscultation bilaterally without wheezing, rales, or rhonchi.  GI: Normal, active bowel sounds.  Abdomen soft, nontender, nondistended.  : Deferred.  Musculoskeletal: Moving all extremities appropriately.  Skin: No rashes or abrasions on exposed skin.  Neurologic: Alert and oriented x4.  Cranial nerves II through XII grossly intact.  Psychologic: Appropriate mood and affect.    Data   Data reviewed today: I reviewed all medications, new labs and imaging results over the last 24 hours.   I personally reviewed no images or EKG's today.    Recent Labs   Lab 07/30/22  0438 07/30/22  0347   WBC 16.2*  --    HGB 12.6  --    MCV 83  --      --    NA  --  138   POTASSIUM  --  4.0   CHLORIDE  --  95*   CO2  --  22   BUN  --  43.7*   CR  --  2.80*   ANIONGAP  --  21*   DIMITRI  --  10.3*   GLC  --  183*     No results found for this or any previous visit (from the past 24 hour(s)).  "

## 2022-07-30 NOTE — DISCHARGE INSTRUCTIONS
Discharge diet: Regular     Discharge activity: Activity as tolerated     Discharge follow-up:     Follow up with primary care provider within one week or earlier if symptoms return or gets worse.     Follow up with consultant as instructed.

## 2022-07-30 NOTE — DISCHARGE SUMMARY
[facility]  Discharge Summary    Admit date:  7/30/2022    Discharge date and time: 07/30/22 1234    Discharge Physician: Vishnu Garcia MD    Primary care provider: [facility], [facility]    Primary Discharge Diagnosis      Intractable Nausea and Vomiting    Secondary Diagnoses     Cyclic Nausea and Vomiting Syndrome  COVID-19 Infection  Acute Renal Failure  CKD Stage III  Diabetes Mellitus  Leukocytosis  EBONY    Summary of Hospital Stay     32F with hx of EBONY, CKD Stage III, diabetes mellitus, and cyclic nausea and vomiting syndrome presented with intractable nausea and vomiting and found to be positive for COVID-19 and have an acute kidney injury. Patient was started on IV fluids and anti-emetics but elected to leave shortly after admission as she was feeling improved. Needs repeat BMP at PCP follow-up visit.    Patient Discharge Condition & Exam     Discharge condition: Improved    BP (!) 158/111   Pulse 106   Temp 98.4  F (36.9  C) (Oral)   Resp 8   LMP 07/26/2022   SpO2 95%      General: In NAD.  Cardiac: RRR.  Lungs: CTAB.  Abd: Non-tender.  Ext: No edema.    Discharge Instructions     Patient/family instructions: Written discharge instruction given to patient/family    Discharge Medications:     Review of your medicines      CONTINUE these medicines which have NOT CHANGED      Dose / Directions   brimonidine-timolol 0.2-0.5 % ophthalmic solution  Commonly known as: COMBIGAN      Dose: 1 drop  Place 1 drop into both eyes 2 times daily  Refills: 0     latanoprost 0.005 % ophthalmic solution  Commonly known as: XALATAN      Dose: 1 drop  Place 1 drop into both eyes At Bedtime  Refills: 0     nortriptyline 10 MG capsule  Commonly known as: PAMELOR      Dose: 50 mg  Take 50 mg by mouth At Bedtime  Refills: 0     prochlorperazine 25 MG suppository  Commonly known as: COMPAZINE      Dose: 25 mg  Place 1 suppository (25 mg) rectally every 12 hours as needed for nausea  Quantity: 10  suppository  Refills: 0     sennosides 8.6 MG tablet  Commonly known as: SENOKOT      Dose: 1 tablet  Take 1 tablet by mouth daily as needed for constipation  Refills: 0     valACYclovir 500 MG tablet  Commonly known as: VALTREX      Dose: 500 mg  Take 500 mg by mouth 2 times daily For 3 days for cold sores  Refills: 0     venlafaxine 37.5 MG 24 hr tablet  Commonly known as: EFFEXOR-ER  Used for: Adjustment disorder with mixed disturbance of emotions and conduct      Dose: 37.5 mg  Take 1 tablet (37.5 mg) by mouth daily (with breakfast)  Quantity: 30 tablet  Refills: 0           Discharge diet: Regular    Discharge activity: Activity as tolerated    Discharge follow-up:    Follow up with primary care provider within one week or earlier if symptoms return or gets worse.    Follow up with consultant as instructed.    Pending Results     Unresulted Labs Ordered in the Past 30 Days of this Admission     No orders found from 6/30/2022 to 7/31/2022.           Patient Allergies     Allergies   Allergen Reactions     Metformin Diarrhea     Disposition   Disposition: Home     I saw and evaluated the patient on day of discharge and  discharge instructions reviewed  and  all the patient's questions and concerns addressed. Over 30 minutes spent on discharge and coordination of discharge process for this patient.

## 2022-07-30 NOTE — ED NOTES
"While I was triaging another patient I could hear the patient's visitor approach the registration desk and state to registration \"People that came in after her and aren't as sick as her keep going back before her.\" Registration informed the visitor multiple times that no one else had been roomed during the time the patient was waiting in the lobby for a room. The visitor then stated to registration \"She is dieing, you are just gonna sit there and let her die, what is the protocol for that.\" Visitor began raising his voice with registration staff at multiple times during the conversation.   "

## 2022-07-30 NOTE — ED NOTES
[facility]  ED Nurse Handoff Report    Kym Lemus is a 32 year old female   ED Chief complaint: Vomiting  . ED Diagnosis:   Final diagnoses:   Acute kidney injury (H)   Non-intractable vomiting with nausea, unspecified vomiting type   Dehydration     Allergies:   Allergies   Allergen Reactions     Metformin Diarrhea       Code Status:   Activity level - Baseline/Home:  Independent. Activity Level - Current:   Stand by Assist. Lift room needed: No. Bariatric: No   Needed: No   Isolation: No. Infection: Not Applicable.     Vital Signs:   Vitals:    07/30/22 0400 07/30/22 0415 07/30/22 0430 07/30/22 0445   BP:  (!) 162/113 (!) 160/116 (!) 145/97   Pulse: 115 (!) 123 (!) 121 (!) 129   Resp: 13 9 9 13   Temp:       TempSrc:       SpO2: 100% 96% 97% 100%       Cardiac Rhythm:  ,      Pain level:    Patient confused: No. Patient Falls Risk: Yes.   Elimination Status: Has voided   Patient Report - Initial Complaint: n/v. Focused Assessment: Pt states vomiting for past 24 hours, hx of cyclical vomiting syndrome.  Tests Performed: labs. Abnormal Results: elevated cr.   Treatments provided: ivf, meds  Family Comments: family left at present  OBS brochure/video discussed/provided to patient:  Yes  ED Medications:   Medications   droperidol (INAPSINE) injection 1.25 mg (1.25 mg Intravenous Given 7/30/22 0348)   0.9% sodium chloride BOLUS (1,000 mLs Intravenous New Bag 7/30/22 0348)     Drips infusing:  No  For the majority of the shift, the patient's behavior Green. Interventions performed were .    Sepsis treatment initiated: No     Patient tested for COVID 19 prior to admission: YES    ED Nurse Name/Phone Number: LIBORIO Banegas,   5:06 AM

## 2022-08-01 LAB
ATRIAL RATE - MUSE: 104 BPM
ATRIAL RATE - MUSE: 130 BPM
DIASTOLIC BLOOD PRESSURE - MUSE: NORMAL MMHG
DIASTOLIC BLOOD PRESSURE - MUSE: NORMAL MMHG
INTERPRETATION ECG - MUSE: NORMAL
INTERPRETATION ECG - MUSE: NORMAL
P AXIS - MUSE: 70 DEGREES
P AXIS - MUSE: 84 DEGREES
PR INTERVAL - MUSE: 134 MS
PR INTERVAL - MUSE: 142 MS
QRS DURATION - MUSE: 72 MS
QRS DURATION - MUSE: 74 MS
QT - MUSE: 308 MS
QT - MUSE: 344 MS
QTC - MUSE: 452 MS
QTC - MUSE: 453 MS
R AXIS - MUSE: 88 DEGREES
R AXIS - MUSE: 94 DEGREES
SYSTOLIC BLOOD PRESSURE - MUSE: NORMAL MMHG
SYSTOLIC BLOOD PRESSURE - MUSE: NORMAL MMHG
T AXIS - MUSE: 63 DEGREES
T AXIS - MUSE: 75 DEGREES
VENTRICULAR RATE- MUSE: 104 BPM
VENTRICULAR RATE- MUSE: 130 BPM